# Patient Record
Sex: FEMALE | Race: WHITE | NOT HISPANIC OR LATINO | Employment: FULL TIME | URBAN - NONMETROPOLITAN AREA
[De-identification: names, ages, dates, MRNs, and addresses within clinical notes are randomized per-mention and may not be internally consistent; named-entity substitution may affect disease eponyms.]

---

## 2017-02-03 RX ORDER — NORGESTIMATE AND ETHINYL ESTRADIOL 0.25-0.035
KIT ORAL
Qty: 28 TABLET | Refills: 0 | Status: SHIPPED | OUTPATIENT
Start: 2017-02-03 | End: 2017-02-06 | Stop reason: SDUPTHER

## 2017-02-06 ENCOUNTER — PROCEDURE VISIT (OUTPATIENT)
Dept: OBSTETRICS AND GYNECOLOGY | Facility: CLINIC | Age: 27
End: 2017-02-06

## 2017-02-06 VITALS — BODY MASS INDEX: 21.62 KG/M2 | HEIGHT: 70 IN | WEIGHT: 151 LBS

## 2017-02-06 DIAGNOSIS — Z30.41 USES ORAL CONTRACEPTION: ICD-10-CM

## 2017-02-06 DIAGNOSIS — Z01.419 ENCOUNTER FOR ANNUAL ROUTINE GYNECOLOGICAL EXAMINATION: Primary | ICD-10-CM

## 2017-02-06 PROCEDURE — 99385 PREV VISIT NEW AGE 18-39: CPT | Performed by: ADVANCED PRACTICE MIDWIFE

## 2017-02-06 PROCEDURE — 88142 CYTOPATH C/V THIN LAYER: CPT | Performed by: ADVANCED PRACTICE MIDWIFE

## 2017-02-06 RX ORDER — NORGESTIMATE AND ETHINYL ESTRADIOL 0.25-0.035
1 KIT ORAL DAILY
Qty: 28 TABLET | Refills: 12 | Status: SHIPPED | OUTPATIENT
Start: 2017-02-06 | End: 2018-01-24 | Stop reason: HOSPADM

## 2017-02-06 NOTE — PROGRESS NOTES
Subjective   Angélica Hooper is a 26 y.o. female. Who presents for annual exam, refill on her OCPs, and preconceptional counseling    Gynecologic Exam   She is not pregnant. She uses oral contraceptives for contraception. Her menstrual history has been regular.       The following portions of the patient's history were reviewed and updated as appropriate: allergies, current medications, past family history, past medical history, past social history, past surgical history and problem list.    Review of Systems   Constitutional: Negative.    HENT: Negative.    Eyes: Negative.    Respiratory: Negative.    Cardiovascular: Negative.    Gastrointestinal: Negative.    Endocrine: Negative.    Genitourinary: Negative.    Musculoskeletal: Negative.    Skin: Negative.    Allergic/Immunologic: Negative.    Neurological: Negative.    Hematological: Negative.    Psychiatric/Behavioral: Negative.        Objective   Physical Exam   Constitutional: She is oriented to person, place, and time. She appears well-developed and well-nourished.   HENT:   Head: Normocephalic and atraumatic.   Right Ear: External ear normal.   Left Ear: External ear normal.   Nose: Nose normal.   Mouth/Throat: Oropharynx is clear and moist.   Eyes: Conjunctivae and EOM are normal. Pupils are equal, round, and reactive to light.   Neck: Normal range of motion. Neck supple. No thyromegaly present.   Cardiovascular: Normal rate, regular rhythm and normal heart sounds.    Pulmonary/Chest: Effort normal and breath sounds normal.   Abdominal: Soft. Bowel sounds are normal.   Genitourinary: Vagina normal and uterus normal.   Musculoskeletal: Normal range of motion.   Neurological: She is alert and oriented to person, place, and time. She has normal reflexes.   Skin: Skin is warm and dry.   Psychiatric: She has a normal mood and affect. Her behavior is normal. Thought content normal.       Assessment/Plan   Angélica was seen today for gynecologic exam. Reviewed  walking 3 times a week.  Reviewed the use of a multivitamin or prenatal vitamins daily.  Patient is planning to conceive discussed stopping oral contraception in June.  Reviewed having intercourse every other day during ovulation period.  Discussed nutrition and balanced diet.  Discussed aches ACHES signs and symptoms to report while taking oral contraception.  Patient verbalized understanding.     Diagnoses and all orders for this visit:    Encounter for annual routine gynecological examination  -     Liquid-based Pap Smear, Screening    Other orders  -     norgestimate-ethinyl estradiol (MONONESSA) 0.25-35 MG-MCG per tablet; Take 1 tablet by mouth Daily.

## 2017-02-08 LAB
LAB AP CASE REPORT: NORMAL
LAB AP GYN ADDITIONAL INFORMATION: NORMAL
LAB AP GYN OTHER FINDINGS: NORMAL
Lab: NORMAL
PATH INTERP SPEC-IMP: NORMAL
STAT OF ADQ CVX/VAG CYTO-IMP: NORMAL

## 2017-03-03 RX ORDER — NORGESTIMATE AND ETHINYL ESTRADIOL 0.25-0.035
KIT ORAL
Qty: 28 TABLET | Refills: 0 | OUTPATIENT
Start: 2017-03-03

## 2017-06-29 ENCOUNTER — HOSPITAL ENCOUNTER (EMERGENCY)
Facility: HOSPITAL | Age: 27
Discharge: HOME | End: 2017-06-29
Attending: FAMILY MEDICINE | Admitting: NURSE PRACTITIONER

## 2017-06-29 ENCOUNTER — APPOINTMENT (OUTPATIENT)
Dept: ULTRASOUND IMAGING | Facility: HOSPITAL | Age: 27
End: 2017-06-29

## 2017-06-29 VITALS
DIASTOLIC BLOOD PRESSURE: 91 MMHG | HEART RATE: 85 BPM | SYSTOLIC BLOOD PRESSURE: 130 MMHG | WEIGHT: 160 LBS | TEMPERATURE: 98 F | RESPIRATION RATE: 18 BRPM | BODY MASS INDEX: 23.7 KG/M2 | HEIGHT: 69 IN | OXYGEN SATURATION: 98 %

## 2017-06-29 DIAGNOSIS — N93.9 VAGINAL BLEEDING: Primary | ICD-10-CM

## 2017-06-29 LAB
ABO GROUP BLD: NORMAL
ALBUMIN SERPL-MCNC: 4.3 G/DL (ref 3.4–4.8)
ALBUMIN/GLOB SERPL: 1.1 G/DL (ref 1.1–1.8)
ALP SERPL-CCNC: 88 U/L (ref 38–126)
ALT SERPL W P-5'-P-CCNC: 29 U/L (ref 9–52)
ANION GAP SERPL CALCULATED.3IONS-SCNC: 14 MMOL/L (ref 5–15)
AST SERPL-CCNC: 20 U/L (ref 14–36)
BACTERIA UR QL AUTO: ABNORMAL /HPF
BASOPHILS # BLD AUTO: 0.01 10*3/MM3 (ref 0–0.2)
BASOPHILS NFR BLD AUTO: 0.1 % (ref 0–2)
BILIRUB SERPL-MCNC: 0.6 MG/DL (ref 0.2–1.3)
BILIRUB UR QL STRIP: NEGATIVE
BLD GP AB SCN SERPL QL: NEGATIVE
BUN BLD-MCNC: 12 MG/DL (ref 7–21)
BUN/CREAT SERPL: 16.2 (ref 7–25)
CALCIUM SPEC-SCNC: 9.2 MG/DL (ref 8.4–10.2)
CANDIDA ALBICANS: NEGATIVE
CHLORIDE SERPL-SCNC: 101 MMOL/L (ref 95–110)
CLARITY UR: ABNORMAL
CO2 SERPL-SCNC: 24 MMOL/L (ref 22–31)
COLOR UR: ABNORMAL
CREAT BLD-MCNC: 0.74 MG/DL (ref 0.5–1)
DEPRECATED RDW RBC AUTO: 41 FL (ref 36.4–46.3)
EOSINOPHIL # BLD AUTO: 0.03 10*3/MM3 (ref 0–0.7)
EOSINOPHIL NFR BLD AUTO: 0.4 % (ref 0–7)
ERYTHROCYTE [DISTWIDTH] IN BLOOD BY AUTOMATED COUNT: 13 % (ref 11.5–14.5)
GARDNERELLA VAGINALIS: NEGATIVE
GFR SERPL CREATININE-BSD FRML MDRD: 95 ML/MIN/1.73 (ref 71–165)
GLOBULIN UR ELPH-MCNC: 3.9 GM/DL (ref 2.3–3.5)
GLUCOSE BLD-MCNC: 90 MG/DL (ref 60–100)
GLUCOSE UR STRIP-MCNC: NEGATIVE MG/DL
HCG INTACT+B SERPL-ACNC: 162.28 MIU/ML
HCT VFR BLD AUTO: 37.3 % (ref 35–45)
HGB BLD-MCNC: 13 G/DL (ref 12–15.5)
HGB UR QL STRIP.AUTO: ABNORMAL
HYALINE CASTS UR QL AUTO: ABNORMAL /LPF
IMM GRANULOCYTES # BLD: 0.02 10*3/MM3 (ref 0–0.02)
IMM GRANULOCYTES NFR BLD: 0.3 % (ref 0–0.5)
KETONES UR QL STRIP: NEGATIVE
LEUKOCYTE ESTERASE UR QL STRIP.AUTO: ABNORMAL
LYMPHOCYTES # BLD AUTO: 2.17 10*3/MM3 (ref 0.6–4.2)
LYMPHOCYTES NFR BLD AUTO: 31.8 % (ref 10–50)
Lab: NORMAL
MCH RBC QN AUTO: 29.9 PG (ref 26.5–34)
MCHC RBC AUTO-ENTMCNC: 34.9 G/DL (ref 31.4–36)
MCV RBC AUTO: 85.7 FL (ref 80–98)
MONOCYTES # BLD AUTO: 0.73 10*3/MM3 (ref 0–0.9)
MONOCYTES NFR BLD AUTO: 10.7 % (ref 0–12)
NEUTROPHILS # BLD AUTO: 3.86 10*3/MM3 (ref 2–8.6)
NEUTROPHILS NFR BLD AUTO: 56.7 % (ref 37–80)
NITRITE UR QL STRIP: NEGATIVE
PH UR STRIP.AUTO: 7 [PH] (ref 5–9)
PLATELET # BLD AUTO: 248 10*3/MM3 (ref 150–450)
PMV BLD AUTO: 9.7 FL (ref 8–12)
POTASSIUM BLD-SCNC: 3.6 MMOL/L (ref 3.5–5.1)
PROT SERPL-MCNC: 8.2 G/DL (ref 6.3–8.6)
PROT UR QL STRIP: ABNORMAL
RBC # BLD AUTO: 4.35 10*6/MM3 (ref 3.77–5.16)
RBC # UR: ABNORMAL /HPF
REF LAB TEST METHOD: ABNORMAL
RH BLD: POSITIVE
SODIUM BLD-SCNC: 139 MMOL/L (ref 137–145)
SP GR UR STRIP: 1.01 (ref 1–1.03)
SQUAMOUS #/AREA URNS HPF: ABNORMAL /HPF
TRICHOMONAS VAGINALIS PCR: NEGATIVE
UROBILINOGEN UR QL STRIP: ABNORMAL
WBC NRBC COR # BLD: 6.82 10*3/MM3 (ref 3.2–9.8)
WBC UR QL AUTO: ABNORMAL /HPF

## 2017-06-29 PROCEDURE — 86900 BLOOD TYPING SEROLOGIC ABO: CPT | Performed by: NURSE PRACTITIONER

## 2017-06-29 PROCEDURE — 87660 TRICHOMONAS VAGIN DIR PROBE: CPT | Performed by: NURSE PRACTITIONER

## 2017-06-29 PROCEDURE — 80053 COMPREHEN METABOLIC PANEL: CPT | Performed by: NURSE PRACTITIONER

## 2017-06-29 PROCEDURE — 87510 GARDNER VAG DNA DIR PROBE: CPT | Performed by: NURSE PRACTITIONER

## 2017-06-29 PROCEDURE — 99283 EMERGENCY DEPT VISIT LOW MDM: CPT

## 2017-06-29 PROCEDURE — 76817 TRANSVAGINAL US OBSTETRIC: CPT

## 2017-06-29 PROCEDURE — 86901 BLOOD TYPING SEROLOGIC RH(D): CPT | Performed by: NURSE PRACTITIONER

## 2017-06-29 PROCEDURE — 85025 COMPLETE CBC W/AUTO DIFF WBC: CPT | Performed by: NURSE PRACTITIONER

## 2017-06-29 PROCEDURE — 87591 N.GONORRHOEAE DNA AMP PROB: CPT | Performed by: NURSE PRACTITIONER

## 2017-06-29 PROCEDURE — 87491 CHLMYD TRACH DNA AMP PROBE: CPT | Performed by: NURSE PRACTITIONER

## 2017-06-29 PROCEDURE — 81001 URINALYSIS AUTO W/SCOPE: CPT | Performed by: FAMILY MEDICINE

## 2017-06-29 PROCEDURE — 86850 RBC ANTIBODY SCREEN: CPT | Performed by: NURSE PRACTITIONER

## 2017-06-29 PROCEDURE — 87480 CANDIDA DNA DIR PROBE: CPT | Performed by: NURSE PRACTITIONER

## 2017-06-29 PROCEDURE — 84702 CHORIONIC GONADOTROPIN TEST: CPT | Performed by: NURSE PRACTITIONER

## 2017-06-29 PROCEDURE — 87086 URINE CULTURE/COLONY COUNT: CPT | Performed by: FAMILY MEDICINE

## 2017-06-29 RX ORDER — SODIUM CHLORIDE 0.9 % (FLUSH) 0.9 %
10 SYRINGE (ML) INJECTION AS NEEDED
Status: DISCONTINUED | OUTPATIENT
Start: 2017-06-29 | End: 2017-06-29 | Stop reason: HOSPADM

## 2017-06-29 RX ORDER — PRENATAL VIT NO.126/IRON/FOLIC 28MG-0.8MG
TABLET ORAL DAILY
COMMUNITY
End: 2022-06-28

## 2017-06-30 ENCOUNTER — APPOINTMENT (OUTPATIENT)
Dept: LAB | Facility: HOSPITAL | Age: 27
End: 2017-06-30

## 2017-06-30 DIAGNOSIS — O20.0 THREATENED MISCARRIAGE IN EARLY PREGNANCY: Primary | ICD-10-CM

## 2017-06-30 DIAGNOSIS — O20.9 VAGINAL BLEEDING IN PREGNANCY, FIRST TRIMESTER: Primary | ICD-10-CM

## 2017-06-30 LAB
BACTERIA SPEC AEROBE CULT: NORMAL
C TRACH RRNA CVX QL NAA+PROBE: NOT DETECTED
HCG INTACT+B SERPL-ACNC: 159.77 MIU/ML
N GONORRHOEA RRNA SPEC QL NAA+PROBE: NOT DETECTED

## 2017-06-30 PROCEDURE — 36415 COLL VENOUS BLD VENIPUNCTURE: CPT | Performed by: ADVANCED PRACTICE MIDWIFE

## 2017-06-30 PROCEDURE — 84702 CHORIONIC GONADOTROPIN TEST: CPT | Performed by: ADVANCED PRACTICE MIDWIFE

## 2017-07-02 DIAGNOSIS — O20.0 MISCARRIAGE, THREATENED, EARLY PREGNANCY: Primary | ICD-10-CM

## 2017-07-05 ENCOUNTER — LAB (OUTPATIENT)
Dept: LAB | Facility: HOSPITAL | Age: 27
End: 2017-07-05

## 2017-07-05 DIAGNOSIS — O20.0 MISCARRIAGE, THREATENED, EARLY PREGNANCY: ICD-10-CM

## 2017-07-05 DIAGNOSIS — O03.9 MISCARRIAGE: Primary | ICD-10-CM

## 2017-07-05 LAB — HCG INTACT+B SERPL-ACNC: 5.22 MIU/ML

## 2017-07-05 PROCEDURE — 84702 CHORIONIC GONADOTROPIN TEST: CPT | Performed by: ADVANCED PRACTICE MIDWIFE

## 2017-07-05 PROCEDURE — 36415 COLL VENOUS BLD VENIPUNCTURE: CPT

## 2017-07-10 ENCOUNTER — LAB (OUTPATIENT)
Dept: LAB | Facility: HOSPITAL | Age: 27
End: 2017-07-10

## 2017-07-10 DIAGNOSIS — O03.9 MISCARRIAGE: ICD-10-CM

## 2017-07-10 LAB — HCG INTACT+B SERPL-ACNC: <2.4 MIU/ML

## 2017-07-10 PROCEDURE — 36415 COLL VENOUS BLD VENIPUNCTURE: CPT

## 2017-07-10 PROCEDURE — 84702 CHORIONIC GONADOTROPIN TEST: CPT | Performed by: ADVANCED PRACTICE MIDWIFE

## 2018-01-16 RX ORDER — ONDANSETRON 4 MG/1
4 TABLET, FILM COATED ORAL EVERY 8 HOURS PRN
Qty: 20 TABLET | Refills: 5 | Status: SHIPPED | OUTPATIENT
Start: 2018-01-16 | End: 2018-01-24 | Stop reason: HOSPADM

## 2018-01-24 ENCOUNTER — APPOINTMENT (OUTPATIENT)
Dept: LAB | Facility: HOSPITAL | Age: 28
End: 2018-01-24

## 2018-01-24 ENCOUNTER — INITIAL PRENATAL (OUTPATIENT)
Dept: OBSTETRICS AND GYNECOLOGY | Facility: CLINIC | Age: 28
End: 2018-01-24

## 2018-01-24 VITALS — WEIGHT: 169 LBS | BODY MASS INDEX: 24.96 KG/M2 | SYSTOLIC BLOOD PRESSURE: 116 MMHG | DIASTOLIC BLOOD PRESSURE: 81 MMHG

## 2018-01-24 DIAGNOSIS — Z34.02 ENCOUNTER FOR PRENATAL CARE IN SECOND TRIMESTER OF FIRST PREGNANCY: ICD-10-CM

## 2018-01-24 DIAGNOSIS — Z32.01 PREGNANCY EXAMINATION OR TEST, POSITIVE RESULT: ICD-10-CM

## 2018-01-24 DIAGNOSIS — Z32.00 PREGNANCY EXAMINATION OR TEST, PREGNANCY UNCONFIRMED: ICD-10-CM

## 2018-01-24 DIAGNOSIS — Z34.81 PRENATAL CARE, SUBSEQUENT PREGNANCY IN FIRST TRIMESTER: Primary | ICD-10-CM

## 2018-01-24 DIAGNOSIS — Z3A.00 WEEKS OF GESTATION OF PREGNANCY NOT SPECIFIED: ICD-10-CM

## 2018-01-24 LAB
ABO GROUP BLD: NORMAL
AMPHET+METHAMPHET UR QL: NEGATIVE
B-HCG UR QL: POSITIVE
BARBITURATES UR QL SCN: NEGATIVE
BASOPHILS # BLD AUTO: 0.01 10*3/MM3 (ref 0–0.2)
BASOPHILS NFR BLD AUTO: 0.1 % (ref 0–2)
BENZODIAZ UR QL SCN: NEGATIVE
BILIRUB UR QL STRIP: NEGATIVE
BLD GP AB SCN SERPL QL: NEGATIVE
CANNABINOIDS SERPL QL: NEGATIVE
CLARITY UR: CLEAR
COCAINE UR QL: NEGATIVE
COLOR UR: YELLOW
DEPRECATED RDW RBC AUTO: 40.2 FL (ref 36.4–46.3)
EOSINOPHIL # BLD AUTO: 0.05 10*3/MM3 (ref 0–0.7)
EOSINOPHIL NFR BLD AUTO: 0.5 % (ref 0–7)
ERYTHROCYTE [DISTWIDTH] IN BLOOD BY AUTOMATED COUNT: 12.8 % (ref 11.5–14.5)
GLUCOSE UR STRIP-MCNC: NEGATIVE MG/DL
HBV SURFACE AG SERPL QL IA: NEGATIVE
HCG INTACT+B SERPL-ACNC: ABNORMAL MIU/ML
HCT VFR BLD AUTO: 37.7 % (ref 35–45)
HCV AB SER DONR QL: NEGATIVE
HGB BLD-MCNC: 13.1 G/DL (ref 12–15.5)
HGB UR QL STRIP.AUTO: NEGATIVE
HIV1+2 AB SER QL: NEGATIVE
IMM GRANULOCYTES # BLD: 0.04 10*3/MM3 (ref 0–0.02)
IMM GRANULOCYTES NFR BLD: 0.4 % (ref 0–0.5)
INTERNAL NEGATIVE CONTROL: POSITIVE
INTERNAL POSITIVE CONTROL: POSITIVE
KETONES UR QL STRIP: NEGATIVE
LEUKOCYTE ESTERASE UR QL STRIP.AUTO: ABNORMAL
LYMPHOCYTES # BLD AUTO: 3.07 10*3/MM3 (ref 0.6–4.2)
LYMPHOCYTES NFR BLD AUTO: 33.5 % (ref 10–50)
Lab: ABNORMAL
Lab: NORMAL
MCH RBC QN AUTO: 29.9 PG (ref 26.5–34)
MCHC RBC AUTO-ENTMCNC: 34.7 G/DL (ref 31.4–36)
MCV RBC AUTO: 86.1 FL (ref 80–98)
METHADONE UR QL SCN: NEGATIVE
MONOCYTES # BLD AUTO: 1 10*3/MM3 (ref 0–0.9)
MONOCYTES NFR BLD AUTO: 10.9 % (ref 0–12)
NEUTROPHILS # BLD AUTO: 4.99 10*3/MM3 (ref 2–8.6)
NEUTROPHILS NFR BLD AUTO: 54.6 % (ref 37–80)
NITRITE UR QL STRIP: NEGATIVE
OPIATES UR QL: NEGATIVE
OXYCODONE UR QL SCN: NEGATIVE
PH UR STRIP.AUTO: 7.5 [PH] (ref 5–9)
PLATELET # BLD AUTO: 290 10*3/MM3 (ref 150–450)
PMV BLD AUTO: 9.6 FL (ref 8–12)
PROT UR QL STRIP: NEGATIVE
RBC # BLD AUTO: 4.38 10*6/MM3 (ref 3.77–5.16)
RH BLD: POSITIVE
RUBV IGG SER QL: ABNORMAL
RUBV IGG SER-ACNC: 28.1 IU/ML (ref 0–9.9)
SP GR UR STRIP: 1.01 (ref 1–1.03)
UROBILINOGEN UR QL STRIP: ABNORMAL
WBC NRBC COR # BLD: 9.16 10*3/MM3 (ref 3.2–9.8)

## 2018-01-24 PROCEDURE — 36415 COLL VENOUS BLD VENIPUNCTURE: CPT | Performed by: ADVANCED PRACTICE MIDWIFE

## 2018-01-24 PROCEDURE — 87491 CHLMYD TRACH DNA AMP PROBE: CPT | Performed by: ADVANCED PRACTICE MIDWIFE

## 2018-01-24 PROCEDURE — 87591 N.GONORRHOEAE DNA AMP PROB: CPT | Performed by: ADVANCED PRACTICE MIDWIFE

## 2018-01-24 PROCEDURE — 84702 CHORIONIC GONADOTROPIN TEST: CPT | Performed by: ADVANCED PRACTICE MIDWIFE

## 2018-01-24 PROCEDURE — 80307 DRUG TEST PRSMV CHEM ANLYZR: CPT | Performed by: ADVANCED PRACTICE MIDWIFE

## 2018-01-24 PROCEDURE — 86900 BLOOD TYPING SEROLOGIC ABO: CPT | Performed by: ADVANCED PRACTICE MIDWIFE

## 2018-01-24 PROCEDURE — 86901 BLOOD TYPING SEROLOGIC RH(D): CPT | Performed by: ADVANCED PRACTICE MIDWIFE

## 2018-01-24 PROCEDURE — G0432 EIA HIV-1/HIV-2 SCREEN: HCPCS | Performed by: ADVANCED PRACTICE MIDWIFE

## 2018-01-24 PROCEDURE — 87086 URINE CULTURE/COLONY COUNT: CPT | Performed by: ADVANCED PRACTICE MIDWIFE

## 2018-01-24 PROCEDURE — 86850 RBC ANTIBODY SCREEN: CPT | Performed by: ADVANCED PRACTICE MIDWIFE

## 2018-01-24 PROCEDURE — 87340 HEPATITIS B SURFACE AG IA: CPT | Performed by: ADVANCED PRACTICE MIDWIFE

## 2018-01-24 PROCEDURE — 81025 URINE PREGNANCY TEST: CPT | Performed by: ADVANCED PRACTICE MIDWIFE

## 2018-01-24 PROCEDURE — 81003 URINALYSIS AUTO W/O SCOPE: CPT | Performed by: ADVANCED PRACTICE MIDWIFE

## 2018-01-24 PROCEDURE — 85025 COMPLETE CBC W/AUTO DIFF WBC: CPT | Performed by: ADVANCED PRACTICE MIDWIFE

## 2018-01-24 PROCEDURE — 87661 TRICHOMONAS VAGINALIS AMPLIF: CPT | Performed by: ADVANCED PRACTICE MIDWIFE

## 2018-01-24 PROCEDURE — 0501F PRENATAL FLOW SHEET: CPT | Performed by: ADVANCED PRACTICE MIDWIFE

## 2018-01-24 PROCEDURE — 86762 RUBELLA ANTIBODY: CPT | Performed by: ADVANCED PRACTICE MIDWIFE

## 2018-01-24 PROCEDURE — 86803 HEPATITIS C AB TEST: CPT | Performed by: ADVANCED PRACTICE MIDWIFE

## 2018-01-24 NOTE — PROGRESS NOTES
CC: ROXANE visit, history reviewed see history tabs.     ROS:Positive occasional mild nausea    Negative leaking fluid from the vagina, swelling in her legs, headache, visual changes and abdominal pain  Objective: New OB physical done, see prenatal physical    Educated on:Spent 30 minutes out of 45 minutes face to face counseling on nutrition, activity, diet, safety, prenatal care, medications approved in pregnancy, pregnancy discomforts, and testing.     A/Plan: f/u in 1 week/s for dating US & review of labs  Angélica was seen today for initial prenatal visit.    Diagnoses and all orders for this visit:    Prenatal care, subsequent pregnancy in first trimester  -     Chlamydia trachomatis, Neisseria gonorrhoeae, Trichomonas vaginalis, PCR - Urine, Vagina  -     Hepatitis C Antibody  -     Urinalysis - Urine, Clean Catch  -     Urine Culture - Urine, Urine, Clean Catch  -     Urine Drug Screen - Urine, Clean Catch  -     OB Panel With HIV  -     hCG, Quantitative, Pregnancy  -     RPR  -     CBC Auto Differential  -     Hepatitis B Surface Antigen  -     Rubella Antibody, IgG  -     OB Panel Type & Screen  -     HIV-1 & HIV-2 Antibodies  -     PREVIOUS HISTORY; Future  -     PREVIOUS HISTORY    Pregnancy examination or test, pregnancy unconfirmed  -     POC Pregnancy, Urine  -     hCG, Quantitative, Pregnancy    Pregnancy examination or test, positive result  -     US Ob Transvaginal; Future  -     hCG, Quantitative, Pregnancy    Encounter for prenatal care in second trimester of first pregnancy

## 2018-01-25 LAB
BACTERIA SPEC AEROBE CULT: NORMAL
BACTERIA SPEC AEROBE CULT: NORMAL
C TRACH RRNA CVX QL NAA+PROBE: NEGATIVE
N GONORRHOEA RRNA SPEC QL NAA+PROBE: NEGATIVE
T VAGINALIS DNA VAG QL PROBE+SIG AMP: NEGATIVE

## 2018-01-26 ENCOUNTER — ROUTINE PRENATAL (OUTPATIENT)
Dept: OBSTETRICS AND GYNECOLOGY | Facility: CLINIC | Age: 28
End: 2018-01-26

## 2018-01-26 VITALS — SYSTOLIC BLOOD PRESSURE: 119 MMHG | DIASTOLIC BLOOD PRESSURE: 71 MMHG | WEIGHT: 169 LBS | BODY MASS INDEX: 24.96 KG/M2

## 2018-01-26 DIAGNOSIS — Z3A.00 WEEKS OF GESTATION OF PREGNANCY NOT SPECIFIED: ICD-10-CM

## 2018-01-26 DIAGNOSIS — O09.521 ENCOUNTER FOR SUPERVISION OF ELDERLY MULTIGRAVIDA IN FIRST TRIMESTER, ANTEPARTUM: Primary | ICD-10-CM

## 2018-01-26 LAB — RPR SER QL: NORMAL

## 2018-01-26 PROCEDURE — 0502F SUBSEQUENT PRENATAL CARE: CPT | Performed by: ADVANCED PRACTICE MIDWIFE

## 2018-01-26 NOTE — PROGRESS NOTES
CC: ROXANE visit, history reviewed see history tabs.     ROS:Positive Denies   Negative leaking fluid from the vagina, swelling in her legs, headache and visual changes      Educated on: US results, labs reviewed    A/Plan: f/u in 4 week/s   Angélica was seen today for routine prenatal visit.    Diagnoses and all orders for this visit:    Encounter for supervision of elderly multigravida in first trimester, antepartum    Weeks of gestation of pregnancy not specified

## 2018-02-26 ENCOUNTER — ROUTINE PRENATAL (OUTPATIENT)
Dept: OBSTETRICS AND GYNECOLOGY | Facility: CLINIC | Age: 28
End: 2018-02-26

## 2018-02-26 VITALS — DIASTOLIC BLOOD PRESSURE: 79 MMHG | SYSTOLIC BLOOD PRESSURE: 123 MMHG | BODY MASS INDEX: 25.25 KG/M2 | WEIGHT: 171 LBS

## 2018-02-26 DIAGNOSIS — Z36.89 ENCOUNTER FOR FETAL ANATOMIC SURVEY: ICD-10-CM

## 2018-02-26 DIAGNOSIS — Z34.81 PRENATAL CARE, SUBSEQUENT PREGNANCY, FIRST TRIMESTER: Primary | ICD-10-CM

## 2018-02-26 DIAGNOSIS — Z3A.12 12 WEEKS GESTATION OF PREGNANCY: ICD-10-CM

## 2018-02-26 PROCEDURE — 0502F SUBSEQUENT PRENATAL CARE: CPT | Performed by: ADVANCED PRACTICE MIDWIFE

## 2018-02-26 NOTE — PROGRESS NOTES
CC: ROXANE visit, history reviewed see history tabs.     ROS: Negative leaking fluid from the vagina, swelling in her legs, headache, visual changes, low back pain and heartburn      Educated on:Reviewed POC    A/Plan: f/u in 4 week/s   Angélica was seen today for routine prenatal visit.    Diagnoses and all orders for this visit:    Prenatal care, subsequent pregnancy, first trimester  -     US Ob 14 + Weeks Single or First Gestation; Future    12 weeks gestation of pregnancy    Encounter for fetal anatomic survey  -     US Ob 14 + Weeks Single or First Gestation; Future

## 2018-03-26 ENCOUNTER — ROUTINE PRENATAL (OUTPATIENT)
Dept: OBSTETRICS AND GYNECOLOGY | Facility: CLINIC | Age: 28
End: 2018-03-26

## 2018-03-26 VITALS — DIASTOLIC BLOOD PRESSURE: 81 MMHG | BODY MASS INDEX: 25.99 KG/M2 | WEIGHT: 176 LBS | SYSTOLIC BLOOD PRESSURE: 123 MMHG

## 2018-03-26 DIAGNOSIS — Z3A.16 16 WEEKS GESTATION OF PREGNANCY: ICD-10-CM

## 2018-03-26 DIAGNOSIS — Z34.80 ENCOUNTER FOR SUPERVISION OF NORMAL PREGNANCY IN MULTIGRAVIDA: Primary | ICD-10-CM

## 2018-03-26 PROCEDURE — 0502F SUBSEQUENT PRENATAL CARE: CPT | Performed by: ADVANCED PRACTICE MIDWIFE

## 2018-03-26 NOTE — PROGRESS NOTES
CC: ROXANE visit, history reviewed     ROS:Positive No compalints   Negative: LOF, Abd pain      Educated on:Upcoming anatomy scan    A/Plan: f/u in 2 week/s   Angélica was seen today for routine prenatal visit.    Diagnoses and all orders for this visit:    Encounter for supervision of normal pregnancy in multigravida    16 weeks gestation of pregnancy

## 2018-04-11 ENCOUNTER — ROUTINE PRENATAL (OUTPATIENT)
Dept: OBSTETRICS AND GYNECOLOGY | Facility: CLINIC | Age: 28
End: 2018-04-11

## 2018-04-11 VITALS — DIASTOLIC BLOOD PRESSURE: 87 MMHG | WEIGHT: 181 LBS | BODY MASS INDEX: 26.73 KG/M2 | SYSTOLIC BLOOD PRESSURE: 135 MMHG

## 2018-04-11 DIAGNOSIS — Z34.80 ENCOUNTER FOR SUPERVISION OF NORMAL PREGNANCY IN MULTIGRAVIDA: ICD-10-CM

## 2018-04-11 DIAGNOSIS — Z3A.18 18 WEEKS GESTATION OF PREGNANCY: Primary | ICD-10-CM

## 2018-04-11 PROCEDURE — 0502F SUBSEQUENT PRENATAL CARE: CPT | Performed by: ADVANCED PRACTICE MIDWIFE

## 2018-05-09 ENCOUNTER — ROUTINE PRENATAL (OUTPATIENT)
Dept: OBSTETRICS AND GYNECOLOGY | Facility: CLINIC | Age: 28
End: 2018-05-09

## 2018-05-09 VITALS — SYSTOLIC BLOOD PRESSURE: 121 MMHG | WEIGHT: 188 LBS | DIASTOLIC BLOOD PRESSURE: 80 MMHG | BODY MASS INDEX: 27.76 KG/M2

## 2018-05-09 DIAGNOSIS — Z3A.22 22 WEEKS GESTATION OF PREGNANCY: Primary | ICD-10-CM

## 2018-05-09 DIAGNOSIS — M53.3 TAIL BONE PAIN: ICD-10-CM

## 2018-05-09 PROCEDURE — 0502F SUBSEQUENT PRENATAL CARE: CPT | Performed by: ADVANCED PRACTICE MIDWIFE

## 2018-05-10 ENCOUNTER — HOSPITAL ENCOUNTER (OUTPATIENT)
Dept: PHYSICAL THERAPY | Facility: HOSPITAL | Age: 28
Discharge: HOME | End: 2018-05-10
Admitting: ADVANCED PRACTICE MIDWIFE

## 2018-05-10 DIAGNOSIS — M54.9 BACK PAIN AFFECTING PREGNANCY IN SECOND TRIMESTER: Primary | ICD-10-CM

## 2018-05-10 DIAGNOSIS — O99.891 BACK PAIN AFFECTING PREGNANCY IN SECOND TRIMESTER: Primary | ICD-10-CM

## 2018-05-10 DIAGNOSIS — M53.3 COCCYX PAIN: ICD-10-CM

## 2018-05-10 PROCEDURE — 97162 PT EVAL MOD COMPLEX 30 MIN: CPT | Performed by: PHYSICAL THERAPIST

## 2018-05-10 PROCEDURE — 97140 MANUAL THERAPY 1/> REGIONS: CPT | Performed by: PHYSICAL THERAPIST

## 2018-05-10 NOTE — THERAPY EVALUATION
Outpatient Physical Therapy Women's Health Initial Evaluation  [facility]     Patient Name: Angélica Hooper  : 1990  MRN: [id number]  Today's Date: 5/10/2018        Visit Date: 05/10/2018  Visit Number:   Recheck: 18  RTD: 18    There is no problem list on file for this patient.       Past Medical History:   Diagnosis Date   • Clavicle fracture    • Coccyx pain    • Encounter for routine postpartum follow-up    • Irregular heartbeat    • Penicillin allergy         Past Surgical History:   Procedure Laterality Date   • ADENOIDECTOMY     • TONSILLECTOMY           Visit Dx:    ICD-10-CM ICD-9-CM   1. Back pain affecting pregnancy in second trimester O26.892 646.83    M54.9 724.5   2. Coccyx pain M53.3 724.79                 Women's Health     Row Name 05/10/18 1600             Pregnancy Questions    Number of Pregnancies 3  -SW      Number of Children 1   1 yo at home   -SW      How many weeks pregnant are you? 23 weeks  -SW      Type of Previous Deliveries Vaginal  -SW      Due Date 18  -SW        User Key  (r) = Recorded By, (t) = Taken By, (c) = Cosigned By    Initials Name Provider Type    SW Pat Perez, PT Physical Therapist              PT Ortho     Row Name 05/10/18 1600       Subjective Comments    Subjective Comments LBP with past pregnancy but decreased post delivery.  Back again with new pregnancy.  Tailbone hurts consistently.  Sometimes difficulty with getting out of bed.  Used chiropractor in past but none currently.  Noted tailbone pain at 18 weeks, but back around 10-12 weeks gestation.  Currently 23 weeks gestation.  Felt tingle bilaterally but denies radicular pains into leg.  Positions self more ant weight shifted to provide dec pain.  No pain in tailbone region with bowel movements.  [place] discomfort noted some around tailbone and along back.  No history of fracture to tailbone.  Did have an injury where she slid on floor at [employer] and landed  flat of back/butt.  But denies any s/s until this pregnancy.   -SW       Subjective Pain    Able to rate subjective pain? yes  -SW    Pre-Treatment Pain Level 3  -SW    Post-Treatment Pain Level 4  -SW       Posture/Observations    Posture- WNL Posture is WNL  -SW    Posture/Observations Comments Gait is without AD.  Normal step and stride noted.  Cautious with transfers up and down out of seat.    -SW       Quarter Clearing    Quarter Clearing Lower Quarter Clearing  -SW       DTR- Lower Quarter Clearing    Patellar tendon (L2-4) 2- Normal response  -SW    Achilles tendon (S1-2) 2- Normal response  -SW       Neural Tension Signs- Lower Quarter Clearing    Slump Negative  -SW    SLR Negative  -SW       Sensory Screen for Light Touch- Lower Quarter Clearing    L1 (inguinal area) Intact  -SW    L2 (anterior mid thigh) Intact  -SW    L3 (distal anterior thigh) Intact  -SW    L4 (medial lower leg/foot) Intact  -SW    L5 (lateral lower leg/great toe) Intact  -SW    S1 (bottom of foot) Intact  -SW       Myotomal Screen- Lower Quarter Clearing    Hip flexion (L2) WNL  -SW    Knee extension (L3) WNL  -SW    Ankle DF (L4) WNL  -SW    Great toe extension (L5) WNL  -SW    Ankle PF (S1) WNL  -SW    Knee flexion (S2) WNL  -SW       Lumbar ROM Screen- Lower Quarter Clearing    Lumbar Flexion Normal   pain at end range  -SW    Lumbar Extension Normal   pain at end range  -SW    Lumbar Lateral Flexion Normal  -SW    Lumbar Rotation Normal  -SW       SI/Hip Screen- Lower Quarter Clearing    ASIS compression Negative   improved s/s with compression   -SW    ASIS distraction Positive  -SW    Mary's/Oswaldo's test Positive  -SW    Posterior thigh sheer Negative  -SW       Special Tests/Palpation    Special Tests/Palpation Lumbar/SI  -SW       Lumbosacral Palpation    SI --   prominent on L base  -SW    Lumbosacral Segment Left:;Guarded/taut   L3-5 rotated to L   -SW    Quadratus Lumborum Left:;Guarded/taut  -SW    Erector Spinae  (Paraspinals) Left:;Tender;Guarded/taut  -SW    Lumbosacral Palpation? Yes  -SW       Lumbosacral Accessory Motions    Lumbosacral Accessory Motions Tested? Yes  -SW    PA Glide- L1 WNL  -SW    PA Linch- L2 WNL  -SW    PA Linch- L3 --   rotated to L, prominent L3-5  -SW    PA glide- Sacral base --   prominent L base   -SW    Innominate rotation --   slight ant innom on L side   -SW       Lumbar/SI Special Tests    Standing Flexion Test (SI Dysfunction) Positive  -SW    Stork Test (SI Dysfunction) Positive  -SW    Slump Test (Neural Tension) Negative  -SW    SLR (Neural Tension) Negative  -SW    SI Compression Test (SI Dysfunction) Negative   improved s/s  -SW    SI Distraction Test (SI Dysfunction) Positive  -SW    Thigh Thrust/Posterior Shear (SI Dysfunction) Negative  -SW    BENNY (hip vs. SI Dysfunction) Negative  -SW       Special Lumbosacral Questions    Are you pregnant? Yes  -SW       Transfers    Comment (Transfers) Slow, guarded and cautious  -SW      User Key  (r) = Recorded By, (t) = Taken By, (c) = Cosigned By    Initials Name Provider Type    SW Pat Perez, PT Physical Therapist                           PT Assessment/Plan     Row Name 05/10/18 1700          PT Assessment    Functional Limitations Performance in leisure activities;Performance in self-care ADL;Performance in work activities;Other (comment)   caregiving  -SW     Impairments Impaired postural alignment;Joint mobility;Muscle strength;Pain;Poor body mechanics;Posture  -SW     Assessment Comments Patient is a 26 yo female with complaints of LBP in pregnancy.  She is currently  and 23 weeks gestation. She presents with lumbosacral torsion L on R with residual muscular spasm on L side erector spinae muscles.  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  Patient would benefit from skilled PT intervention  to address deficits as stated throughout evaluation and improve ability to function at work and at home.   -SW     Rehab Potential Good  -SW     Patient/caregiver participated in establishment of treatment plan and goals Yes  -SW     Patient would benefit from skilled therapy intervention Yes  -SW        PT Plan    PT Frequency 1x/week  -SW     Predicted Duration of Therapy Intervention (OT Eval) 8-12 visits  -SW     PT Plan Comments Manual therapy for alignment, MET for positioning.  Body mechanics education to improve function at work and home with less pain.  Stretch and strengthen core for protection of mechanical lifting peformance.    -SW       User Key  (r) = Recorded By, (t) = Taken By, (c) = Cosigned By    Initials Name Provider Type    SW Pat Anderbarb Perez, PT Physical Therapist                  Exercises     Row Name 05/10/18 1600             Subjective Comments    Subjective Comments LBP with past pregnancy but decreased post delivery.  Back again with new pregnancy.  Tailbone hurts consistently.  Sometimes difficulty with getting out of bed.  Used chiropractor in past but none currently.  Noted tailbone pain at 18 weeks, but back around 10-12 weeks gestation.  Currently 23 weeks gestation.  Felt tingle bilaterally but denies radicular pains into leg.  Positions self more ant weight shifted to provide dec pain.  No pain in tailbone region with bowel movements.  [place] discomfort noted some around tailbone and along back.  No history of fracture to tailbone.  Did have an injury where she slid on floor at [employer] and landed flat of back/butt.  But denies any s/s until this pregnancy.   -SW         Subjective Pain    Able to rate subjective pain? yes  -SW      Pre-Treatment Pain Level 3  -SW      Post-Treatment Pain Level 4  -SW         Exercise 1    Exercise Name 1 angry cat stretch   -SW      Reps 1 10  -SW      Time 1 5  -SW         Exercise 2    Exercise Name 2 piriformis stretch   -SW       Reps 2 3  -SW      Time 2 30  -SW         Exercise 3    Exercise Name 3 isometric TA with transitional movements  -SW      Additional Comments Encouraged activation with transitional movements to better support uterus and pelvic girdle.   -SW         Exercise 4    Exercise Name 4 Discussed BM for work and home related to caregiving and lifting  -SW         Exercise 5    Exercise Name 5 Educated on towel roll for sleep position to support abdomen.   -SW        User Key  (r) = Recorded By, (t) = Taken By, (c) = Cosigned By    Initials Name Provider Type    KARIE Perez, PT Physical Therapist                            PT OP Goals     Row Name 05/10/18 1700          PT Short Term Goals    STG Date to Achieve 06/08/18  -     STG 1 independent HEP for stretching and stability   -SW     STG 1 Progress New  -     STG 2 alignment level bilaterally without need for MET to correct  -     STG 2 Progress New  -     STG 3 Pain reduced such that she is able to mobilize in/out of bed without assist  -     STG 3 Progress New  -     STG 4 Pain reduced to mild vs mod/severe  -     STG 4 Progress New  -     STG 5 Decrease spasm to L side paraspinals in lumbar region to allow palpation without withdrawl.   -     STG 5 Progress New  -        Long Term Goals    LTG Date to Achieve 08/10/18  -     LTG 1 Subjectively improve 75% overall   -     LTG 1 Progress New  -     LTG 2 Patient to be able to complete  activities without increased pain or restrictions limiting function   -     LTG 2 Progress New  -     LTG 3 Patient to be able to position for rest to allow at least 5-6 hours of consistent sleep without awakening due to pain   -     LTG 3 Progress New  [facility]        Time Calculation    PT Goal Re-Cert Due Date 06/08/18  -       User Key  (r) = Recorded By, (t) = Taken By, (c) = Cosigned By    Initials Name Provider Type    KARIE Perez, PT Physical Therapist          Therapy  Education  Given: HEP, Symptoms/condition management, Posture/body mechanics  Program: New  How Provided: Verbal, Demonstration  Provided to: Patient  Level of Understanding: Teach back education performed, Verbalized, Demonstrated               Time Calculation:   Start Time: 1612  Stop Time: 1720  Time Calculation (min): 68 min    Therapy Charges for Today     Code Description Service Date Service Provider Modifiers Qty    08175348270 HC PT MANUAL THERAPY EA 15 MIN 5/10/2018 Pat Perez, PT GP 1    15478322766 HC PT EVAL MOD COMPLEXITY 4 5/10/2018 Pat Perez, PT GP 1    20560124650 HC PT THER SUPP EA 15 MIN 5/10/2018 Pat Perez, PT GP 1                  Pat Perez, PT  5/10/2018

## 2018-05-11 NOTE — PROGRESS NOTES
CC: ROXANE visit, history reviewed, changes noted    ROS:Positive tail bone pain for last couple of weeks   Negative leaking fluid from the vagina, swelling in her legs, headache, visual changes, low back pain and heartburn      Educated on:referral for tail bone pain    A/Plan: f/u in 4 week/s   Angélica was seen today for routine prenatal visit.    Diagnoses and all orders for this visit:    22 weeks gestation of pregnancy    Tail bone pain  -     Ambulatory Referral to Physical Therapy Evaluate and treat

## 2018-05-24 ENCOUNTER — HOSPITAL ENCOUNTER (OUTPATIENT)
Dept: PHYSICAL THERAPY | Facility: HOSPITAL | Age: 28
Setting detail: THERAPIES SERIES
Discharge: HOME | End: 2018-05-24

## 2018-05-24 DIAGNOSIS — M54.9 BACK PAIN AFFECTING PREGNANCY IN SECOND TRIMESTER: ICD-10-CM

## 2018-05-24 DIAGNOSIS — M53.3 COCCYX PAIN: Primary | ICD-10-CM

## 2018-05-24 DIAGNOSIS — O99.891 BACK PAIN AFFECTING PREGNANCY IN SECOND TRIMESTER: ICD-10-CM

## 2018-05-24 PROCEDURE — 97110 THERAPEUTIC EXERCISES: CPT | Performed by: PHYSICAL THERAPIST

## 2018-05-24 PROCEDURE — 97140 MANUAL THERAPY 1/> REGIONS: CPT | Performed by: PHYSICAL THERAPIST

## 2018-05-24 NOTE — THERAPY TREATMENT NOTE
Outpatient Physical Therapy Women's Health Treatment Note  [facility]     Patient Name: Angélica Hooper  : 1990  MRN: [id number]  Today's Date: 2018        Visit Date: 2018  Visit Number:   Recheck: 18  RTD: 18  % improvement: 25%    Visit Dx:    ICD-10-CM ICD-9-CM   1. Coccyx pain M53.3 724.79   2. Back pain affecting pregnancy in second trimester O26.892 646.83    M54.9 724.5       There is no problem list on file for this patient.             Women's Health     Row Name 18 1600             Pregnancy Questions    Number of Pregnancies 3  -SW      Number of Children 1   2 yo at home   -SW      How many weeks pregnant are you? 25 weeks  -SW      Type of Previous Deliveries Vaginal  -SW      Due Date 18  -SW         Subjective Pain    Able to rate subjective pain? yes  -SW      Pre-Treatment Pain Level 3  -SW        User Key  (r) = Recorded By, (t) = Taken By, (c) = Cosigned By    Initials Name Provider Type    SW Pat Perez, PT Physical Therapist              PT Ortho     Row Name 18 1600       Subjective Comments    Subjective Comments Good days and bad days.  Has been stretching and icing post activity.  Feels better in mornings.  Sleeps good.  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    -SW       Subjective Pain    Post-Treatment Pain Level 2  -SW       Posture/Observations    Posture- WNL Posture is WNL  -SW    Posture/Observations Comments Posture unchanged.  Tends to stand with rounded shlds and forward head.  Alignment ant rotatetd pelvis R side; sacrum prominent on L base.    -SW       Quarter Clearing    Quarter Clearing Lower Quarter Clearing  -SW       DTR- Lower Quarter Clearing    Patellar tendon (L2-4) 2- Normal response  -SW    Achilles tendon (S1-2) 2- Normal response  -SW       Neural Tension Signs- Lower Quarter Clearing    Slump Negative  -SW    SLR Negative  -SW       Sensory  Screen for Light Touch- Lower Quarter Clearing    L1 (inguinal area) Intact  -SW    L2 (anterior mid thigh) Intact  -SW    L3 (distal anterior thigh) Intact  -SW    L4 (medial lower leg/foot) Intact  -SW    L5 (lateral lower leg/great toe) Intact  -SW    S1 (bottom of foot) Intact  -SW       Myotomal Screen- Lower Quarter Clearing    Hip flexion (L2) WNL  -SW    Knee extension (L3) WNL  -SW    Ankle DF (L4) WNL  -SW    Great toe extension (L5) WNL  -SW    Ankle PF (S1) WNL  -SW    Knee flexion (S2) WNL  -SW       Lumbar ROM Screen- Lower Quarter Clearing    Lumbar Flexion Normal   pain at end range  -SW    Lumbar Extension Normal   pain at end range  -SW    Lumbar Lateral Flexion Normal  -SW    Lumbar Rotation Normal  -SW       SI/Hip Screen- Lower Quarter Clearing    ASIS compression Negative   improved s/s with compression   -SW    ASIS distraction Positive  -SW    Mary's/Oswaldo's test Positive  -SW    Posterior thigh sheer Negative  -SW       Special Tests/Palpation    Special Tests/Palpation Lumbar/SI  -SW       Lumbosacral Palpation    SI --   prominent on L base  -SW    Lumbosacral Segment Left:;Guarded/taut   improved alignment at L3-5  -SW    Quadratus Lumborum Left:;Guarded/taut  -SW    Erector Spinae (Paraspinals) Left:;Tender;Guarded/taut  -SW    Lumbosacral Palpation? Yes  -SW       Lumbosacral Accessory Motions    Lumbosacral Accessory Motions Tested? Yes  -SW    PA Glide- L1 WNL  -SW    PA Reading- L2 WNL  -SW    PA Reading- L3 --  -SW    PA glide- Sacral base --   prominent L base   -SW    Innominate rotation --   slight ant innom on R side   -SW       Lumbar/SI Special Tests    SLR (Neural Tension) Negative  -SW    SI Compression Test (SI Dysfunction) Negative   improved s/s  -SW    SI Distraction Test (SI Dysfunction) Positive  -SW    MARY (hip vs. SI Dysfunction) Negative  -SW      User Key  (r) = Recorded By, (t) = Taken By, (c) = Cosigned By    Initials Name Provider Type    KARIE Perez,  PT Physical Therapist                           PT Assessment/Plan     Row Name 05/24/18 1600          PT Assessment    Functional Limitations Performance in leisure activities;Performance in self-care ADL;Performance in work activities;Other (comment)   caregiving  -SW     Impairments Impaired postural alignment;Joint mobility;Muscle strength;Pain;Poor body mechanics;Posture  -SW     Assessment Comments Good tolerance to treatment this date.  Pain reduced.  Alignment improved post manual.  L3-5 with improved positioning and less tension, though still with muscle tension to LB/sacral region.  Compliant with HEP as requested.  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  STG 1 met and good progression toward other goals.   -SW     Rehab Potential Good  -SW     Patient/caregiver participated in establishment of treatment plan and goals Yes  -SW     Patient would benefit from skilled therapy intervention Yes  -SW        PT Plan    PT Frequency 1x/week  -SW     Predicted Duration of Therapy Intervention (OT Eval) 8-12 visits  -SW     PT Plan Comments Continue manual as indicated.  Advance stability training in SL on clamshells and Hip abd with PB  -SW       User Key  (r) = Recorded By, (t) = Taken By, (c) = Cosigned By    Initials Name Provider Type    SW Pat Perez, PT Physical Therapist                      Exercises     Row Name 05/24/18 1600             Subjective Comments    Subjective Comments Good days and bad days.  Has been stretching and icing post activity.  Feels better in mornings.  Sleeps good.  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    -SW         Subjective Pain    Able to rate subjective pain? yes  -SW      Pre-Treatment Pain Level 3  -SW      Post-Treatment Pain Level 2  -SW         Exercise 1    Exercise Name 1 angry cat stretch   -SW       Reps 1 10  -SW      Time 1 5  -SW         Exercise 2    Exercise Name 2 piriformis stretch   -SW      Reps 2 3  -SW      Time 2 30  -SW         Exercise 3    Exercise Name 3 hamstring stretch  -SW      Reps 3 3  -SW      Time 3 30  -SW         Exercise 4    Exercise Name 4 Adductor stretch bilaterally   -SW      Reps 4 3  -SW      Time 4 30  -SW         Exercise 5    Exercise Name 5 prone TKE with elbow raises  -SW      Reps 5 10  -SW         Exercise 6    Exercise Name 6 Prone leg extensions bilaterally   -SW      Reps 6 6  -SW         Exercise 7    Exercise Name 7 sacral flexion and ext with overpressure  -SW      Time 7 10  -SW        User Key  (r) = Recorded By, (t) = Taken By, (c) = Cosigned By    Initials Name Provider Type    KARIE ePrez, PT Physical Therapist           Manual Rx (last 36 hours)      Manual Treatments     Row Name 05/24/18 1700             Manual Rx 1    Manual Rx 1 Location Ant rotated innom on R side  -SW      Manual Rx 1 Type MET  -SW      Manual Rx 1 Duration 4  -SW         Manual Rx 2    Manual Rx 2 Location Sacral torsion L on R  -SW      Manual Rx 2 Type MET  -SW      Manual Rx 2 Duration 4  -SW         Manual Rx 3    Manual Rx 3 Location Sacral flexion /extension  AAROM  -SW         Manual Rx 4    Manual Rx 4 Location Lumbosacral release  -SW      Manual Rx 4 Type MFR/cupping   -SW      Manual Rx 4 Duration 12  -SW        User Key  (r) = Recorded By, (t) = Taken By, (c) = Cosigned By    Initials Name Provider Type    KARIE Pat Perez, PT Physical Therapist                          PT OP Goals     Row Name 05/24/18 1703 05/24/18 1600       PT Short Term Goals    STG Date to Achieve  -- 06/08/18  -SW    STG 1  -- independent HEP for stretching and stability   -SW    STG 1 Progress  -- Met  -SW    STG 2  -- alignment level bilaterally without need for MET to correct  -SW    STG 2 Progress  -- New  -SW    STG 3  -- Pain reduced such that she is able to mobilize in/out of  bed without assist  -    STG 3 Progress  -- Progressing  -    STG 4  -- Pain reduced to mild vs mod/severe  -    STG 4 Progress  -- Progressing  -    STG 5  -- Decrease spasm to L side paraspinals in lumbar region to allow palpation without withdrawl.   -    STG 5 Progress  -- Progressing  -       Long Term Goals    LTG Date to Achieve  -- 08/10/18  -    LTG 1  -- Subjectively improve 75% overall   -    LT 1 Progress  -- New  -    LTG 2  -- Patient to be able to complete  activities without increased pain or restrictions limiting function   -    LTG 2 Progress  -- New  -    LTG 3  -- Patient to be able to position for rest to allow at least 5-6 hours of consistent sleep without awakening due to pain   -    LTG 3 Progress  -- New  -       Time Calculation    PT Goal Re-Cert Due Date 06/08/18  -SW 06/08/18  -      User Key  (r) = Recorded By, (t) = Taken By, (c) = Cosigned By    Initials Name Provider Type    KARIE Perez, PT Physical Therapist                          Time Calculation:   Start Time: 1605  Stop Time: 1703  Time Calculation (min): 58 min    Therapy Charges for Today     Code Description Service Date Service Provider Modifiers Qty    35183360744 HC PT MANUAL THERAPY EA 15 MIN 5/24/2018 Pat Perez, PT GP 2    18831984971 HC PT THER PROC EA 15 MIN 5/24/2018 Pat Perez, PT GP 2                    Pat Perez, PT  5/24/2018

## 2018-05-29 ENCOUNTER — HOSPITAL ENCOUNTER (OUTPATIENT)
Dept: PHYSICAL THERAPY | Facility: HOSPITAL | Age: 28
Setting detail: THERAPIES SERIES
Discharge: HOME | End: 2018-05-29

## 2018-05-29 PROCEDURE — 97140 MANUAL THERAPY 1/> REGIONS: CPT | Performed by: PHYSICAL THERAPIST

## 2018-05-29 PROCEDURE — 97110 THERAPEUTIC EXERCISES: CPT | Performed by: PHYSICAL THERAPIST

## 2018-05-29 NOTE — THERAPY TREATMENT NOTE
Outpatient Physical Therapy Women's Health Treatment Note  [facility]     Patient Name: Angélica Hooper  : 1990  MRN: [id number]  Today's Date: 2018        Visit Date: 2018  Visit Number: 3/3  Recheck: 18  RTD: 18  % improvement: 40%    Visit Dx:  No diagnosis found.    There is no problem list on file for this patient.             Women's Health     Row Name 18 1000             Subjective Comments    Subjective Comments Sore on Saturday but feeling ok now.  Getting in and out of bed by herself now.  Some days easier than others.    -SW         Pregnancy Questions    Number of Pregnancies 3  -SW      Number of Children 1  -SW      How many weeks pregnant are you? 26 weeks  -SW      Type of Previous Deliveries Vaginal  -SW      Due Date 18  -        User Key  (r) = Recorded By, (t) = Taken By, (c) = Cosigned By    Initials Name Provider Type    KARIE Perez, PT Physical Therapist              PT Ortho     Row Name 18 1000       Subjective Pain    Able to rate subjective pain? yes  -SW    Pre-Treatment Pain Level 0  -SW    Post-Treatment Pain Level 0  -SW       Posture/Observations    Posture- WNL Posture is WNL  -SW    Posture/Observations Comments Pelvic alignment normal without MET required.  More stabilized through pelvic girdle.  Tender around R hip joint glut attachment.  Slight tenderness over GTB R side.  -SW       Quarter Clearing    Quarter Clearing Lower Quarter Clearing  -SW       DTR- Lower Quarter Clearing    Patellar tendon (L2-4) 2- Normal response  -SW    Achilles tendon (S1-2) 2- Normal response  -SW       Neural Tension Signs- Lower Quarter Clearing    Slump Negative  -SW    SLR Negative  -SW       Sensory Screen for Light Touch- Lower Quarter Clearing    L1 (inguinal area) Intact  -SW    L2 (anterior mid thigh) Intact  -SW    L3 (distal anterior thigh) Intact  -SW    L4 (medial lower leg/foot) Intact  -SW    L5 (lateral lower  leg/great toe) Intact  -SW    S1 (bottom of foot) Intact  -SW       Myotomal Screen- Lower Quarter Clearing    Hip flexion (L2) WNL  -SW    Knee extension (L3) WNL  -SW    Ankle DF (L4) WNL  -SW    Great toe extension (L5) WNL  -SW    Ankle PF (S1) WNL  -SW    Knee flexion (S2) WNL  -SW       Lumbar ROM Screen- Lower Quarter Clearing    Lumbar Flexion Normal   pain at end range  -SW    Lumbar Extension Normal   pain at end range  -SW    Lumbar Lateral Flexion Normal  -SW    Lumbar Rotation Normal  -SW       SI/Hip Screen- Lower Quarter Clearing    ASIS compression Negative   improved s/s with compression   -SW    ASIS distraction Positive  -SW    Mary's/Oswaldo's test Positive  -SW    Posterior thigh sheer Negative  -SW       Special Tests/Palpation    Special Tests/Palpation Lumbar/SI  -SW       Lumbosacral Palpation    SI --   aligned this visit  -SW    Lumbosacral Segment --   less trigger noted to LS this date.   -SW    Quadratus Lumborum --   mild tenderness but improved tissue to QL  -SW    Erector Spinae (Paraspinals) --   mild tenderness but improved   -SW    Greater Tuberosity --   nontender, but surrounding area tender R>L  -SW    Lumbosacral Palpation? Yes  -SW       Lumbosacral Accessory Motions    Lumbosacral Accessory Motions Tested? Yes  -SW    PA Glide- L1 WNL  -SW    PA Henriette- L2 WNL  -SW    PA glide- Sacral base --   aligned this date.   -SW    Innominate rotation --   aligned this date.   -SW       Lumbar/SI Special Tests    SLR (Neural Tension) Negative  -SW    SI Compression Test (SI Dysfunction) Negative   improved s/s  -SW    SI Distraction Test (SI Dysfunction) Positive  -SW    MARY (hip vs. SI Dysfunction) Negative  -SW      User Key  (r) = Recorded By, (t) = Taken By, (c) = Cosigned By    Initials Name Provider Type    SW Pat Perez, PT Physical Therapist                           PT Assessment/Plan     Row Name 05/29/18 1000          PT Assessment    Functional Limitations  Performance in leisure activities;Performance in self-care ADL;Performance in work activities;Other (comment)   caregiving  -     Impairments Impaired postural alignment;Joint mobility;Muscle strength;Pain;Poor body mechanics;Posture  -     Assessment Comments Good alignment this visit without MET required.  Tissues/muscles to LS and erector spinae region are with improved tension and less responsive to touch.  More localized this date to R hip/buttock region.  Responded well to manual with reduced pain post treatment.  Needs additional training with core stability.  Motor recruitment difficulty with pelvic brace concept with improved with practice.  AROM to R hip aggravates s/s somewhat.  But tolerable for assigned exercises.    -SW     Rehab Potential Good  -SW     Patient/caregiver participated in establishment of treatment plan and goals Yes  -SW     Patient would benefit from skilled therapy intervention Yes  -SW        PT Plan    PT Frequency 1x/week  -SW     Predicted Duration of Therapy Intervention (OT Eval) 8-12 visit   -SW     PT Plan Comments Continue with manual to hip and paraspinals as indicated.  Review PB concept in supine with knee out next phase.  Attempt seated stability as able.   -       User Key  (r) = Recorded By, (t) = Taken By, (c) = Cosigned By    Initials Name Provider Type    SW Pat Perez, PT Physical Therapist                      Exercises     Row Name 05/29/18 1000             Subjective Comments    Subjective Comments Sore on Saturday but feeling ok now.  Getting in and out of bed by herself now.  Some days easier than others.    -SW         Subjective Pain    Able to rate subjective pain? yes  -SW      Pre-Treatment Pain Level 0  -SW      Post-Treatment Pain Level 0  -SW         Exercise 1    Exercise Name 1 Hamstring stretch bilaterally   -      Reps 1 3  -SW      Time 1 30  -SW         Exercise 2    Exercise Name 2 Adductor Stretch bilaterally   -      Reps 2 3   -SW      Time 2 30  -SW         Exercise 3    Exercise Name 3 Pelvic Brace supine   -SW      Reps 3 --  -SW      Time 3 6  -SW      Additional Comments Patient able to return demo of pelvic brace in supine with Multifidus, PF and then TA for recruitment patterns.  Able to hold x 5 sec  without rest.    -SW         Exercise 4    Exercise Name 4 Pelvic brace with knee out bilaterally  -SW      Reps 4 5  -SW         Exercise 5    Exercise Name 5 PB clamshells  -SW      Sets 5 2  -SW      Reps 5 8  -SW         Exercise 6    Exercise Name 6 PB hip abd  -SW      Reps 6 2  -SW      Time 6 8  -SW        User Key  (r) = Recorded By, (t) = Taken By, (c) = Cosigned By    Initials Name Provider Type    KARIE Perez, PT Physical Therapist           Manual Rx (last 36 hours)      Manual Treatments     Row Name 05/29/18 1100             Manual Rx 1    Manual Rx 1 Location Lumbosacral release  -SW      Manual Rx 1 Type MFR and cupping   -SW      Manual Rx 1 Duration 10  -SW         Manual Rx 2    Manual Rx 2 Location R piriformis and glut  -SW      Manual Rx 2 Type MFR/cupping   -SW      Manual Rx 2 Duration 15  -SW         Manual Rx 3    Manual Rx 3 Location alignment check supine/prone  -SW      Manual Rx 3 Duration 4  -SW        User Key  (r) = Recorded By, (t) = Taken By, (c) = Cosigned By    Initials Name Provider Type     Pat Perez, PT Physical Therapist                          PT OP Goals     Row Name 05/29/18 1000          PT Short Term Goals    STG Date to Achieve 06/08/18  -SW     STG 1 independent HEP for stretching and stability   -SW     STG 1 Progress Met  -SW     STG 2 alignment level bilaterally without need for MET to correct  -SW     STG 2 Progress New  -SW     STG 3 Pain reduced such that she is able to mobilize in/out of bed without assist  -SW     STG 3 Progress Met  -SW     STG 4 Pain reduced to mild vs mod/severe  -SW     STG 4 Progress Ongoing;Progressing  -SW     STG 5 Decrease spasm to  L side paraspinals in lumbar region to allow palpation without withdrawl.   -SW     STG 5 Progress Met  -        Long Term Goals    LTG Date to Achieve 08/10/18  -     LTG 1 Subjectively improve 75% overall   -     LTG 1 Progress New  -     LTG 2 Patient to be able to complete  activities without increased pain or restrictions limiting function   -SW     LTG 2 Progress New  -     LTG 3 Patient to be able to position for rest to allow at least 5-6 hours of consistent sleep without awakening due to pain   -     LTG 3 Progress New  -        Time Calculation    PT Goal Re-Cert Due Date 06/08/18  -       User Key  (r) = Recorded By, (t) = Taken By, (c) = Cosigned By    Initials Name Provider Type    KARIE Perez PT Physical Therapist           Therapy Education  Given: HEP  Program: New  How Provided: Verbal, Demonstration, Written  Provided to: Patient  Level of Understanding: Teach back education performed, Verbalized, Demonstrated              Time Calculation:   Start Time: 1025  Stop Time: 1132  Time Calculation (min): 67 min    Therapy Charges for Today     Code Description Service Date Service Provider Modifiers Qty    80561276058  PT MANUAL THERAPY EA 15 MIN 5/29/2018 Pat Perez, PT GP 2    [id number] HC PT THER PROC EA 15 MIN 5/29/2018 Pat Perez, PT GP 2                    Pat Perez, PT  5/29/2018

## 2018-06-06 ENCOUNTER — HOSPITAL ENCOUNTER (OUTPATIENT)
Dept: PHYSICAL THERAPY | Facility: HOSPITAL | Age: 28
Setting detail: THERAPIES SERIES
Discharge: HOME | End: 2018-06-06

## 2018-06-06 ENCOUNTER — ROUTINE PRENATAL (OUTPATIENT)
Dept: OBSTETRICS AND GYNECOLOGY | Facility: CLINIC | Age: 28
End: 2018-06-06

## 2018-06-06 VITALS — WEIGHT: 189 LBS | DIASTOLIC BLOOD PRESSURE: 74 MMHG | SYSTOLIC BLOOD PRESSURE: 124 MMHG | BODY MASS INDEX: 27.91 KG/M2

## 2018-06-06 DIAGNOSIS — O99.891 BACK PAIN AFFECTING PREGNANCY IN SECOND TRIMESTER: ICD-10-CM

## 2018-06-06 DIAGNOSIS — M53.3 COCCYX PAIN: Primary | ICD-10-CM

## 2018-06-06 DIAGNOSIS — Z3A.26 26 WEEKS GESTATION OF PREGNANCY: ICD-10-CM

## 2018-06-06 DIAGNOSIS — Z36.9 ANTENATAL SCREENING ENCOUNTER: Primary | ICD-10-CM

## 2018-06-06 DIAGNOSIS — M54.9 BACK PAIN AFFECTING PREGNANCY IN SECOND TRIMESTER: ICD-10-CM

## 2018-06-06 PROCEDURE — 97110 THERAPEUTIC EXERCISES: CPT | Performed by: PHYSICAL THERAPIST

## 2018-06-06 PROCEDURE — 97140 MANUAL THERAPY 1/> REGIONS: CPT | Performed by: PHYSICAL THERAPIST

## 2018-06-06 PROCEDURE — 0502F SUBSEQUENT PRENATAL CARE: CPT | Performed by: ADVANCED PRACTICE MIDWIFE

## 2018-06-06 NOTE — PROGRESS NOTES
CC: ROXANE visit, history reviewed, no changes noted    ROS:Positive No complaints   Negative leaking fluid from the vagina, swelling in her legs, headache, visual changes, low back pain and heartburn      Educated on:! Hour GTT & CBC next visit, PTL, VB    A/Plan: f/u in 2 week/s   Angélica was seen today for routine prenatal visit.    Diagnoses and all orders for this visit:     screening encounter  -     Glucose, Post 50 Gm Glucola; Future  -     CBC & Differential; Future    26 weeks gestation of pregnancy

## 2018-06-06 NOTE — THERAPY TREATMENT NOTE
Outpatient Physical Therapy Women's Health Progress Note  [facility]     Patient Name: Angélica Hooper  : 1990  MRN: [id number]  Today's Date: 2018        Visit Date: 2018  Visit Number:   Recheck: 18  RTD: today  % improvement: 50%    Visit Dx:    ICD-10-CM ICD-9-CM   1. Coccyx pain M53.3 724.79   2. Back pain affecting pregnancy in second trimester O26.892 646.83    M54.9 724.5       There is no problem list on file for this patient.             Women's Health     Row Name 18 1100             Pregnancy Questions    Number of Pregnancies 3  -SW      Number of Children 1  -SW      How many weeks pregnant are you? 27 weeks  -SW      Type of Previous Deliveries Vaginal  -SW      Due Date 18  -SW         Subjective Pain    Able to rate subjective pain? yes  -SW      Pre-Treatment Pain Level 0  -SW        User Key  (r) = Recorded By, (t) = Taken By, (c) = Cosigned By    Initials Name Provider Type    SW Pat Perez, PT Physical Therapist              PT Ortho     Row Name 18 1100       Subjective Comments    Subjective Comments Travelled to [place] this weekend.  Walked about 5 miles [place] the [place].  Hurt in LB primarily during activity.  Continued with rest though possible reduction some after prolonged resting.  Remained sore post travel for a few days.  Hips feel better overall.  Just sore.  Reports pelvic brace was very difficult to complete due to weakness of PF. Prior tear with delivery.  No history of UI but feels she can't hold the contraction like she should.    -SW       Subjective Pain    Post-Treatment Pain Level 0  -SW       Posture/Observations    Posture- WNL Posture is WNL  -SW    Posture/Observations Comments Gait slow this visit and guarded with sit to stand t/f.  Tender at piriformis muscle bilaterally.  Alignment level bilaterally and sacrum in good position without torsion.  Trigger noted in L piriformis and glut.    -SW        Quarter Clearing    Quarter Clearing Lower Quarter Clearing  -SW       DTR- Lower Quarter Clearing    Patellar tendon (L2-4) 2- Normal response  -SW    Achilles tendon (S1-2) 2- Normal response  -SW       Neural Tension Signs- Lower Quarter Clearing    Slump Negative  -SW    SLR Negative  -SW       Sensory Screen for Light Touch- Lower Quarter Clearing    L1 (inguinal area) Intact  -SW    L2 (anterior mid thigh) Intact  -SW    L3 (distal anterior thigh) Intact  -SW    L4 (medial lower leg/foot) Intact  -SW    L5 (lateral lower leg/great toe) Intact  -SW    S1 (bottom of foot) Intact  -SW       Myotomal Screen- Lower Quarter Clearing    Hip flexion (L2) WNL  -SW    Knee extension (L3) WNL  -SW    Ankle DF (L4) WNL  -SW    Great toe extension (L5) WNL  -SW    Ankle PF (S1) WNL  -SW    Knee flexion (S2) WNL  -SW       Lumbar ROM Screen- Lower Quarter Clearing    Lumbar Flexion Normal  -SW    Lumbar Extension Normal  -SW    Lumbar Lateral Flexion Normal  -SW    Lumbar Rotation Normal  -SW       SI/Hip Screen- Lower Quarter Clearing    ASIS compression Negative   improved s/s with compression   -SW    ASIS distraction Positive  -SW    Mary's/Oswaldo's test Negative  -SW    Posterior thigh sheer Negative  -SW       Special Tests/Palpation    Special Tests/Palpation Lumbar/SI  -SW       Lumbosacral Palpation    SI --   aligned this visit  -SW    Lumbosacral Segment --   less trigger noted to LS this date.   -SW    Piriformis Bilateral:;Trigger point  -SW    Gluteus Wilfrido Bilateral:;Tender  -    Quadratus Lumborum --  -SW    Erector Spinae (Paraspinals) Bilateral:;Guarded/taut  -SW    Greater Tuberosity --   nontender, but surrounding area tender R>L  -SW    Lumbosacral Palpation? Yes  -SW       Lumbosacral Accessory Motions    Lumbosacral Accessory Motions Tested? Yes  -SW    PA Glide- L1 WNL  -SW    PA [place]- L2 WNL  -SW    PA glide- Sacral base --   aligned this date.   -SW    Innominate rotation --   aligned this  date.   -SW       Lumbar/SI Special Tests    SLR (Neural Tension) Negative  -SW    SI Compression Test (SI Dysfunction) Negative   improved s/s  -SW    SI Distraction Test (SI Dysfunction) Positive  -    BENNY (hip vs. SI Dysfunction) Negative  -      User Key  (r) = Recorded By, (t) = Taken By, (c) = Cosigned By    Initials Name Provider Type    KARIE Perez, PT Physical Therapist                           PT Assessment/Plan     Row Name 06/06/18 1100          PT Assessment    Functional Limitations Performance in leisure activities;Performance in self-care ADL;Performance in work activities;Other (comment)   caregiving  -     Impairments Impaired postural alignment;Joint mobility;Muscle strength;Pain;Poor body mechanics;Posture  -SW     Assessment Comments No advancement of exercise this date as patient is struggling with PF recruitment with isolated or combined movement.  Focus today on manual work for fascial release for improved mobility. Responded well with pain reduction.  Discussed perineal massage this date due to tearing experienced with first child.  Patient verbalized understanding and importance.  STG met up to this visit but needs consistency of goal attainment to ensure success.    -SW     Rehab Potential Good  -SW     Patient/caregiver participated in establishment of treatment plan and goals Yes  -SW     Patient would benefit from skilled therapy intervention Yes  -SW        PT Plan    PT Frequency 1x/week  -SW     Predicted Duration of Therapy Intervention (OT Eval) 8-12 visits  -     PT Plan Comments Revisist PF recruitment/ pelvic brace concept.  Advance to supine knee out or seated stabilization exercises.    -SW       User Key  (r) = Recorded By, (t) = Taken By, (c) = Cosigned By    Initials Name Provider Type    KARIE Perez PT Physical Therapist                      Exercises     Row Name 06/06/18 1100             Subjective Comments    Subjective Comments Travelled  "to [place] this weekend.  Walked about 5 miles while visiting the zoo.  Hurt in LB primarily during activity.  Continued with rest though possible reduction some after prolonged resting.  Remained sore post travel for a few days.  Hips feel better overall.  Just sore.  Reports pelvic brace was very difficult to complete due to weakness of PF. Prior tear with delivery.  No history of UI but feels she can't hold the contraction like she should.    -         Subjective Pain    Able to rate subjective pain? yes  -      Pre-Treatment Pain Level 0  -SW      Post-Treatment Pain Level 0  -SW         Exercise 1    Exercise Name 1 Perineal massage  -SW      Reps 1 --  -SW      Time 1 --  -SW      Additional Comments Perineal massage discussed with patient due to presence of significant tear with first.  Patient verbalized understanding and agreed as to its importance for success.    -         Exercise 2    Exercise Name 2 Pelvic floor contraction   -SW      Sets 2 3  -SW      Reps 2 5  -SW      Time 2 --  -SW      Additional Comments Isolated QF contraction to find muscle.  Success with \"finding\" the muscle.    -SW         Exercise 3    Exercise Name 3 PF endurance contraction supine   -SW      Sets 3 3  -SW      Reps 3 5  -SW      Time 3 3  -SW      Additional Comments Endurance of muscles are weak. 2-3 sec hold max.  Encouraged isolated movement this visit as to focus on \"weakest\" link prior to incorporating brace concept as patient found that exercise very difficult.    -         Exercise 4    Exercise Name 4 --  -SW      Reps 4 --  -SW         Exercise 5    Exercise Name 5 --  -SW      Sets 5 --  -SW      Reps 5 --  -SW         Exercise 6    Exercise Name 6 --  -SW      Reps 6 --  -SW      Time 6 --  -SW        User Key  (r) = Recorded By, (t) = Taken By, (c) = Cosigned By    Initials Name Provider Type    KARIE Perez, PT Physical Therapist           Manual Rx (last 36 hours)      Manual Treatments     " Row Name 06/06/18 1700             Manual Rx 1    Manual Rx 1 Location Lumbosacral release  -SW      Manual Rx 1 Type MFR and cupping   -SW      Manual Rx 1 Duration 15  -SW         Manual Rx 2    Manual Rx 2 Location B piriformis and glut  -SW      Manual Rx 2 Type MFR/cupping   -SW      Manual Rx 2 Duration 20  -SW         Manual Rx 3    Manual Rx 3 Location alignment check supine/prone  -SW      Manual Rx 3 Duration 4  -SW        User Key  (r) = Recorded By, (t) = Taken By, (c) = Cosigned By    Initials Name Provider Type    KARIE Perez, PT Physical Therapist                          PT OP Goals     Row Name 06/06/18 1100          PT Short Term Goals    STG Date to Achieve 06/08/18  -SW     STG 1 independent HEP for stretching and stability   -SW     STG 1 Progress Met  -SW     STG 2 alignment level bilaterally without need for MET to correct  -SW     STG 2 Progress Met;Ongoing  -SW     STG 3 Pain reduced such that she is able to mobilize in/out of bed without assist  -SW     STG 3 Progress Met  -SW     STG 4 Pain reduced to mild vs mod/severe  -SW     STG 4 Progress Met  -SW     STG 5 Decrease spasm to L side paraspinals in lumbar region to allow palpation without withdrawl.   -SW     STG 5 Progress Met  -SW        Long Term Goals    LTG Date to Achieve 08/10/18  -SW     LTG 1 Subjectively improve 75% overall   -SW     LTG 1 Progress New  -SW     LTG 2 Patient to be able to complete  activities without increased pain or restrictions limiting function   -SW     LTG 2 Progress New  -SW     LTG 3 Patient to be able to position for rest to allow at least 5-6 hours of consistent sleep without awakening due to pain   -SW     LTG 3 Progress New  -        Time Calculation    PT Goal Re-Cert Due Date 07/06/18  -       User Key  (r) = Recorded By, (t) = Taken By, (c) = Cosigned By    Initials Name Provider Type    KARIE Perez, PT Physical Therapist           Therapy Education  Education  Details: Perineal massage explained; isolated PF contraction and its importance  Given: Symptoms/condition management, Other (comment), HEP  Program: Reinforced  How Provided: Verbal, Demonstration  Provided to: Patient  Level of Understanding: Teach back education performed, Verbalized, Demonstrated              Time Calculation:   Start Time: 1120  Stop Time: 1215  Time Calculation (min): 55 min    Therapy Charges for Today     Code Description Service Date Service Provider Modifiers Qty    64113787180  PT THER PROC EA 15 MIN 6/6/2018 Pat Perez, PT GP 1    [id number]  PT MANUAL THERAPY EA 15 MIN 6/6/2018 Pat Perez, PT GP 3                    Pat Perez, PT  6/6/2018

## 2018-06-20 ENCOUNTER — HOSPITAL ENCOUNTER (OUTPATIENT)
Dept: PHYSICAL THERAPY | Facility: HOSPITAL | Age: 28
Setting detail: THERAPIES SERIES
Discharge: HOME | End: 2018-06-20

## 2018-06-20 ENCOUNTER — LAB (OUTPATIENT)
Dept: LAB | Facility: HOSPITAL | Age: 28
End: 2018-06-20

## 2018-06-20 ENCOUNTER — ROUTINE PRENATAL (OUTPATIENT)
Dept: OBSTETRICS AND GYNECOLOGY | Facility: CLINIC | Age: 28
End: 2018-06-20

## 2018-06-20 VITALS — SYSTOLIC BLOOD PRESSURE: 115 MMHG | WEIGHT: 192 LBS | DIASTOLIC BLOOD PRESSURE: 72 MMHG | BODY MASS INDEX: 28.35 KG/M2

## 2018-06-20 DIAGNOSIS — M54.9 BACK PAIN AFFECTING PREGNANCY IN SECOND TRIMESTER: ICD-10-CM

## 2018-06-20 DIAGNOSIS — M53.3 COCCYX PAIN: Primary | ICD-10-CM

## 2018-06-20 DIAGNOSIS — Z34.80 ENCOUNTER FOR SUPERVISION OF NORMAL PREGNANCY IN MULTIGRAVIDA: ICD-10-CM

## 2018-06-20 DIAGNOSIS — Z3A.28 28 WEEKS GESTATION OF PREGNANCY: Primary | ICD-10-CM

## 2018-06-20 DIAGNOSIS — O99.891 BACK PAIN AFFECTING PREGNANCY IN SECOND TRIMESTER: ICD-10-CM

## 2018-06-20 DIAGNOSIS — Z36.9 ANTENATAL SCREENING ENCOUNTER: ICD-10-CM

## 2018-06-20 LAB
DEPRECATED RDW RBC AUTO: 45.3 FL (ref 36.4–46.3)
ERYTHROCYTE [DISTWIDTH] IN BLOOD BY AUTOMATED COUNT: 13.8 % (ref 11.5–14.5)
GLUCOSE 1H P 100 G GLC PO SERPL-MCNC: 113 MG/DL (ref 60–140)
HCT VFR BLD AUTO: 34.9 % (ref 35–45)
HGB BLD-MCNC: 12 G/DL (ref 12–15.5)
LYMPHOCYTES # BLD MANUAL: 1.74 10*3/MM3 (ref 0.6–4.2)
LYMPHOCYTES NFR BLD MANUAL: 12 % (ref 0–12)
LYMPHOCYTES NFR BLD MANUAL: 23 % (ref 10–50)
MCH RBC QN AUTO: 31 PG (ref 26.5–34)
MCHC RBC AUTO-ENTMCNC: 34.4 G/DL (ref 31.4–36)
MCV RBC AUTO: 90.2 FL (ref 80–98)
MONOCYTES # BLD AUTO: 0.91 10*3/MM3 (ref 0–0.9)
NEUTROPHILS # BLD AUTO: 4.78 10*3/MM3 (ref 2–8.6)
NEUTROPHILS NFR BLD MANUAL: 63 % (ref 37–80)
PLATELET # BLD AUTO: 195 10*3/MM3 (ref 150–450)
PMV BLD AUTO: 10 FL (ref 8–12)
RBC # BLD AUTO: 3.87 10*6/MM3 (ref 3.77–5.16)
RBC MORPH BLD: NORMAL
SMALL PLATELETS BLD QL SMEAR: ADEQUATE
VARIANT LYMPHS NFR BLD MANUAL: 2 % (ref 0–5)
WBC MORPH BLD: NORMAL
WBC NRBC COR # BLD: 7.58 10*3/MM3 (ref 3.2–9.8)

## 2018-06-20 PROCEDURE — 85027 COMPLETE CBC AUTOMATED: CPT

## 2018-06-20 PROCEDURE — 97110 THERAPEUTIC EXERCISES: CPT | Performed by: PHYSICAL THERAPIST

## 2018-06-20 PROCEDURE — 97140 MANUAL THERAPY 1/> REGIONS: CPT | Performed by: PHYSICAL THERAPIST

## 2018-06-20 PROCEDURE — 82950 GLUCOSE TEST: CPT

## 2018-06-20 PROCEDURE — 0502F SUBSEQUENT PRENATAL CARE: CPT | Performed by: ADVANCED PRACTICE MIDWIFE

## 2018-06-20 PROCEDURE — 36415 COLL VENOUS BLD VENIPUNCTURE: CPT

## 2018-06-20 PROCEDURE — 85007 BL SMEAR W/DIFF WBC COUNT: CPT

## 2018-06-20 NOTE — THERAPY PROGRESS REPORT/RE-CERT
Outpatient Physical Therapy Women's Health Progress Note  [facility]     Patient Name: Angélica Hooper  : 1990  MRN: [id number]  Today's Date: 2018        Visit Date: 2018  Visit Number:   Recheck: 18  RTD: today, again in 2 weeks  Subjective Improvement: 60-70%    Visit Dx:    ICD-10-CM ICD-9-CM   1. Coccyx pain M53.3 724.79   2. Back pain affecting pregnancy in second trimester O26.892 646.83    M54.9 724.5       There is no problem list on file for this patient.             Women's Health     Row Name 18 0900             Pregnancy Questions    Number of Pregnancies 3  -SW      Number of Children 1  -SW      How many weeks pregnant are you? 29 weeks  -SW      Type of Previous Deliveries Vaginal  -SW      Due Date 18  -SW         Subjective Pain    Able to rate subjective pain? yes  -SW      Pre-Treatment Pain Level 0  -SW        User Key  (r) = Recorded By, (t) = Taken By, (c) = Cosigned By    Initials Name Provider Type    SW Pat Perez, PT Physical Therapist              PT Ortho     Row Name 18 0900       Subjective Comments    Subjective Comments Feeling good and had a good week minus one day.  Wore flip flops to [employer] on Friday and inc pain noted.  Glucose test this am.  No results yet.  MD in 2 weeks.  Believes she would like to try 2 week follow ups.    -SW       Subjective Pain    Post-Treatment Pain Level 0  -SW       Posture/Observations    Posture- WNL Posture is WNL  -SW    Posture/Observations Comments Normal step and stride length noted with gait.  No distress.  Good spirits.  Mood and judgement normal.    -SW       Quarter Clearing    Quarter Clearing Lower Quarter Clearing  -SW       DTR- Lower Quarter Clearing    Patellar tendon (L2-4) 2- Normal response  -SW    Achilles tendon (S1-2) 2- Normal response  -SW       Neural Tension Signs- Lower Quarter Clearing    Slump Negative  -SW    SLR Negative  -SW       Sensory Screen for Light  Touch- Lower Quarter Clearing    L1 (inguinal area) Intact  -SW    L2 (anterior mid thigh) Intact  -SW    L3 (distal anterior thigh) Intact  -SW    L4 (medial lower leg/foot) Intact  -SW    L5 (lateral lower leg/great toe) Intact  -SW    S1 (bottom of foot) Intact  -SW       Myotomal Screen- Lower Quarter Clearing    Hip flexion (L2) WNL  -SW    Knee extension (L3) WNL  -SW    Ankle DF (L4) WNL  -SW    Great toe extension (L5) WNL  -SW    Ankle PF (S1) WNL  -SW    Knee flexion (S2) WNL  -SW       Lumbar ROM Screen- Lower Quarter Clearing    Lumbar Flexion Normal  -SW    Lumbar Extension Normal  -SW    Lumbar Lateral Flexion Normal  -SW    Lumbar Rotation Normal  -SW       SI/Hip Screen- Lower Quarter Clearing    ASIS compression Negative  -SW    ASIS distraction Positive  -SW    Benny's/Oswaldo's test Negative  -SW    Posterior thigh sheer Negative  -SW       Special Tests/Palpation    Special Tests/Palpation Lumbar/SI  -SW       Lumbosacral Palpation    SI --   aligned this visit; L side tender with palaption ttp  -SW    Lumbosacral Segment Left:;Guarded/taut   L side tension noted.  -SW    Piriformis Left:;Trigger point  -SW    Gluteus Wilfrido Left:;Tender  -SW    Erector Spinae (Paraspinals) Left:;Guarded/taut  -SW    Greater Tuberosity --  -SW    Lumbosacral Palpation? Yes  -SW       Lumbosacral Accessory Motions    Lumbosacral Accessory Motions Tested? Yes  -SW    PA Glide- L1 WNL  -SW    PA [place]- L2 WNL  -SW    PA glide- Sacral base --   aligned this date.   -SW    Innominate rotation --   aligned this date.   -SW       Lumbar/SI Special Tests    SLR (Neural Tension) Negative  -SW    SI Compression Test (SI Dysfunction) Negative  -SW    SI Distraction Test (SI Dysfunction) Negative  -SW    BENNY (hip vs. SI Dysfunction) Negative  -SW      User Key  (r) = Recorded By, (t) = Taken By, (c) = Cosigned By    Initials Name Provider Type    KARIE Perez, PT Physical Therapist                           PT  Assessment/Plan     Row Name 06/20/18 0900          PT Assessment    Functional Limitations Performance in leisure activities;Performance in self-care ADL;Performance in work activities;Other (comment)   caregiving  -     Impairments Impaired postural alignment;Joint mobility;Muscle strength;Pain;Poor body mechanics;Posture  -     Assessment Comments Patient is doing well with current treatment regimen.  Pain has been much improved.  Overall functional also improving.  Noted one bad day since having been here last and contributes to poor shoe choice for WB activity.  Patient is showing improved stability to pelvic girdle.  Tolerating stabilization exercise well.  At this time, all STG met. Did not advance stabilization due to patient difficulty in maintaining PF recruitment in duration of 10-20 sec.    -     Rehab Potential Good  -SW     Patient/caregiver participated in establishment of treatment plan and goals Yes  -SW     Patient would benefit from skilled therapy intervention Yes  -SW        PT Plan    PT Frequency --   FU in 2 week  -     Predicted Duration of Therapy Intervention (Therapy Eval) 8-12 visits  -     PT Plan Comments Advance stability as able.  Consider standing stab with B shoulder ext, Standing SLR.  -       User Key  (r) = Recorded By, (t) = Taken By, (c) = Cosigned By    Initials Name Provider Type    SW Pat Perez, PT Physical Therapist                      Exercises     Row Name 06/20/18 1500 06/20/18 0900          Subjective Comments    Subjective Comments  -- Feeling good and had a good week minus one day.  Wore flip flops to [place] on Friday and inc pain noted.  Glucose test this am.  No results yet.  MD in 2 weeks.  Believes she would like to try 2 week follow ups.    -        Subjective Pain    Able to rate subjective pain?  -- yes  -SW     Pre-Treatment Pain Level  -- 0  -SW     Post-Treatment Pain Level  -- 0  -SW        Total Minutes    29325 - PT Manual Therapy  Minutes 34  -SW  --        Exercise 1    Exercise Name 1  -- hamstring stretch  -SW     Reps 1  -- 1  -SW     Time 1  -- 1min  -SW        Exercise 2    Exercise Name 2  -- adductor stretch   -SW     Reps 2  -- 1  -SW     Time 2  -- 1min  -SW        Exercise 3    Exercise Name 3  -- seated neutral dynadisc with clocks RTB  -SW     Sets 3  -- 2  -SW     Reps 3  -- 10  -SW        Exercise 4    Exercise Name 4  -- seated neutral with rows RTB  -SW     Sets 4  -- 2  -SW     Reps 4  -- 10  -SW        Exercise 5    Exercise Name 5  -- PB clamshells  -SW     Sets 5  -- 2  -SW     Reps 5  -- 8  -SW        Exercise 6    Exercise Name 6  -- PB hip abd  -SW     Reps 6  -- 2  -SW     Time 6  -- 8  -SW       User Key  (r) = Recorded By, (t) = Taken By, (c) = Cosigned By    Initials Name Provider Type    KARIE Perez, PT Physical Therapist           Manual Rx (last 36 hours)      Manual Treatments     Row Name 06/20/18 1500             Total Minutes    05725 - PT Manual Therapy Minutes 34  -SW         Manual Rx 1    Manual Rx 1 Location alignment check supine and prone  -SW      Manual Rx 1 Duration 4  -SW         Manual Rx 2    Manual Rx 2 Location piriformis release L>R  -SW      Manual Rx 2 Type MFR and cupping  -SW      Manual Rx 2 Duration 20  -SW         Manual Rx 3    Manual Rx 3 Location AP sacral glides and float  -SW      Manual Rx 3 Type PIVM and MFR  -SW      Manual Rx 3 Duration 10  -SW        User Key  (r) = Recorded By, (t) = Taken By, (c) = Cosigned By    Initials Name Provider Type    KARIE Perez, PT Physical Therapist                          PT OP Goals     Row Name 06/20/18 0900          PT Short Term Goals    STG Date to Achieve --  -SW     STG 1 independent HEP for stretching and stability   -SW     STG 1 Progress Met  -SW     STG 2 alignment level bilaterally without need for MET to correct  -SW     STG 2 Progress Met  -SW     STG 3 Pain reduced such that she is able to mobilize in/out of  bed without assist  -     STG 3 Progress Met  -     STG 4 Pain reduced to mild vs mod/severe  -     STG 4 Progress Met  -     STG 5 Decrease spasm to L side paraspinals in lumbar region to allow palpation without withdrawl.   -     STG 5 Progress Met  -        Long Term Goals    LTG Date to Achieve 08/10/18  -     LTG 1 Subjectively improve 75% overall   -     LTG 1 Progress Progressing  -     LTG 2 Patient to be able to complete  activities without increased pain or restrictions limiting function   -     LTG 2 Progress Progressing  -     LTG 3 Patient to be able to position for rest to allow at least 5-6 hours of consistent sleep without awakening due to pain   -     LTG 3 Progress Progressing  -        Time Calculation    PT Goal Re-Cert Due Date 07/20/18  -       User Key  (r) = Recorded By, (t) = Taken By, (c) = Cosigned By    Initials Name Provider Type    KARIE Perez, PT Physical Therapist           Therapy Education  Given: HEP  Program: Reinforced  How Provided: Verbal, Demonstration  Provided to: Patient  Level of Understanding: Teach back education performed, Verbalized, Demonstrated              Time Calculation:   Start Time: 0918  Stop Time: 1015  Time Calculation (min): 57 min  Therapy Suggested Charges     Code   Minutes Charges    43173 (CPT®) Hc Pt Neuromusc Re Education Ea 15 Min      45667 (CPT®) Hc Pt Ther Proc Ea 15 Min      86934 (CPT®) Hc Gait Training Ea 15 Min      25330 (CPT®) Hc Pt Therapeutic Act Ea 15 Min      88830 (CPT®) Hc Pt Manual Therapy Ea 15 Min 34 2    32567 (CPT®) Hc Pt Ther Massage- Per 15 Min      90629 (CPT®) Hc Pt Iontophoresis Ea 15 Min      58667 (CPT®) Hc Pt Elec Stim Ea-Per 15 Min      77026 (CPT®) Hc Pt Ultrasound Ea 15 Min      49269 (CPT®) Hc Pt Self Care/Mgmt/Train Ea 15 Min      Total  34 2        Therapy Charges for Today     Code Description Service Date Service Provider Modifiers Qty    61312614520 HC PT THER PROC  EA 15 MIN 6/20/2018 Pat Perez, PT GP 2    [id number]  PT MANUAL THERAPY EA 15 MIN 6/20/2018 Pat Perez, PT GP 2                    Pat Perez, PT  6/20/2018

## 2018-06-20 NOTE — PROGRESS NOTES
CC: ROXANE visit, history reviewed, no changes noted    ROS:Positive No complaints   Negative leaking fluid from the vagina, swelling in her legs, headache, visual changes, low back pain and heartburn      Educated on:FKC, VB, PTL,preeclampsia warning signs    A/Plan: f/u in 2 week/s   Angélica was seen today for routine prenatal visit.    Diagnoses and all orders for this visit:    28 weeks gestation of pregnancy    Encounter for supervision of normal pregnancy in multigravida

## 2018-07-05 ENCOUNTER — HOSPITAL ENCOUNTER (OUTPATIENT)
Dept: PHYSICAL THERAPY | Facility: HOSPITAL | Age: 28
Setting detail: THERAPIES SERIES
Discharge: HOME | End: 2018-07-05

## 2018-07-05 ENCOUNTER — ROUTINE PRENATAL (OUTPATIENT)
Dept: OBSTETRICS AND GYNECOLOGY | Facility: CLINIC | Age: 28
End: 2018-07-05

## 2018-07-05 VITALS — DIASTOLIC BLOOD PRESSURE: 72 MMHG | WEIGHT: 195 LBS | BODY MASS INDEX: 28.8 KG/M2 | SYSTOLIC BLOOD PRESSURE: 124 MMHG

## 2018-07-05 DIAGNOSIS — M54.9 BACK PAIN AFFECTING PREGNANCY IN SECOND TRIMESTER: ICD-10-CM

## 2018-07-05 DIAGNOSIS — O99.891 BACK PAIN AFFECTING PREGNANCY IN SECOND TRIMESTER: ICD-10-CM

## 2018-07-05 DIAGNOSIS — Z3A.30 30 WEEKS GESTATION OF PREGNANCY: Primary | ICD-10-CM

## 2018-07-05 DIAGNOSIS — Z34.83 ENCOUNTER FOR SUPERVISION OF NORMAL PREGNANCY IN MULTIGRAVIDA IN THIRD TRIMESTER: ICD-10-CM

## 2018-07-05 DIAGNOSIS — M53.3 COCCYX PAIN: Primary | ICD-10-CM

## 2018-07-05 PROCEDURE — 97140 MANUAL THERAPY 1/> REGIONS: CPT | Performed by: PHYSICAL THERAPIST

## 2018-07-05 PROCEDURE — 0502F SUBSEQUENT PRENATAL CARE: CPT | Performed by: ADVANCED PRACTICE MIDWIFE

## 2018-07-05 NOTE — THERAPY TREATMENT NOTE
Outpatient Physical Therapy Women's Health Treatment Note  [facility]     Patient Name: Angélica Hooper  : 1990  MRN: [id number]  Today's Date: 2018        Visit Date: 2018  Visit Number:   Recheck: 18  RTD: today  Subjective Improvement: 70-75%    Visit Dx:    ICD-10-CM ICD-9-CM   1. Coccyx pain M53.3 724.79   2. Back pain affecting pregnancy in second trimester O26.892 646.83    M54.9 724.5       There is no problem list on file for this patient.             Women's Health     Row Name 18 1400             Pregnancy Questions    Number of Pregnancies 3  -SW      Number of Children 1  -SW      How many weeks pregnant are you? 30 weeks  -SW      Type of Previous Deliveries Vaginal  -SW      Due Date 18  -SW         Subjective Pain    Able to rate subjective pain? yes  -SW        User Key  (r) = Recorded By, (t) = Taken By, (c) = Cosigned By    Initials Name Provider Type    SW Pat Perez, PT Physical Therapist              PT Ortho     Row Name 18 1400       Subjective Comments    Subjective Comments The only days I hurt was with sitting on bleachers.  Pain goes away with movement and rest.  31 weeks on Saturday.  Sleeping good overall.  Believes she is good and can manage on her own probably.  A little more sore across sacrum but overall good.  Patient reports feeling well enough to complete deep cleaning at her home.  Wiping down walls and base-boards etc.  No limitations in activities at home or with childcare.  70-75% better overall.  MD appointment as soon as PT completed.   -SW       Subjective Pain    Pre-Treatment Pain Level 0  -SW    Post-Treatment Pain Level 0  -SW       Posture/Observations    Posture- WNL Posture is WNL  -SW    Posture/Observations Comments Gait unremarkable.  Equal step and stride noted bilaterally.  Transfers without difficulty or guarding.   -SW       Quarter Clearing    Quarter Clearing Lower Quarter Clearing  -SW        DTR- Lower Quarter Clearing    Patellar tendon (L2-4) 2- Normal response  -SW    Achilles tendon (S1-2) 2- Normal response  -SW       Neural Tension Signs- Lower Quarter Clearing    Slump Negative  -SW    SLR Negative  -SW       Sensory Screen for Light Touch- Lower Quarter Clearing    L1 (inguinal area) Intact  -SW    L2 (anterior mid thigh) Intact  -SW    L3 (distal anterior thigh) Intact  -SW    L4 (medial lower leg/foot) Intact  -SW    L5 (lateral lower leg/great toe) Intact  -SW    S1 (bottom of foot) Intact  -SW       Myotomal Screen- Lower Quarter Clearing    Hip flexion (L2) WNL  -SW    Knee extension (L3) WNL  -SW    Ankle DF (L4) WNL  -SW    Great toe extension (L5) WNL  -SW    Ankle PF (S1) WNL  -SW    Knee flexion (S2) WNL  -SW       Lumbar ROM Screen- Lower Quarter Clearing    Lumbar Flexion Normal  -SW    Lumbar Extension Normal  -SW    Lumbar Lateral Flexion Normal  -SW    Lumbar Rotation Normal  -SW       SI/Hip Screen- Lower Quarter Clearing    ASIS compression Negative  -SW    ASIS distraction Negative  -SW    Mary's/Oswaldo's test Negative  -SW    Posterior thigh sheer Negative  -SW       Special Tests/Palpation    Special Tests/Palpation Lumbar/SI  -SW       Lumbosacral Palpation    SI --   aligned this visit; B tender with palaption ttp  -SW    Lumbosacral Segment Bilateral:;Tender  -SW    Piriformis --  -SW    Gluteus Wilfrido --  -SW    Erector Spinae (Paraspinals) Bilateral:;Tender  -SW    Lumbosacral Palpation? Yes  -SW       Lumbosacral Accessory Motions    Lumbosacral Accessory Motions Tested? Yes  -SW    PA Glide- L1 WNL  -SW    PA Dillon- L2 WNL  -SW    PA glide- Sacral base --   aligned this date.   -SW    Innominate rotation --   aligned this date.   -SW       Lumbar/SI Special Tests    SLR (Neural Tension) Negative  -SW    SI Compression Test (SI Dysfunction) Negative  -SW    SI Distraction Test (SI Dysfunction) Negative  -SW    MARY (hip vs. SI Dysfunction) Negative  -SW      User  Key  (r) = Recorded By, (t) = Taken By, (c) = Cosigned By    Initials Name Provider Type    KARIE Perez PT Physical Therapist                           PT Assessment/Plan     Row Name 07/05/18 1400          PT Assessment    Functional Limitations Performance in leisure activities;Performance in self-care ADL;Performance in work activities;Other (comment)   caregiving  -     Impairments Impaired postural alignment;Joint mobility;Muscle strength;Pain;Poor body mechanics;Posture  -     Assessment Comments Patient remains grossly tender across B SI joint and sacrum.  However, pain is down and alignment level bilaterally.  Patient is managing well.  Thickness and tension noted bilateral LB but otherwise unremarkable presentation.  Provacative tests negative this date. Patient feels she can manage on her own and desires to attempt at this time.  Will contact PT should s/s return. All goals met this visit.   -     Rehab Potential Good  -     Patient/caregiver participated in establishment of treatment plan and goals Yes  -     Patient would benefit from skilled therapy intervention Yes  -        PT Plan    PT Frequency --   no schedule; patient desires I management attempt  -     PT Plan Comments continue with independent management at this time.  Should s[facility]s return, [facility]  Otherwise, if no return, consider this her DC from therapy.  -       User Key  (r) = Recorded By, (t) = Taken By, (c) = Cosigned By    Initials Name Provider Type    KARIE Perez, PT Physical Therapist                      Exercises     Row Name 07/05/18 1400 07/05/18 1300          Subjective Comments    Subjective Comments The only days I hurt was with sitting on bleachers.  Pain goes away with movement and rest.  31 weeks on Saturday.  Sleeping good overall.  Believes she is good and can manage on her own probably.  A little more sore across sacrum but  overall good.  Patient reports feeling well enough to complete deep cleaning at her home.  Wiping down walls and base-boards etc.  No limitations in activities at home or with childcare.  70-75% better overall.  MD appointment as soon as PT completed.   -SW  --        Subjective Pain    Able to rate subjective pain? yes  -SW  --     Pre-Treatment Pain Level 0  -SW  --     Post-Treatment Pain Level 0  -SW  --        Total Minutes    52182 - PT Manual Therapy Minutes  -- 30  -SW       User Key  (r) = Recorded By, (t) = Taken By, (c) = Cosigned By    Initials Name Provider Type     Pat Perez, PT Physical Therapist           Manual Rx (last 36 hours)      Manual Treatments     Row Name 07/05/18 1300             Total Minutes    32946 - PT Manual Therapy Minutes 30  -SW         Manual Rx 1    Manual Rx 1 Location alignment check supine and prone  -         Manual Rx 2    Manual Rx 2 Location Lumbosacral release bilaterally  -      Manual Rx 2 Type MFR, cupping, sacral float  -SW        User Key  (r) = Recorded By, (t) = Taken By, (c) = Cosigned By    Initials Name Provider Type     Pat Perez, PT Physical Therapist                          PT OP Goals     Row Name 07/05/18 1453 07/05/18 1400       PT Short Term Goals    STG 1  -- independent HEP for stretching and stability   -    STG 1 Progress  -- Met  -    STG 2  -- alignment level bilaterally without need for MET to correct  -    STG 2 Progress  -- Met  -    STG 3  -- Pain reduced such that she is able to mobilize in/out of bed without assist  -    STG 3 Progress  -- Met  -    STG 4  -- Pain reduced to mild vs mod/severe  -    STG 4 Progress  -- Met  -    STG 5  -- Decrease spasm to L side paraspinals in lumbar region to allow palpation without withdrawl.   -    STG 5 Progress  -- Met  -       Long Term Goals    LTG Date to Achieve  -- 08/10/18  -    LTG 1  -- Subjectively improve 75% overall   -    LTG 1 Progress  --  Met  -SW    LTG 2  -- Patient to be able to complete  activities without increased pain or restrictions limiting function   -    LTG 2 Progress  -- Met  -SW    LTG 3  -- Patient to be able to position for rest to allow at least 5-6 hours of consistent sleep without awakening due to pain   -    LTG 3 Progress  -- Met  -SW       Time Calculation    PT Goal Re-Cert Due Date 07/20/18  -SW  --      User Key  (r) = Recorded By, (t) = Taken By, (c) = Cosigned By    Initials Name Provider Type    KARIE Perez, PT Physical Therapist           Therapy Education  Given: HEP  Program: Reinforced  How Provided: Verbal  Provided to: Patient  Level of Understanding: Teach back education performed, Verbalized              Time Calculation:   Start Time: 1405  Stop Time: 1440  Time Calculation (min): 35 min  Therapy Suggested Charges     Code   Minutes Charges    97282 (CPT®) Hc Pt Neuromusc Re Education Ea 15 Min      46586 (CPT®) Hc Pt Ther Proc Ea 15 Min      42565 (CPT®) Hc Gait Training Ea 15 Min      31751 (CPT®) Hc Pt Therapeutic Act Ea 15 Min      76023 (CPT®) Hc Pt Manual Therapy Ea 15 Min 30 2    34981 (CPT®) Hc Pt Ther Massage- Per 15 Min      85027 (CPT®) Hc Pt Iontophoresis Ea 15 Min      12724 (CPT®) Hc Pt Elec Stim Ea-Per 15 Min      44531 (CPT®) Hc Pt Ultrasound Ea 15 Min      10197 (CPT®) Hc Pt Self Care/Mgmt/Train Ea 15 Min      Total  30 2        Therapy Charges for Today     Code Description Service Date Service Provider Modifiers Qty    44271440025 HC PT MANUAL THERAPY EA 15 MIN 7/5/2018 Pat Perez, PT GP 2                    Pat Perez, PT  7/5/2018

## 2018-07-10 NOTE — PROGRESS NOTES
CC: ROXANE visit, history reviewed, no changes noted    ROS:Positive No complaints   Negative leaking fluid from the vagina, swelling in her legs, headache, visual changes, low back pain and heartburn      Educated on:Normal lab results, FKC, VB, PTL    A/Plan: f/u in 2 week/s   Diagnoses and all orders for this visit:    30 weeks gestation of pregnancy    Encounter for supervision of normal pregnancy in multigravida in third trimester

## 2018-07-20 ENCOUNTER — ROUTINE PRENATAL (OUTPATIENT)
Dept: OBSTETRICS AND GYNECOLOGY | Facility: CLINIC | Age: 28
End: 2018-07-20

## 2018-07-20 VITALS — SYSTOLIC BLOOD PRESSURE: 92 MMHG | WEIGHT: 198 LBS | DIASTOLIC BLOOD PRESSURE: 62 MMHG | BODY MASS INDEX: 29.24 KG/M2

## 2018-07-20 DIAGNOSIS — Z34.83 ENCOUNTER FOR SUPERVISION OF NORMAL PREGNANCY IN MULTIGRAVIDA IN THIRD TRIMESTER: ICD-10-CM

## 2018-07-20 DIAGNOSIS — M53.3 TAIL BONE PAIN: ICD-10-CM

## 2018-07-20 DIAGNOSIS — Z3A.32 32 WEEKS GESTATION OF PREGNANCY: Primary | ICD-10-CM

## 2018-07-20 PROCEDURE — 0502F SUBSEQUENT PRENATAL CARE: CPT | Performed by: ADVANCED PRACTICE MIDWIFE

## 2018-07-20 NOTE — PROGRESS NOTES
CC: ROXANE visit, history reviewed, no changes noted    ROS:Positive No complaints   Negative leaking fluid from the vagina, swelling in her legs, headache, visual changes, low back pain and heartburn      Educated on:FKC, PTL    A/Plan: f/u in 2 week/s   Diagnoses and all orders for this visit:    32 weeks gestation of pregnancy    Encounter for supervision of normal pregnancy in multigravida in third trimester    Tail bone pain

## 2018-08-01 ENCOUNTER — ROUTINE PRENATAL (OUTPATIENT)
Dept: OBSTETRICS AND GYNECOLOGY | Facility: CLINIC | Age: 28
End: 2018-08-01

## 2018-08-01 VITALS — SYSTOLIC BLOOD PRESSURE: 114 MMHG | DIASTOLIC BLOOD PRESSURE: 78 MMHG | BODY MASS INDEX: 29.83 KG/M2 | WEIGHT: 202 LBS

## 2018-08-01 DIAGNOSIS — Z3A.34 34 WEEKS GESTATION OF PREGNANCY: Primary | ICD-10-CM

## 2018-08-01 DIAGNOSIS — Z34.83 ENCOUNTER FOR SUPERVISION OF NORMAL PREGNANCY IN MULTIGRAVIDA IN THIRD TRIMESTER: ICD-10-CM

## 2018-08-01 DIAGNOSIS — O26.843 UTERINE SIZE DATE DISCREPANCY PREGNANCY, THIRD TRIMESTER: ICD-10-CM

## 2018-08-01 PROCEDURE — 0502F SUBSEQUENT PRENATAL CARE: CPT | Performed by: ADVANCED PRACTICE MIDWIFE

## 2018-08-07 ENCOUNTER — ROUTINE PRENATAL (OUTPATIENT)
Dept: OBSTETRICS AND GYNECOLOGY | Facility: CLINIC | Age: 28
End: 2018-08-07

## 2018-08-07 VITALS — WEIGHT: 205 LBS | SYSTOLIC BLOOD PRESSURE: 121 MMHG | DIASTOLIC BLOOD PRESSURE: 79 MMHG | BODY MASS INDEX: 30.27 KG/M2

## 2018-08-07 DIAGNOSIS — Z34.83 ENCOUNTER FOR SUPERVISION OF NORMAL PREGNANCY IN MULTIGRAVIDA IN THIRD TRIMESTER: ICD-10-CM

## 2018-08-07 DIAGNOSIS — Z3A.35 35 WEEKS GESTATION OF PREGNANCY: ICD-10-CM

## 2018-08-07 DIAGNOSIS — Z36.85 ANTENATAL SCREENING FOR STREPTOCOCCUS B: Primary | ICD-10-CM

## 2018-08-07 PROCEDURE — 0502F SUBSEQUENT PRENATAL CARE: CPT | Performed by: NURSE PRACTITIONER

## 2018-08-07 PROCEDURE — 87653 STREP B DNA AMP PROBE: CPT | Performed by: NURSE PRACTITIONER

## 2018-08-08 LAB — GROUP B STREP, DNA: NEGATIVE

## 2018-08-15 ENCOUNTER — ROUTINE PRENATAL (OUTPATIENT)
Dept: OBSTETRICS AND GYNECOLOGY | Facility: CLINIC | Age: 28
End: 2018-08-15

## 2018-08-15 VITALS — DIASTOLIC BLOOD PRESSURE: 83 MMHG | WEIGHT: 207 LBS | SYSTOLIC BLOOD PRESSURE: 124 MMHG | BODY MASS INDEX: 30.57 KG/M2

## 2018-08-15 DIAGNOSIS — O26.843 UTERINE SIZE DATE DISCREPANCY PREGNANCY, THIRD TRIMESTER: ICD-10-CM

## 2018-08-15 DIAGNOSIS — Z3A.36 36 WEEKS GESTATION OF PREGNANCY: Primary | ICD-10-CM

## 2018-08-15 PROCEDURE — 0502F SUBSEQUENT PRENATAL CARE: CPT | Performed by: NURSE PRACTITIONER

## 2018-08-22 ENCOUNTER — ROUTINE PRENATAL (OUTPATIENT)
Dept: OBSTETRICS AND GYNECOLOGY | Facility: CLINIC | Age: 28
End: 2018-08-22

## 2018-08-22 VITALS — WEIGHT: 205 LBS | BODY MASS INDEX: 30.27 KG/M2 | SYSTOLIC BLOOD PRESSURE: 118 MMHG | DIASTOLIC BLOOD PRESSURE: 86 MMHG

## 2018-08-22 DIAGNOSIS — Z3A.37 37 WEEKS GESTATION OF PREGNANCY: Primary | ICD-10-CM

## 2018-08-22 DIAGNOSIS — Z34.83 ENCOUNTER FOR SUPERVISION OF NORMAL PREGNANCY IN MULTIGRAVIDA IN THIRD TRIMESTER: ICD-10-CM

## 2018-08-22 DIAGNOSIS — O26.843 UTERINE SIZE DATE DISCREPANCY PREGNANCY, THIRD TRIMESTER: ICD-10-CM

## 2018-08-22 PROCEDURE — 0502F SUBSEQUENT PRENATAL CARE: CPT | Performed by: NURSE PRACTITIONER

## 2018-08-29 ENCOUNTER — ROUTINE PRENATAL (OUTPATIENT)
Dept: OBSTETRICS AND GYNECOLOGY | Facility: CLINIC | Age: 28
End: 2018-08-29

## 2018-08-29 VITALS — SYSTOLIC BLOOD PRESSURE: 119 MMHG | WEIGHT: 204 LBS | DIASTOLIC BLOOD PRESSURE: 79 MMHG | BODY MASS INDEX: 30.13 KG/M2

## 2018-08-29 DIAGNOSIS — Z34.83 ENCOUNTER FOR SUPERVISION OF NORMAL PREGNANCY IN MULTIGRAVIDA IN THIRD TRIMESTER: ICD-10-CM

## 2018-08-29 DIAGNOSIS — Z3A.38 38 WEEKS GESTATION OF PREGNANCY: Primary | ICD-10-CM

## 2018-08-29 PROCEDURE — 0502F SUBSEQUENT PRENATAL CARE: CPT | Performed by: NURSE PRACTITIONER

## 2018-08-30 ENCOUNTER — HOSPITAL ENCOUNTER (INPATIENT)
Facility: HOSPITAL | Age: 28
LOS: 3 days | Discharge: HOME | End: 2018-09-02
Attending: OBSTETRICS & GYNECOLOGY | Admitting: OBSTETRICS & GYNECOLOGY

## 2018-08-30 PROBLEM — Z37.9 NORMAL LABOR: Status: ACTIVE | Noted: 2018-08-30

## 2018-08-30 PROBLEM — O42.00: Status: ACTIVE | Noted: 2018-08-30

## 2018-08-30 PROCEDURE — 85027 COMPLETE CBC AUTOMATED: CPT | Performed by: OBSTETRICS & GYNECOLOGY

## 2018-08-30 PROCEDURE — 86900 BLOOD TYPING SEROLOGIC ABO: CPT | Performed by: OBSTETRICS & GYNECOLOGY

## 2018-08-30 PROCEDURE — 80307 DRUG TEST PRSMV CHEM ANLYZR: CPT | Performed by: OBSTETRICS & GYNECOLOGY

## 2018-08-30 PROCEDURE — 86901 BLOOD TYPING SEROLOGIC RH(D): CPT | Performed by: OBSTETRICS & GYNECOLOGY

## 2018-08-30 PROCEDURE — 86850 RBC ANTIBODY SCREEN: CPT | Performed by: OBSTETRICS & GYNECOLOGY

## 2018-08-30 RX ORDER — SODIUM CHLORIDE, SODIUM LACTATE, POTASSIUM CHLORIDE, CALCIUM CHLORIDE 600; 310; 30; 20 MG/100ML; MG/100ML; MG/100ML; MG/100ML
INJECTION, SOLUTION INTRAVENOUS
Status: COMPLETED
Start: 2018-08-30 | End: 2018-08-30

## 2018-08-30 RX ORDER — SODIUM CHLORIDE, SODIUM LACTATE, POTASSIUM CHLORIDE, CALCIUM CHLORIDE 600; 310; 30; 20 MG/100ML; MG/100ML; MG/100ML; MG/100ML
125 INJECTION, SOLUTION INTRAVENOUS CONTINUOUS
Status: DISCONTINUED | OUTPATIENT
Start: 2018-08-31 | End: 2018-08-31

## 2018-08-30 RX ORDER — SODIUM CHLORIDE 0.9 % (FLUSH) 0.9 %
1-10 SYRINGE (ML) INJECTION AS NEEDED
Status: DISCONTINUED | OUTPATIENT
Start: 2018-08-30 | End: 2018-08-31 | Stop reason: HOSPADM

## 2018-08-30 RX ORDER — LIDOCAINE HYDROCHLORIDE 10 MG/ML
5 INJECTION, SOLUTION EPIDURAL; INFILTRATION; INTRACAUDAL; PERINEURAL AS NEEDED
Status: DISCONTINUED | OUTPATIENT
Start: 2018-08-30 | End: 2018-08-31 | Stop reason: HOSPADM

## 2018-08-30 RX ADMIN — SODIUM CHLORIDE, POTASSIUM CHLORIDE, SODIUM LACTATE AND CALCIUM CHLORIDE 125 ML/HR: 600; 310; 30; 20 INJECTION, SOLUTION INTRAVENOUS at 23:43

## 2018-08-30 RX ADMIN — SODIUM CHLORIDE, SODIUM LACTATE, POTASSIUM CHLORIDE, CALCIUM CHLORIDE 125 ML/HR: 600; 310; 30; 20 INJECTION, SOLUTION INTRAVENOUS at 23:43

## 2018-08-31 ENCOUNTER — ANESTHESIA (OUTPATIENT)
Dept: LABOR AND DELIVERY | Facility: HOSPITAL | Age: 28
End: 2018-08-31

## 2018-08-31 ENCOUNTER — ANESTHESIA EVENT (OUTPATIENT)
Dept: LABOR AND DELIVERY | Facility: HOSPITAL | Age: 28
End: 2018-08-31

## 2018-08-31 LAB
ABO GROUP BLD: NORMAL
AMPHET+METHAMPHET UR QL: NEGATIVE
BARBITURATES UR QL SCN: NEGATIVE
BENZODIAZ UR QL SCN: NEGATIVE
BLD GP AB SCN SERPL QL: NEGATIVE
CANNABINOIDS SERPL QL: NEGATIVE
COCAINE UR QL: NEGATIVE
DEPRECATED RDW RBC AUTO: 44.5 FL (ref 36.4–46.3)
ERYTHROCYTE [DISTWIDTH] IN BLOOD BY AUTOMATED COUNT: 13.8 % (ref 11.5–14.5)
HCT VFR BLD AUTO: 34.4 % (ref 35–45)
HGB BLD-MCNC: 12 G/DL (ref 12–15.5)
Lab: NORMAL
MCH RBC QN AUTO: 31.1 PG (ref 26.5–34)
MCHC RBC AUTO-ENTMCNC: 34.9 G/DL (ref 31.4–36)
MCV RBC AUTO: 89.1 FL (ref 80–98)
METHADONE UR QL SCN: NEGATIVE
OPIATES UR QL: NEGATIVE
OXYCODONE UR QL SCN: NEGATIVE
PLATELET # BLD AUTO: 185 10*3/MM3 (ref 150–450)
PMV BLD AUTO: 11.1 FL (ref 8–12)
RBC # BLD AUTO: 3.86 10*6/MM3 (ref 3.77–5.16)
RH BLD: POSITIVE
T&S EXPIRATION DATE: NORMAL
WBC NRBC COR # BLD: 8.75 10*3/MM3 (ref 3.2–9.8)

## 2018-08-31 PROCEDURE — C1755 CATHETER, INTRASPINAL: HCPCS | Performed by: NURSE ANESTHETIST, CERTIFIED REGISTERED

## 2018-08-31 PROCEDURE — 59400 OBSTETRICAL CARE: CPT | Performed by: ADVANCED PRACTICE MIDWIFE

## 2018-08-31 PROCEDURE — 51703 INSERT BLADDER CATH COMPLEX: CPT

## 2018-08-31 PROCEDURE — 51701 INSERT BLADDER CATHETER: CPT

## 2018-08-31 PROCEDURE — 0KQM0ZZ REPAIR PERINEUM MUSCLE, OPEN APPROACH: ICD-10-PCS | Performed by: ADVANCED PRACTICE MIDWIFE

## 2018-08-31 PROCEDURE — 25010000002 ONDANSETRON PER 1 MG

## 2018-08-31 PROCEDURE — C1755 CATHETER, INTRASPINAL: HCPCS

## 2018-08-31 RX ORDER — DOCUSATE SODIUM 100 MG/1
100 CAPSULE, LIQUID FILLED ORAL 2 TIMES DAILY PRN
Status: DISCONTINUED | OUTPATIENT
Start: 2018-08-31 | End: 2018-09-02 | Stop reason: HOSPADM

## 2018-08-31 RX ORDER — FAMOTIDINE 10 MG/ML
20 INJECTION, SOLUTION INTRAVENOUS ONCE AS NEEDED
Status: DISCONTINUED | OUTPATIENT
Start: 2018-08-31 | End: 2018-08-31 | Stop reason: HOSPADM

## 2018-08-31 RX ORDER — OXYTOCIN/0.9 % SODIUM CHLORIDE 30/500 ML
2-30 PLASTIC BAG, INJECTION (ML) INTRAVENOUS
Status: DISCONTINUED | OUTPATIENT
Start: 2018-08-31 | End: 2018-08-31

## 2018-08-31 RX ORDER — OXYTOCIN/0.9 % SODIUM CHLORIDE 30/500 ML
999 PLASTIC BAG, INJECTION (ML) INTRAVENOUS AS NEEDED
Status: DISCONTINUED | OUTPATIENT
Start: 2018-08-31 | End: 2018-09-02

## 2018-08-31 RX ORDER — ACETAMINOPHEN 325 MG/1
650 TABLET ORAL EVERY 4 HOURS PRN
Status: DISCONTINUED | OUTPATIENT
Start: 2018-08-31 | End: 2018-09-02 | Stop reason: HOSPADM

## 2018-08-31 RX ORDER — ONDANSETRON 2 MG/ML
4 INJECTION INTRAMUSCULAR; INTRAVENOUS ONCE AS NEEDED
Status: COMPLETED | OUTPATIENT
Start: 2018-08-31 | End: 2018-08-31

## 2018-08-31 RX ORDER — SODIUM CHLORIDE, SODIUM LACTATE, POTASSIUM CHLORIDE, CALCIUM CHLORIDE 600; 310; 30; 20 MG/100ML; MG/100ML; MG/100ML; MG/100ML
INJECTION, SOLUTION INTRAVENOUS
Status: COMPLETED
Start: 2018-08-31 | End: 2018-08-31

## 2018-08-31 RX ORDER — MISOPROSTOL 200 UG/1
800 TABLET ORAL AS NEEDED
Status: DISCONTINUED | OUTPATIENT
Start: 2018-08-31 | End: 2018-08-31 | Stop reason: HOSPADM

## 2018-08-31 RX ORDER — LIDOCAINE HYDROCHLORIDE AND EPINEPHRINE 20; 5 MG/ML; UG/ML
INJECTION, SOLUTION EPIDURAL; INFILTRATION; INTRACAUDAL; PERINEURAL AS NEEDED
Status: DISCONTINUED | OUTPATIENT
Start: 2018-08-31 | End: 2018-08-31 | Stop reason: SURG

## 2018-08-31 RX ORDER — CARBOPROST TROMETHAMINE 250 UG/ML
250 INJECTION, SOLUTION INTRAMUSCULAR AS NEEDED
Status: DISCONTINUED | OUTPATIENT
Start: 2018-08-31 | End: 2018-08-31 | Stop reason: HOSPADM

## 2018-08-31 RX ORDER — EPHEDRINE SULFATE 50 MG/ML
5 INJECTION, SOLUTION INTRAVENOUS
Status: DISCONTINUED | OUTPATIENT
Start: 2018-08-31 | End: 2018-08-31 | Stop reason: HOSPADM

## 2018-08-31 RX ORDER — LANOLIN 100 %
OINTMENT (GRAM) TOPICAL
Status: DISPENSED
Start: 2018-08-31 | End: 2018-09-01

## 2018-08-31 RX ORDER — LIDOCAINE HYDROCHLORIDE 10 MG/ML
INJECTION, SOLUTION INFILTRATION; PERINEURAL
Status: DISPENSED
Start: 2018-08-31 | End: 2018-08-31

## 2018-08-31 RX ORDER — METHYLERGONOVINE MALEATE 0.2 MG/ML
200 INJECTION INTRAVENOUS ONCE AS NEEDED
Status: DISCONTINUED | OUTPATIENT
Start: 2018-08-31 | End: 2018-08-31 | Stop reason: HOSPADM

## 2018-08-31 RX ORDER — ONDANSETRON 2 MG/ML
INJECTION INTRAMUSCULAR; INTRAVENOUS
Status: COMPLETED
Start: 2018-08-31 | End: 2018-08-31

## 2018-08-31 RX ORDER — IBUPROFEN 800 MG/1
800 TABLET ORAL EVERY 6 HOURS PRN
Status: DISCONTINUED | OUTPATIENT
Start: 2018-08-31 | End: 2018-09-02 | Stop reason: HOSPADM

## 2018-08-31 RX ORDER — SODIUM CHLORIDE 0.9 % (FLUSH) 0.9 %
1-10 SYRINGE (ML) INJECTION AS NEEDED
Status: DISCONTINUED | OUTPATIENT
Start: 2018-08-31 | End: 2018-09-02 | Stop reason: HOSPADM

## 2018-08-31 RX ORDER — DIPHENHYDRAMINE HYDROCHLORIDE 50 MG/ML
12.5 INJECTION INTRAMUSCULAR; INTRAVENOUS EVERY 8 HOURS PRN
Status: DISCONTINUED | OUTPATIENT
Start: 2018-08-31 | End: 2018-08-31 | Stop reason: HOSPADM

## 2018-08-31 RX ADMIN — LIDOCAINE HYDROCHLORIDE,EPINEPHRINE BITARTRATE 3 ML: 20; .005 INJECTION, SOLUTION EPIDURAL; INFILTRATION; INTRACAUDAL; PERINEURAL at 03:24

## 2018-08-31 RX ADMIN — OXYTOCIN-SODIUM CHLORIDE 0.9% IV SOLN 30 UNIT/500ML 2 MILLI-UNITS/MIN: 30-0.9/5 SOLUTION at 05:23

## 2018-08-31 RX ADMIN — ONDANSETRON 4 MG: 2 SOLUTION INTRAMUSCULAR; INTRAVENOUS at 06:19

## 2018-08-31 RX ADMIN — SODIUM CHLORIDE, POTASSIUM CHLORIDE, SODIUM LACTATE AND CALCIUM CHLORIDE 1000 ML: 600; 310; 30; 20 INJECTION, SOLUTION INTRAVENOUS at 02:53

## 2018-08-31 RX ADMIN — SODIUM CHLORIDE, POTASSIUM CHLORIDE, SODIUM LACTATE AND CALCIUM CHLORIDE 1000 ML: 600; 310; 30; 20 INJECTION, SOLUTION INTRAVENOUS at 06:08

## 2018-08-31 RX ADMIN — SODIUM CHLORIDE, SODIUM LACTATE, POTASSIUM CHLORIDE, CALCIUM CHLORIDE 1000 ML: 600; 310; 30; 20 INJECTION, SOLUTION INTRAVENOUS at 06:08

## 2018-08-31 RX ADMIN — ONDANSETRON 4 MG: 2 INJECTION INTRAMUSCULAR; INTRAVENOUS at 06:19

## 2018-08-31 RX ADMIN — SODIUM CHLORIDE, SODIUM LACTATE, POTASSIUM CHLORIDE, CALCIUM CHLORIDE 1000 ML: 600; 310; 30; 20 INJECTION, SOLUTION INTRAVENOUS at 02:53

## 2018-08-31 RX ADMIN — IBUPROFEN 800 MG: 800 TABLET ORAL at 21:18

## 2018-08-31 RX ADMIN — Medication 12 ML/HR: at 03:26

## 2018-09-01 LAB
HCT VFR BLD AUTO: 30.6 % (ref 35–45)
HGB BLD-MCNC: 10.7 G/DL (ref 12–15.5)

## 2018-09-01 PROCEDURE — 85018 HEMOGLOBIN: CPT | Performed by: ADVANCED PRACTICE MIDWIFE

## 2018-09-01 PROCEDURE — 85014 HEMATOCRIT: CPT | Performed by: ADVANCED PRACTICE MIDWIFE

## 2018-09-01 RX ADMIN — IBUPROFEN 800 MG: 800 TABLET ORAL at 21:01

## 2018-09-01 RX ADMIN — BENZOCAINE AND LEVOMENTHOL: 200; 5 SPRAY TOPICAL at 11:22

## 2018-09-01 RX ADMIN — DOCUSATE SODIUM 100 MG: 100 CAPSULE, LIQUID FILLED ORAL at 22:24

## 2018-09-01 RX ADMIN — IBUPROFEN 800 MG: 800 TABLET ORAL at 11:21

## 2018-09-01 NOTE — PLAN OF CARE
Problem: Patient Care Overview  Goal: Plan of Care Review  Outcome: Ongoing (interventions implemented as appropriate)   09/01/18 9260   Coping/Psychosocial   Plan of Care Reviewed With patient   Plan of Care Review   Progress improving   OTHER   Outcome Summary vss, voiding po pain meds      Goal: Individualization and Mutuality  Outcome: Ongoing (interventions implemented as appropriate)    Goal: Discharge Needs Assessment  Outcome: Ongoing (interventions implemented as appropriate)    Goal: Interprofessional Rounds/Family Conf  Outcome: Ongoing (interventions implemented as appropriate)      Problem: Postpartum (Vaginal Delivery) (Adult,Obstetrics,Pediatric)  Goal: Signs and Symptoms of Listed Potential Problems Will be Absent, Minimized or Managed (Postpartum)  Outcome: Ongoing (interventions implemented as appropriate)

## 2018-09-01 NOTE — PROGRESS NOTES
[facility]  Vaginal Delivery Progress Note    Subjective   Postpartum Day 1: Vaginal Delivery    The patient feels well.  Her pain is well controlled with nonsteroidal anti-inflammatory drugs.   She is ambulating well, voiding without difficulty.  Patient describes her bleeding as moderate lochia.    Breastfeeding: infant latching.    Objective     Vital Signs Range for the last 24 hours  Temperature: Temp:  [97.9 °F (36.6 °C)-98.1 °F (36.7 °C)] 97.9 °F (36.6 °C)   Temp Source: Temp src: Oral   BP: BP: (118-128)/(56-80) 118/79   Pulse: Heart Rate:  [86-96] 86   Respirations: Resp:  [16-18] 18   SPO2: SpO2:  [97 %-99 %] 97 %   O2 Amount (l/min):     O2 Devices Device (Oxygen Therapy): room air   Weight:         Physical Exam:  General:  No acute distresss.  Abdomen: Fundus: appropriate, firm, non tender  Extremities: Normal, atraumatic, no cyanosis, and trace edema.       Lab results reviewed:  Yes   Rubella:  No results found for: RUBELLAIGGIN Nurse Transcribed from prenatal record --  No components found for: EXTRUBELQUAL  Rh Status:    RH type   Date Value Ref Range Status   08/30/2018 Positive  Final     Immunizations: There is no immunization history for the selected administration types on file for this patient.    Assessment/Plan     Active Problems:    Normal labor    Fercho ROM, onset labor w/n 24 hr of rupt, unsp weeks of gest      Angélica Hooper is Day 1  post-partum  Vaginal, Spontaneous Delivery    .      Plan:  Continue current care.      Xiomara Gama CNM    This document has been electronically signed by NEO Robles on September 1, 2018 11:58 AM

## 2018-09-02 VITALS
DIASTOLIC BLOOD PRESSURE: 78 MMHG | WEIGHT: 205 LBS | OXYGEN SATURATION: 97 % | TEMPERATURE: 98.3 F | SYSTOLIC BLOOD PRESSURE: 130 MMHG | RESPIRATION RATE: 18 BRPM | BODY MASS INDEX: 29.35 KG/M2 | HEIGHT: 70 IN | HEART RATE: 97 BPM

## 2018-09-02 PROBLEM — O42.00: Status: RESOLVED | Noted: 2018-08-30 | Resolved: 2018-09-02

## 2018-09-02 PROBLEM — Z37.9 NORMAL LABOR: Status: RESOLVED | Noted: 2018-08-30 | Resolved: 2018-09-02

## 2018-09-02 RX ORDER — IBUPROFEN 800 MG/1
800 TABLET ORAL EVERY 8 HOURS PRN
Qty: 20 TABLET | Refills: 0 | Status: SHIPPED | OUTPATIENT
Start: 2018-09-02 | End: 2018-11-05

## 2018-09-02 RX ORDER — OXYTOCIN/RINGER'S LACTATE 20/1000 ML
1000 PLASTIC BAG, INJECTION (ML) INTRAVENOUS CONTINUOUS
Status: DISCONTINUED | OUTPATIENT
Start: 2018-09-02 | End: 2018-09-02 | Stop reason: HOSPADM

## 2018-09-02 RX ADMIN — IBUPROFEN 800 MG: 800 TABLET ORAL at 09:38

## 2018-09-02 NOTE — PROGRESS NOTES
[facility]  Vaginal Delivery Progress Note    Subjective   Postpartum Day 2: Vaginal Delivery    The patient feels well.  Her pain is well controlled with nonsteroidal anti-inflammatory drugs.   She is ambulating well, voiding without difficulty.  Patient describes her bleeding as moderate lochia.    Breastfeeding: infant latching without difficulty.    Objective     Vital Signs Range for the last 24 hours  Temperature: Temp:  [97.5 °F (36.4 °C)-98.4 °F (36.9 °C)] 98.4 °F (36.9 °C)   Temp Source: Temp src: Oral   BP: BP: (115-145)/(64-81) 115/64   Pulse: Heart Rate:  [77-90] 77   Respirations: Resp:  [16-18] 16   SPO2: SpO2:  [97 %] 97 %   O2 Amount (l/min):     O2 Devices Device (Oxygen Therapy): room air   Weight:         Physical Exam:  General:  No acute distresss.  Abdomen: Fundus: appropriate, firm, non tender  Extremities: Normal, atraumatic, no cyanosis, and trace edema.       Lab results reviewed:  Yes   Rubella:  Immune Rh Status:    RH type   Date Value Ref Range Status   2018 Positive  Final     Immunizations: There is no immunization history for the selected administration types on file for this patient.    Assessment/Plan     Active Problems:     (spontaneous vaginal delivery)      Angélica Hooper is Day 2  post-partum  Vaginal, Spontaneous Delivery    .      Plan:  Discharge home with standard precautions and return to clinic in 4-6 weeks.      Xiomara Gama CNM    This document has been electronically signed by NEO Robles on 2018 10:07 AM

## 2018-09-02 NOTE — PLAN OF CARE
Problem: Patient Care Overview  Goal: Plan of Care Review  Outcome: Ongoing (interventions implemented as appropriate)   09/02/18 0634   Coping/Psychosocial   Plan of Care Reviewed With patient;spouse   Plan of Care Review   Progress improving   OTHER   Outcome Summary VSS, voiding, pain well controlled with PO Motrin, Stool softner given last evening.     Goal: Individualization and Mutuality  Outcome: Ongoing (interventions implemented as appropriate)    Goal: Discharge Needs Assessment  Outcome: Ongoing (interventions implemented as appropriate)    Goal: Interprofessional Rounds/Family Conf  Outcome: Ongoing (interventions implemented as appropriate)      Problem: Postpartum (Vaginal Delivery) (Adult,Obstetrics,Pediatric)  Goal: Signs and Symptoms of Listed Potential Problems Will be Absent, Minimized or Managed (Postpartum)  Outcome: Ongoing (interventions implemented as appropriate)      Problem: Breastfeeding (Adult,Obstetrics,Pediatric)  Goal: Signs and Symptoms of Listed Potential Problems Will be Absent, Minimized or Managed (Breastfeeding)  Outcome: Ongoing (interventions implemented as appropriate)

## 2018-09-02 NOTE — DISCHARGE SUMMARY
[facility]  Angélica Hooper  : 1990  MRN: [id number]    Discharge Summary      Date of Admission: 2018   Date of Discharge:    Admission Dx: 1. 38w5d IUP with PROM   Discharge DX: 1.      Hospital Course: Patient is a 28 y.o. now  who presented to labor and delivery at 38+5 weeks gestation for spontaneous rupture of membranes. Pregnancy has been benign. Please see H&P for full details.   She was admitted and progressed in labor. Had a spontaneous vaginal delivery of an 7lb 3oz male, APGARs  8 at one minute and  9 at five minutes. Delivery was uncomplicated. Please see delivery note for full details.  Her postpartum course has been unremarkable  . On POD # 2 she expressed the desire for D/C and is stable to do so. She is ambulating well, voiding without difficulty, and lochia is within normal limits. She is breastfeeding.   Desires oral progesterone-only contraceptive for contraception.   Discharge Instructions:          Discharge Diet:  Include pelvic rest and signs and symptoms to report for fever 100.4 degrees Fahrenheit or greater, heavy bleeding, frequent passage of large clots, foul odor of lochia, pain or swelling of the calf or breast, postpartum depression or other concerns.    Regular               Discharge Medications:    Your medication list      START taking these medications      Instructions Last Dose Given Next Dose Due   ibuprofen 800 MG tablet  Commonly known as:  ADVIL,MOTRIN      Take 1 tablet by mouth Every 8 (Eight) Hours As Needed for Mild Pain  or Moderate Pain .          CONTINUE taking these medications      Instructions Last Dose Given Next Dose Due   prenatal (CLASSIC) vitamin 28-0.8 MG tablet tablet      Take  by mouth Daily.          STOP taking these medications    magnesium oxide 400 (241.3 Mg) MG tablet tablet  Commonly known as:  MAGOX              Where to Get Your Medications      These medications were sent to [facility] [place] [id number]  [place] [id number]    Hours:  Monday through Friday 7:00am to 5:00pm Phone:  [phone] ·   ibuprofen 800 MG tablet        Discharge Disposition: home   Follow-up: Will call the office to schedule f/u for 2 weeks.       Xiomara Gama CNM    This document has been electronically signed by NEO Robles on September 2, 2018 10:14 AM

## 2018-09-04 NOTE — PROGRESS NOTES
CC: ROXANE visit, history reviewed, changes noted, size greater than dates    ROS:Positive no complaints   Negative edema, HA, visual disturbances, back pain      Educated on:GBS next visit, FKC, VB, PTL    A/Plan: f/u in 2 week/s   Angélica was seen today for routine prenatal visit.    Diagnoses and all orders for this visit:    34 weeks gestation of pregnancy    Encounter for supervision of normal pregnancy in multigravida in third trimester    Uterine size date discrepancy pregnancy, third trimester  -     Cancel: US Ob Follow Up Transabdominal Approach; Future

## 2018-09-05 NOTE — PROGRESS NOTES
Chief Complaint   Patient presents with   • Routine Prenatal Visit       The patient complains of the following: No complaints    ROS  Headache: No   Visual changes: No   Swelling in legs: No   Nausea: No   Constipation: No   Diarrhea: No   Contractions: No   Leaking fluid: No   Vaginal bleeding: No   Other:      Specific topics discussed at today's visit: labor signs and symptoms, decreased fetal movement and FKCs  Tests done today: GBS testing  U/S for EFW - wnl 35+1 6lbs  Tests to be done at the next visit: sergo Lindsay was seen today for routine prenatal visit.    Diagnoses and all orders for this visit:     screening for streptococcus B  -     Group B Strep (Molecular) - Swab, Vaginal/Rectum    35 weeks gestation of pregnancy    Encounter for supervision of normal pregnancy in multigravida in third trimester

## 2018-09-13 ENCOUNTER — OFFICE VISIT (OUTPATIENT)
Dept: OBSTETRICS AND GYNECOLOGY | Facility: CLINIC | Age: 28
End: 2018-09-13

## 2018-09-13 VITALS
BODY MASS INDEX: 25.2 KG/M2 | DIASTOLIC BLOOD PRESSURE: 76 MMHG | SYSTOLIC BLOOD PRESSURE: 109 MMHG | WEIGHT: 176 LBS | HEIGHT: 70 IN

## 2018-09-13 PROCEDURE — 0503F POSTPARTUM CARE VISIT: CPT | Performed by: NURSE PRACTITIONER

## 2018-09-16 NOTE — PROGRESS NOTES
Chief Complaint   Patient presents with   • Routine Prenatal Visit       The patient complains of the following: No complaints    ROS  Headache: No   Visual changes: No   Swelling in legs: No   Nausea: No   Constipation: No   Diarrhea: No   Contractions: No   Leaking fluid: No   Vaginal bleeding: No   Other:      Specific topics discussed at today's visit: labor signs and symptoms, decreased fetal movement and FKCs  Tests done today: none  Tests to be done at the next visit: sergo Lindsay was seen today for routine prenatal visit.    Diagnoses and all orders for this visit:    36 weeks gestation of pregnancy    Uterine size date discrepancy pregnancy, third trimester

## 2018-09-23 NOTE — PROGRESS NOTES
Chief Complaint   Patient presents with   • Routine Prenatal Visit       The patient complains of the following: No new complaints    ROS  Headache: No   Visual changes: No   Swelling in legs: No   Nausea: No   Constipation: No   Diarrhea: No   Contractions: occasional mild   Leaking fluid: No   Vaginal bleeding: No   Other:      Specific topics discussed at today's visit: decreased fetal movement, fetal kick counts and labor signs and symptoms  Tests done today: none  Tests to be done at the next visit: sergo Lindsay was seen today for routine prenatal visit.    Diagnoses and all orders for this visit:    37 weeks gestation of pregnancy    Uterine size date discrepancy pregnancy, third trimester    Encounter for supervision of normal pregnancy in multigravida in third trimester

## 2018-10-11 ENCOUNTER — OFFICE VISIT (OUTPATIENT)
Dept: OBSTETRICS AND GYNECOLOGY | Facility: CLINIC | Age: 28
End: 2018-10-11

## 2018-10-11 VITALS
BODY MASS INDEX: 25.71 KG/M2 | DIASTOLIC BLOOD PRESSURE: 80 MMHG | SYSTOLIC BLOOD PRESSURE: 130 MMHG | WEIGHT: 179.6 LBS | HEIGHT: 70 IN

## 2018-10-11 DIAGNOSIS — Z30.09 GENERAL COUNSELING AND ADVICE ON FEMALE CONTRACEPTION: ICD-10-CM

## 2018-10-11 DIAGNOSIS — Z30.011 INITIATION OF ORAL CONTRACEPTION: ICD-10-CM

## 2018-10-11 PROCEDURE — 0503F POSTPARTUM CARE VISIT: CPT | Performed by: ADVANCED PRACTICE MIDWIFE

## 2018-10-11 RX ORDER — LEVONORGESTREL AND ETHINYL ESTRADIOL 0.1-0.02MG
1 KIT ORAL DAILY
Qty: 28 TABLET | Refills: 12 | Status: SHIPPED | OUTPATIENT
Start: 2018-10-11 | End: 2018-11-05 | Stop reason: CLARIF

## 2018-10-14 NOTE — PROGRESS NOTES
"Subjective   Chief Complaint   Patient presents with   • Postpartum Care     Angélica Hooper is a 28 y.o. year old  presenting for a postpartum visit.  She had a vaginal delivery.   Prenatal course was been benign.    Since delivery she has not been sexually active.  She does not have concerns about post-partum blues/depression.   She is bottle feeding    The following portions of the patient's history were reviewed and updated as appropriate:current medications, allergies, past medical history, past social history and past surgical history    Smoking status: Never Smoker                                                              Smokeless tobacco: Never Used                          Review of Systems   Constitutional: Negative.    Respiratory: Negative.    Cardiovascular: Negative.    Gastrointestinal: Negative.    Genitourinary: Negative.    Musculoskeletal: Negative.    Skin: Negative.    Neurological: Negative.    Psychiatric/Behavioral: Negative.              Objective     /80   Ht 177.8 cm (70\")   Wt 81.5 kg (179 lb 9.6 oz)   Breastfeeding? No   BMI 25.77 kg/m²     General:  well developed; well nourished  no acute distress   Abdomen: soft, non-tender; no masses  no umbilical or inginual hernias are present  no hepato-splenomegaly   Pelvis: External genitalia:  normal appearance of the external genitalia including Bartholin's and [place]'s glands.  :  urethral meatus normal;  Vaginal:  normal pink mucosa without prolapse or lesions.        :    Problems Addressed this Visit     None      Visit Diagnoses     Routine postpartum follow-up    -  Primary    General counseling and advice on female contraception        Initiation of oral contraception                Plan   1. Alesse- ACHES discussed  2. RTC in 1 year         This note was electronically signed.    Carolee Orona, APRN  2018    "

## 2018-10-23 NOTE — PROGRESS NOTES
"Subjective   Chief Complaint   Patient presents with   • Postpartum Care     Angélica Hooper is a 28 y.o. year old  presenting to be seen for her postpartum visit.  She had a vaginal delivery.   Prenatal course was been benign.    Since delivery she has not been sexually active.  She does not have concerns about post-partum blues/depression.   She is breast feeding and plans to continues for 12 month(s).    The following portions of the patient's history were reviewed and updated as appropriate:problem list, current medications and allergies    Social History     Social History   • Marital status:      Spouse name: N/A   • Number of children: N/A   • Years of education: N/A     Occupational History   • Not on file.     Social History Main Topics   • Smoking status: Never Smoker   • Smokeless tobacco: Never Used   • Alcohol use No   • Drug use: No   • Sexual activity: Yes     Partners: Male     Birth control/ protection: Pill      Comment: LAST PAP SMEAR 2017     Other Topics Concern   • Not on file     Social History Narrative   • No narrative on file     Review of Systems      Objective   /76   Ht 177.8 cm (70\")   Wt 79.8 kg (176 lb)   BMI 25.25 kg/m²     General:  well developed; well nourished  no acute distress   Abdomen: soft, non-tender; no masses   Pelvis: External genitalia:  normal appearance of the external genitalia including Bartholin's and Hurlock's glands.  Vaginal:  normal pink mucosa without prolapse or lesions.          Assessment   1. Normal 2 week postpartum exam  2. Perineal lac healing well     Plan   1. BC options reviewed and compared today: condoms  2. F/U for 6wk PP    No orders of the defined types were placed in this encounter.         This note was electronically signed.    Margie Elliott CNM  2018  "

## 2018-10-31 ENCOUNTER — TELEPHONE (OUTPATIENT)
Dept: OBSTETRICS AND GYNECOLOGY | Facility: CLINIC | Age: 28
End: 2018-10-31

## 2018-10-31 RX ORDER — NORETHINDRONE ACETATE AND ETHINYL ESTRADIOL 1MG-20(21)
1 KIT ORAL DAILY
Qty: 28 TABLET | Refills: 12 | Status: SHIPPED | OUTPATIENT
Start: 2018-10-31 | End: 2019-10-31

## 2018-10-31 NOTE — TELEPHONE ENCOUNTER
The pt called and c/o elevated blood pressure with current BC.  Carolee Orona changed her medication to a BC with a lower dose of estrogen.  Pt was told to call if she is having problems, and if not, to RTC on Monday for a BP check.  Pt verbalized understanding.

## 2018-11-05 ENCOUNTER — OFFICE VISIT (OUTPATIENT)
Dept: OBSTETRICS AND GYNECOLOGY | Facility: CLINIC | Age: 28
End: 2018-11-05

## 2018-11-05 VITALS
BODY MASS INDEX: 25.77 KG/M2 | DIASTOLIC BLOOD PRESSURE: 78 MMHG | WEIGHT: 180 LBS | HEART RATE: 83 BPM | HEIGHT: 70 IN | SYSTOLIC BLOOD PRESSURE: 116 MMHG

## 2018-11-05 DIAGNOSIS — F41.9 ANXIETY: ICD-10-CM

## 2018-11-05 DIAGNOSIS — F32.0 CURRENT MILD EPISODE OF MAJOR DEPRESSIVE DISORDER WITHOUT PRIOR EPISODE (HCC): ICD-10-CM

## 2018-11-05 DIAGNOSIS — Z01.30 BLOOD PRESSURE CHECK: Primary | ICD-10-CM

## 2018-11-05 PROCEDURE — 99212 OFFICE O/P EST SF 10 MIN: CPT | Performed by: ADVANCED PRACTICE MIDWIFE

## 2018-11-05 NOTE — PROGRESS NOTES
"Subjective   Chief Complaint   Patient presents with   • Follow-up     recheck on blood pressure      Angélica Hooper is a 28 y.o. year old  who comes to review her recent testing and discuss next steps.      Review of Systems   Constitutional: Negative.    Respiratory: Negative.    Cardiovascular: Negative.  Negative for chest pain and palpitations.   Gastrointestinal: Negative.    Endocrine: Negative.    Genitourinary: Negative.    Musculoskeletal: Negative.  Negative for neck pain.   Skin: Negative.    Neurological: Positive for headaches.   Psychiatric/Behavioral: Positive for agitation and dysphoric mood.        States has been irritable & tearfull since the birth of baby 9 weeks ago.          Objective   /78   Pulse 83   Ht 177.8 cm (70\")   Wt 81.6 kg (180 lb)   LMP 10/20/2018 (Approximate)   Breastfeeding? No   BMI 25.83 kg/m²           Lab Review   No data reviewed      Imaging   No data reviewed           Assessment   1. Normal BP today & at home since change in OCs  2. Depression/anxiety since birth of infant     Plan   1. Zoloft 50 mg daily  2. Will call office in 4 weeks with an update on mood  3. F/U PRN         This note was electronically signed.    Answers for HPI/ROS submitted by the patient on 2018   Hypertension  Chronicity: new  Onset: yesterday  Progression since onset: unchanged  Condition status: controlled  anxiety: Yes  blurred vision: No  malaise/fatigue: No  orthopnea: No  peripheral edema: No  PND: No  sweats: No  Agents associated with hypertension: oral contraceptives  CAD risks: family history  Compliance problems: no compliance problems    "

## 2019-05-16 ENCOUNTER — LAB (OUTPATIENT)
Dept: LAB | Facility: HOSPITAL | Age: 29
End: 2019-05-16

## 2019-05-16 DIAGNOSIS — R00.2 PALPITATIONS: ICD-10-CM

## 2019-05-16 DIAGNOSIS — R00.2 PALPITATIONS: Primary | ICD-10-CM

## 2019-05-16 LAB
ALBUMIN SERPL-MCNC: 4.1 G/DL (ref 3.5–5.2)
ALBUMIN/GLOB SERPL: 1.2 G/DL
ALP SERPL-CCNC: 84 U/L (ref 39–117)
ALT SERPL W P-5'-P-CCNC: 19 U/L (ref 1–33)
ANION GAP SERPL CALCULATED.3IONS-SCNC: 11 MMOL/L
AST SERPL-CCNC: 15 U/L (ref 1–32)
BASOPHILS # BLD AUTO: 0.02 10*3/MM3 (ref 0–0.2)
BASOPHILS NFR BLD AUTO: 0.2 % (ref 0–1.5)
BILIRUB SERPL-MCNC: 0.2 MG/DL (ref 0.2–1.2)
BUN BLD-MCNC: 13 MG/DL (ref 6–20)
BUN/CREAT SERPL: 19.4 (ref 7–25)
CALCIUM SPEC-SCNC: 8.7 MG/DL (ref 8.6–10.5)
CHLORIDE SERPL-SCNC: 102 MMOL/L (ref 98–107)
CO2 SERPL-SCNC: 24 MMOL/L (ref 22–29)
CREAT BLD-MCNC: 0.67 MG/DL (ref 0.57–1)
DEPRECATED RDW RBC AUTO: 40.6 FL (ref 37–54)
EOSINOPHIL # BLD AUTO: 0.07 10*3/MM3 (ref 0–0.4)
EOSINOPHIL NFR BLD AUTO: 0.8 % (ref 0.3–6.2)
ERYTHROCYTE [DISTWIDTH] IN BLOOD BY AUTOMATED COUNT: 12.7 % (ref 12.3–15.4)
GFR SERPL CREATININE-BSD FRML MDRD: 106 ML/MIN/1.73
GLOBULIN UR ELPH-MCNC: 3.5 GM/DL
GLUCOSE BLD-MCNC: 88 MG/DL (ref 65–99)
HCT VFR BLD AUTO: 36.4 % (ref 34–46.6)
HGB BLD-MCNC: 12.4 G/DL (ref 12–15.9)
IMM GRANULOCYTES # BLD AUTO: 0.03 10*3/MM3 (ref 0–0.05)
IMM GRANULOCYTES NFR BLD AUTO: 0.3 % (ref 0–0.5)
LYMPHOCYTES # BLD AUTO: 3.32 10*3/MM3 (ref 0.7–3.1)
LYMPHOCYTES NFR BLD AUTO: 38.4 % (ref 19.6–45.3)
MCH RBC QN AUTO: 30 PG (ref 26.6–33)
MCHC RBC AUTO-ENTMCNC: 34.1 G/DL (ref 31.5–35.7)
MCV RBC AUTO: 87.9 FL (ref 79–97)
MONOCYTES # BLD AUTO: 0.88 10*3/MM3 (ref 0.1–0.9)
MONOCYTES NFR BLD AUTO: 10.2 % (ref 5–12)
NEUTROPHILS # BLD AUTO: 4.33 10*3/MM3 (ref 1.7–7)
NEUTROPHILS NFR BLD AUTO: 50.1 % (ref 42.7–76)
NRBC BLD AUTO-RTO: 0 /100 WBC (ref 0–0.2)
PLATELET # BLD AUTO: 259 10*3/MM3 (ref 140–450)
PMV BLD AUTO: 9.3 FL (ref 6–12)
POTASSIUM BLD-SCNC: 4 MMOL/L (ref 3.5–5.2)
PROT SERPL-MCNC: 7.6 G/DL (ref 6–8.5)
RBC # BLD AUTO: 4.14 10*6/MM3 (ref 3.77–5.28)
SODIUM BLD-SCNC: 137 MMOL/L (ref 136–145)
TSH SERPL DL<=0.05 MIU/L-ACNC: 1.32 MIU/ML (ref 0.27–4.2)
WBC NRBC COR # BLD: 8.65 10*3/MM3 (ref 3.4–10.8)

## 2019-05-16 PROCEDURE — 85025 COMPLETE CBC W/AUTO DIFF WBC: CPT

## 2019-05-16 PROCEDURE — 84443 ASSAY THYROID STIM HORMONE: CPT

## 2019-05-16 PROCEDURE — 80053 COMPREHEN METABOLIC PANEL: CPT

## 2019-05-16 PROCEDURE — 36415 COLL VENOUS BLD VENIPUNCTURE: CPT

## 2020-11-18 ENCOUNTER — LAB (OUTPATIENT)
Dept: LAB | Facility: HOSPITAL | Age: 30
End: 2020-11-18

## 2020-11-18 ENCOUNTER — INITIAL PRENATAL (OUTPATIENT)
Dept: OBSTETRICS AND GYNECOLOGY | Facility: CLINIC | Age: 30
End: 2020-11-18

## 2020-11-18 VITALS — WEIGHT: 184 LBS | SYSTOLIC BLOOD PRESSURE: 120 MMHG | BODY MASS INDEX: 26.4 KG/M2 | DIASTOLIC BLOOD PRESSURE: 72 MMHG

## 2020-11-18 DIAGNOSIS — Z32.00 PREGNANCY EXAMINATION OR TEST, PREGNANCY UNCONFIRMED: ICD-10-CM

## 2020-11-18 DIAGNOSIS — Z34.80 SUPERVISION OF OTHER NORMAL PREGNANCY: Primary | ICD-10-CM

## 2020-11-18 DIAGNOSIS — R00.2 HEART PALPITATIONS: ICD-10-CM

## 2020-11-18 DIAGNOSIS — Z34.81 MULTIGRAVIDA IN FIRST TRIMESTER: Primary | ICD-10-CM

## 2020-11-18 LAB
ABO GROUP BLD: NORMAL
AMPHET+METHAMPHET UR QL: NEGATIVE
AMPHETAMINES UR QL: NEGATIVE
B-HCG UR QL: POSITIVE
BARBITURATES UR QL SCN: NEGATIVE
BASOPHILS # BLD AUTO: 0.02 10*3/MM3 (ref 0–0.2)
BASOPHILS NFR BLD AUTO: 0.2 % (ref 0–1.5)
BENZODIAZ UR QL SCN: NEGATIVE
BILIRUB UR QL STRIP: NEGATIVE
BLD GP AB SCN SERPL QL: NEGATIVE
BUPRENORPHINE SERPL-MCNC: NEGATIVE NG/ML
CANNABINOIDS SERPL QL: NEGATIVE
CLARITY UR: ABNORMAL
COCAINE UR QL: NEGATIVE
COLOR UR: YELLOW
DEPRECATED RDW RBC AUTO: 43.2 FL (ref 37–54)
EOSINOPHIL # BLD AUTO: 0.06 10*3/MM3 (ref 0–0.4)
EOSINOPHIL NFR BLD AUTO: 0.6 % (ref 0.3–6.2)
ERYTHROCYTE [DISTWIDTH] IN BLOOD BY AUTOMATED COUNT: 13.8 % (ref 12.3–15.4)
GLUCOSE UR STRIP-MCNC: NEGATIVE MG/DL
HBV SURFACE AG SERPL QL IA: NORMAL
HCT VFR BLD AUTO: 38.7 % (ref 34–46.6)
HCV AB SER DONR QL: NORMAL
HGB BLD-MCNC: 13.2 G/DL (ref 12–15.9)
HGB UR QL STRIP.AUTO: NEGATIVE
HIV1+2 AB SER QL: NORMAL
IMM GRANULOCYTES # BLD AUTO: 0.04 10*3/MM3 (ref 0–0.05)
IMM GRANULOCYTES NFR BLD AUTO: 0.4 % (ref 0–0.5)
INTERNAL NEGATIVE CONTROL: NEGATIVE
INTERNAL POSITIVE CONTROL: POSITIVE
KETONES UR QL STRIP: NEGATIVE
LEUKOCYTE ESTERASE UR QL STRIP.AUTO: ABNORMAL
LYMPHOCYTES # BLD AUTO: 2.57 10*3/MM3 (ref 0.7–3.1)
LYMPHOCYTES NFR BLD AUTO: 26.3 % (ref 19.6–45.3)
Lab: ABNORMAL
Lab: NORMAL
MCH RBC QN AUTO: 29.9 PG (ref 26.6–33)
MCHC RBC AUTO-ENTMCNC: 34.1 G/DL (ref 31.5–35.7)
MCV RBC AUTO: 87.6 FL (ref 79–97)
METHADONE UR QL SCN: NEGATIVE
MONOCYTES # BLD AUTO: 0.93 10*3/MM3 (ref 0.1–0.9)
MONOCYTES NFR BLD AUTO: 9.5 % (ref 5–12)
NEUTROPHILS NFR BLD AUTO: 6.14 10*3/MM3 (ref 1.7–7)
NEUTROPHILS NFR BLD AUTO: 63 % (ref 42.7–76)
NITRITE UR QL STRIP: NEGATIVE
NRBC BLD AUTO-RTO: 0 /100 WBC (ref 0–0.2)
OPIATES UR QL: NEGATIVE
OXYCODONE UR QL SCN: NEGATIVE
PCP UR QL SCN: NEGATIVE
PH UR STRIP.AUTO: 6.5 [PH] (ref 5–8)
PLATELET # BLD AUTO: 275 10*3/MM3 (ref 140–450)
PMV BLD AUTO: 10 FL (ref 6–12)
PROPOXYPH UR QL: NEGATIVE
PROT UR QL STRIP: NEGATIVE
RBC # BLD AUTO: 4.42 10*6/MM3 (ref 3.77–5.28)
RH BLD: POSITIVE
RPR SER QL: NORMAL
RUBV IGG SERPL IA-ACNC: POSITIVE
SP GR UR STRIP: 1.02 (ref 1–1.03)
TRICYCLICS UR QL SCN: NEGATIVE
UROBILINOGEN UR QL STRIP: ABNORMAL
VZV IGG SER IA-ACNC: POSITIVE
WBC # BLD AUTO: 9.76 10*3/MM3 (ref 3.4–10.8)

## 2020-11-18 PROCEDURE — 0501F PRENATAL FLOW SHEET: CPT | Performed by: NURSE PRACTITIONER

## 2020-11-18 PROCEDURE — 81003 URINALYSIS AUTO W/O SCOPE: CPT | Performed by: NURSE PRACTITIONER

## 2020-11-18 PROCEDURE — 87086 URINE CULTURE/COLONY COUNT: CPT | Performed by: NURSE PRACTITIONER

## 2020-11-18 PROCEDURE — 86787 VARICELLA-ZOSTER ANTIBODY: CPT | Performed by: NURSE PRACTITIONER

## 2020-11-18 PROCEDURE — 87591 N.GONORRHOEAE DNA AMP PROB: CPT | Performed by: NURSE PRACTITIONER

## 2020-11-18 PROCEDURE — 87661 TRICHOMONAS VAGINALIS AMPLIF: CPT | Performed by: NURSE PRACTITIONER

## 2020-11-18 PROCEDURE — 80306 DRUG TEST PRSMV INSTRMNT: CPT | Performed by: NURSE PRACTITIONER

## 2020-11-18 PROCEDURE — 87491 CHLMYD TRACH DNA AMP PROBE: CPT | Performed by: NURSE PRACTITIONER

## 2020-11-18 PROCEDURE — 81025 URINE PREGNANCY TEST: CPT | Performed by: NURSE PRACTITIONER

## 2020-11-18 PROCEDURE — 86803 HEPATITIS C AB TEST: CPT | Performed by: NURSE PRACTITIONER

## 2020-11-18 PROCEDURE — 36415 COLL VENOUS BLD VENIPUNCTURE: CPT

## 2020-11-18 PROCEDURE — 80081 OBSTETRIC PANEL INC HIV TSTG: CPT | Performed by: NURSE PRACTITIONER

## 2020-11-18 RX ORDER — MAGNESIUM OXIDE 400 MG/1
400 TABLET ORAL
COMMUNITY
End: 2022-06-28

## 2020-11-18 NOTE — PROGRESS NOTES
I spent approximately 45 minutes with the patient acquiring the health and history intake and discussing topics related to healthy lifestyle. She is accompanied by her .  Her LMP is 9/13/20. This is her 4th pregnancy. A newob bag is given. We discussed a healthy diet and exercise and what is recommended. I also discussed Listeriosis and Toxoplasmosis. I informed patient not to be in hot tubs, saunas, or tanning beds. We discussed that spotting may occur after intercourse which is common, but if heavy bleeding like a period occurs to [facility] if clinic is closed.  I encouraged her to make an appointment with the dentist if she has not had a dental exam and cleaning in the last 6 months. She said she had a dental appointment about a month ago. The patient denies use of alcohol, illicit drug use, and tobacco smoking. She plans to try to breastfeed again. She says it did not go well last time.  I gave her pamphlet on breastfeeding classes and the breastfeeding mothers support group. These services are provided by Ebony Spears, Lactation Consultant. She filled out the health department referral form and depression screening questionnaire.  I encouraged the patient to get the TDAP vaccine in the 3rd trimester.  I discussed lab tests will be done today. All questions were answered at this time. She is seeing Usha LARSON today.

## 2020-11-19 LAB — BACTERIA SPEC AEROBE CULT: NO GROWTH

## 2020-11-20 PROBLEM — Z34.81 MULTIGRAVIDA IN FIRST TRIMESTER: Status: ACTIVE | Noted: 2020-11-20

## 2020-11-20 PROBLEM — R00.2 HEART PALPITATIONS: Status: ACTIVE | Noted: 2020-11-20

## 2020-11-20 NOTE — ASSESSMENT & PLAN NOTE
Pt reports history of heart palpitations which was attributed to caffeine intake.  She was prescribed magnesium per cardiologist which has helped.

## 2020-11-20 NOTE — PROGRESS NOTES
[facility]  Obstetrics Visit    CHIEF COMPLAINT:  New prenatal visit    HISTORY OF PRESENT ILLNESS:  Angélica Hooper is a 30 y.o. y/o  at Unknown by LMP (Patient's last menstrual period was 2020 (exact date).  This was a planned pregnancy and the patient is supported by her .  Reports occasional nausea.  She denies any vaginal bleeding.  She has started taking a prenatal vitamin.    EPDS: 2    REVIEW OF SYSTEMS  Review of Systems   Constitutional: Negative for activity change, appetite change, chills, diaphoresis, fatigue, fever, unexpected weight gain and unexpected weight loss.   Respiratory: Negative for chest tightness and shortness of breath.    Cardiovascular: Negative for chest pain and palpitations.   Gastrointestinal: Positive for nausea. Negative for abdominal distention, abdominal pain, constipation and diarrhea.   Genitourinary: Negative for amenorrhea, breast discharge, breast lump, breast pain, decreased libido, decreased urine volume, difficulty urinating, dyspareunia, dysuria, flank pain, frequency, genital sores, hematuria, pelvic pain, pelvic pressure, urgency, urinary incontinence, vaginal bleeding, vaginal discharge and vaginal pain.   Musculoskeletal: Positive for back pain. Negative for myalgias.   Skin: Negative for color change, dry skin and skin lesions.   Neurological: Negative for light-headedness and headache.   Psychiatric/Behavioral: Negative for agitation, dysphoric mood, sleep disturbance, suicidal ideas, depressed mood and stress. The patient is not nervous/anxious.        PRENATAL RISK FACTORS   Problems (from 20 to present)     Problem Noted Resolved    Multigravida in first trimester 2020 by Usha Villaseñor, NEO No    Heart palpitations 2020 by Usha Villaseñor, NEO No          DATING CRITERIA:  LMP (2020) -- MICHELLE 2021      OBSTETRIC HISTORY:  OB History    Para Term   AB Living   4 2 2   1 2   SAB TAB Ectopic Molar Multiple Live Births   1         2      # Outcome Date GA Lbr Yoseph/2nd Weight Sex Delivery Anes PTL Lv   4 Current            3 Term 08/31/18 38w6d 11:15 / 00:15 3260 g (7 lb 3 oz) M Vag-Spont EPI N LEOPOLDO   2 SAB 07/2017 5w0d          1 Term 12/30/15 39w6d  3600 g (7 lb 15 oz) M Vag-Spont   LEOPOLDO      Complications: Perineal laceration, second degree     GYN HISTORY:  Denies h/o sexually transmitted infections/pelvic inflammatory disease  Denies h/o abnormal pap smears  Last pap smear:   Last Completed Pap Smear       Status Date      PAP SMEAR Done 2/6/2017 LIQUID-BASED PAP SMEAR, SCREENING        Denies h/o gynecologic surgeries, including biopsies of the cervix    PAST MEDICAL HISTORY:  Past Medical History:   Diagnosis Date   • Clavicle fracture    • Coccyx pain    • Depression    • Encounter for routine postpartum follow-up    • Irregular heartbeat    • Penicillin allergy      PAST SURGICAL HISTORY:  Past Surgical History:   Procedure Laterality Date   • ADENOIDECTOMY     • TONSILLECTOMY     • WISDOM TOOTH EXTRACTION       FAMILY HISTORY:  Family History   Problem Relation Age of Onset   • Hypertension Father    • Idiopathic pulmonary fibrosis Father    • Heart disease Mother    • Hypertension Brother    • Idiopathic pulmonary fibrosis Paternal Grandmother    • Heart attack Maternal Grandmother    • Bone cancer Maternal Grandfather    • Cirrhosis Maternal Grandfather    • No Known Problems Son    • No Known Problems Son      SOCIAL HISTORY:  Social History     Socioeconomic History   • Marital status:      Spouse name: Not on file   • Number of children: Not on file   • Years of education: Not on file   • Highest education level: Not on file   Tobacco Use   • Smoking status: Former Smoker   • Smokeless tobacco: Never Used   Substance and Sexual Activity   • Alcohol use: No   • Drug use: No   • Sexual activity: Yes     Partners: Male     Birth control/protection:  Pill     Comment: LAST PAP SMEAR 2/6/2017 negative     GENETIC SCREENING:  Age >34 yo as of MICHELLE: no  Thalassemia: no  NTD: no  CHD: no  Down Syndrome/MR/Fragile X/Autism: no  Ashkenazi Scientologist with Greg-Sachs, [place], familial dysautonomia: no  Sickle cell disease or trait: no  Hemophilia: no  Muscular dystrophy: no  Cystic fibrosis: no  [place]'s chorea: no  Birth defects: no  Genetic/chromosomal disorders: no    INFECTION HISTORY:  TB exposure: no  HSV: no  Illness since LMP: no  Prior GBS infected child: no  STIs: no    ALLERGIES:  Allergies   Allergen Reactions   • Penicillins Rash     Rash       MEDICATIONS:  Prior to Admission medications    Medication Sig Start Date End Date Taking? Authorizing Provider   magnesium oxide (MAG-OX) 400 MG tablet Take 400 mg by mouth.    ProviderAlina MD   Prenatal Vit-Fe Fumarate-FA (PRENATAL, CLASSIC, VITAMIN) 28-0.8 MG tablet tablet Take  by mouth Daily.    Provider, MD Alina       PHYSICAL EXAM:   LMP 09/13/2020 (Exact Date)   General: Alert, healthy, no distress, well nourished and well developed.  Neurologic: Alert, oriented to person, place, and time.  Gait normal.  Cranial nerves II-XII grossly intact.  HEENT: Normocephalic, atraumatic.  Extraocular muscles intact, pupils equal and reactive x2.    Teeth: Normal hygiene.  Neck: Supple, no adenopathy, thyroid normal size, non-tender, without nodularity, trachea midline.  Breasts: No masses, skin dimpling, skin retraction, nipple discharge, or asymmetry bilaterally.  Lungs: Normal respiratory effort.  Clear to auscultation bilaterally.  No wheezes, rhonci, or rales.  Heart: Regular rate and rhythm.  No murmer, rub or gallop.  Abdomen: Soft, non-tender, non-distended,no masses, no hepatosplenomegaly, no hernia.  Skin: No rash, no lesions.  Extremities: No cyanosis, clubbing or edema.    Bedside ultrasound performed by myself which shows the findings below: +HHUS      IMPRESSION:  Angélica Hooper is a 30  y.o.  at Unknown for a new prenatal visit.    PLAN:  1.  NOB   - Prenatal labs ordered  - Genetic testing, including cystic fibrosis, was discussed and patient declines   - Continue prenatal vitamins  - Weight gain counseling performed.   - Pregravid BMI 25-29.9: Recommend 15-25 lb  - Return to clinic in 4 weeks for return prenatal visit and dating scan   - Reviewed COVID-19 visitation policy  - Reviewed COVID-19 precautions     Diagnosis Plan   1. Multigravida in first trimester     2. Heart palpitations       NEO Cuevas  2020  10:56 CST

## 2020-11-23 LAB
C TRACH RRNA SPEC QL NAA+PROBE: NEGATIVE
N GONORRHOEA RRNA SPEC QL NAA+PROBE: NEGATIVE
T VAGINALIS DNA SPEC QL NAA+PROBE: NEGATIVE

## 2020-12-16 ENCOUNTER — ROUTINE PRENATAL (OUTPATIENT)
Dept: OBSTETRICS AND GYNECOLOGY | Facility: CLINIC | Age: 30
End: 2020-12-16

## 2020-12-16 VITALS — DIASTOLIC BLOOD PRESSURE: 78 MMHG | BODY MASS INDEX: 26.69 KG/M2 | SYSTOLIC BLOOD PRESSURE: 120 MMHG | WEIGHT: 186 LBS

## 2020-12-16 DIAGNOSIS — R00.2 HEART PALPITATIONS: ICD-10-CM

## 2020-12-16 DIAGNOSIS — Z36.87 ENCOUNTER FOR ANTENATAL SCREENING FOR UNCERTAIN DATES: Primary | ICD-10-CM

## 2020-12-16 DIAGNOSIS — Z34.81 ENCOUNTER FOR SUPERVISION OF OTHER NORMAL PREGNANCY IN FIRST TRIMESTER: ICD-10-CM

## 2020-12-16 DIAGNOSIS — Z3A.13 13 WEEKS GESTATION OF PREGNANCY: Primary | ICD-10-CM

## 2020-12-16 PROCEDURE — 0502F SUBSEQUENT PRENATAL CARE: CPT | Performed by: NURSE PRACTITIONER

## 2020-12-16 NOTE — PROGRESS NOTES
CC: Prenatal visit    Angélica Hooper is a 30 y.o.  at 13w3d.  Doing well.  No complaints.  Denies contractions, LOF, or VB.      /78   Wt 84.4 kg (186 lb)   LMP 2020 (Exact Date)   BMI 26.69 kg/m²     Prelim TVUS- single IUP with  bpm, CRL c/w LMP, right ovary not seen, left ovary wnl           Problems (from 20 to present)     Problem Noted Resolved    Multigravida in first trimester 2020 by Usha Villaseñor APRN No    Heart palpitations 2020 by Usha Villaseñor APRN No          A/P: Angélica Hooper is a 30 y.o.  at 13w3d.  - RTC in 4 weeks for ROXANE appt      Diagnosis Plan   1. 13 weeks gestation of pregnancy     2. Encounter for supervision of other normal pregnancy in first trimester     3. Heart palpitations         NEO Cuevas  2020  15:09 CST

## 2021-01-13 ENCOUNTER — ROUTINE PRENATAL (OUTPATIENT)
Dept: OBSTETRICS AND GYNECOLOGY | Facility: CLINIC | Age: 31
End: 2021-01-13

## 2021-01-13 VITALS — SYSTOLIC BLOOD PRESSURE: 126 MMHG | WEIGHT: 190 LBS | DIASTOLIC BLOOD PRESSURE: 80 MMHG | BODY MASS INDEX: 27.26 KG/M2

## 2021-01-13 DIAGNOSIS — Z3A.17 17 WEEKS GESTATION OF PREGNANCY: ICD-10-CM

## 2021-01-13 DIAGNOSIS — R00.2 HEART PALPITATIONS: ICD-10-CM

## 2021-01-13 DIAGNOSIS — Z34.82 ENCOUNTER FOR SUPERVISION OF OTHER NORMAL PREGNANCY IN SECOND TRIMESTER: Primary | ICD-10-CM

## 2021-01-13 PROBLEM — Z34.90 SUPERVISION OF NORMAL PREGNANCY: Status: ACTIVE | Noted: 2020-11-20

## 2021-01-13 PROCEDURE — 0502F SUBSEQUENT PRENATAL CARE: CPT | Performed by: OBSTETRICS & GYNECOLOGY

## 2021-01-13 NOTE — PROGRESS NOTES
CC: Prenatal visit    Angélica Hooper is a 30 y.o.  at 17w3d.  Doing well.  Denies contractions, LOF, or VB.  Reports +FM.    /80   Wt 86.2 kg (190 lb)   LMP 2020 (Exact Date)   BMI 27.26 kg/m²   Fetal Heart Rate: 142     Problems (from 20 to present)     Problem Noted Resolved    Supervision of normal pregnancy 2020 by Usha Villaseñor APRN No    Heart palpitations 2020 by Usha Villaseñor APRN No        A/P: Angélica Hooper is a 30 y.o.  at 17w3d.  - RTC in 4 weeks (per patient request) w/ anatomy US  - Reviewed COVID-19 visitation policy  - Reviewed COVID-19 precautions     Diagnosis Plan   1. Encounter for supervision of other normal pregnancy in second trimester  US Ob 14 + Weeks Single or First Gestation   2. Heart palpitations     3. 17 weeks gestation of pregnancy       Lisa Ramos MD  2021  15:59 CST

## 2021-01-25 ENCOUNTER — TELEPHONE (OUTPATIENT)
Dept: OBSTETRICS AND GYNECOLOGY | Facility: CLINIC | Age: 31
End: 2021-01-25

## 2021-01-25 NOTE — TELEPHONE ENCOUNTER
OB patient called and stated her  has tested positive for COVID and is stating she wanted to know if she should do anything.  Advised patient she should follow quarantine guidelines and practice good hand hygiene, and stay hydrated.   Let her know if she develops symptoms to get tested.   Patient verbalized understanding.

## 2021-02-10 ENCOUNTER — ROUTINE PRENATAL (OUTPATIENT)
Dept: OBSTETRICS AND GYNECOLOGY | Facility: CLINIC | Age: 31
End: 2021-02-10

## 2021-02-10 VITALS — BODY MASS INDEX: 27.69 KG/M2 | SYSTOLIC BLOOD PRESSURE: 126 MMHG | DIASTOLIC BLOOD PRESSURE: 82 MMHG | WEIGHT: 193 LBS

## 2021-02-10 DIAGNOSIS — Z3A.21 21 WEEKS GESTATION OF PREGNANCY: ICD-10-CM

## 2021-02-10 DIAGNOSIS — M53.3 TAIL BONE PAIN: ICD-10-CM

## 2021-02-10 DIAGNOSIS — Z34.82 ENCOUNTER FOR SUPERVISION OF OTHER NORMAL PREGNANCY IN SECOND TRIMESTER: Primary | ICD-10-CM

## 2021-02-10 DIAGNOSIS — R00.2 HEART PALPITATIONS: ICD-10-CM

## 2021-02-10 PROCEDURE — 0502F SUBSEQUENT PRENATAL CARE: CPT | Performed by: OBSTETRICS & GYNECOLOGY

## 2021-02-10 NOTE — PROGRESS NOTES
CC: Prenatal visit    Angélica Hooper is a 30 y.o.  at 21w3d.  Doing well.  Denies contractions, LOF, or VB.  Reports +FM.  Reports tail bone pain that is worsening.  She sees Dr. Lal (chiropractor) weekly.    /82   Wt 87.5 kg (193 lb)   LMP 2020 (Exact Date)   BMI 27.69 kg/m²      Fetal Heart Rate: 144    Prelim US- EFW 420g w/ AC 52%ile; MVP 5.4 cm, placenta posterior, transverse, anatomy WNL, GIRL     Problems (from 20 to present)     Problem Noted Resolved    Supervision of normal pregnancy 2020 by Usha Villaseñor, NEO No    Heart palpitations 2020 by Usha Villaseñor, NEO No        A/P: Angélica Hooper is a 30 y.o.  at 21w3d.  - RTC in 4 weeks  - Discussed no add'l US indicated unless new indications arise  - PFPT referral for tail bone pain  - Reviewed COVID-19 visitation policy  - Reviewed COVID-19 precautions     Diagnosis Plan   1. Encounter for supervision of other normal pregnancy in second trimester     2. Heart palpitations     3. Tail bone pain  Ambulatory Referral to Physical Therapy Pelvic Floor   4. 21 weeks gestation of pregnancy       Lisa Ramos MD  2/10/2021  14:06 CST

## 2021-02-23 ENCOUNTER — HOSPITAL ENCOUNTER (OUTPATIENT)
Dept: PHYSICAL THERAPY | Facility: HOSPITAL | Age: 31
Setting detail: THERAPIES SERIES
Discharge: HOME | End: 2021-02-23

## 2021-02-23 DIAGNOSIS — M53.3 COCCYX PAIN: Primary | ICD-10-CM

## 2021-02-23 PROCEDURE — 97140 MANUAL THERAPY 1/> REGIONS: CPT | Performed by: PHYSICAL THERAPIST

## 2021-02-23 PROCEDURE — 97162 PT EVAL MOD COMPLEX 30 MIN: CPT | Performed by: PHYSICAL THERAPIST

## 2021-02-24 NOTE — THERAPY EVALUATION
Outpatient Physical Therapy Pelvic Health Initial Evaluation  [facility]     Patient Name: Angélica Hooper  : 1990  MRN: [id number]  Today's Date: 2021        Visit Date: 2021  Visit Number:   Recheck: 21  Insurance: [employer]    Patient Active Problem List   Diagnosis   • Supervision of normal pregnancy   • Heart palpitations        Past Medical History:   Diagnosis Date   • Depression    • Palpitations         Past Surgical History:   Procedure Laterality Date   • ADENOIDECTOMY     • TONSILLECTOMY     • WISDOM TOOTH EXTRACTION       Current Outpatient Medications on File Prior to Encounter   Medication Sig Dispense Refill   • magnesium oxide (MAG-OX) 400 MG tablet Take 400 mg by mouth.     • Prenatal Vit-Fe Fumarate-FA (PRENATAL, CLASSIC, VITAMIN) 28-0.8 MG tablet tablet Take  by mouth Daily.       No current facility-administered medications on file prior to encounter.      Allergies   Allergen Reactions   • Penicillins Rash     Rash         Visit Dx:    ICD-10-CM ICD-9-CM   1. Coccyx pain  M53.3 724.79       Patient History     Row Name 21 0659             History    Chief Complaint  Pain  (Pended)   (Patient-Rptd)   -patient      Type of Pain  Back pain  (Pended)   (Patient-Rptd)   -patient      Date Current Problem(s) Began  21  (Pended)   (Patient-Rptd)   -patient      Brief Description of Current Complaint  Pain on my tailbone and pelvic bone  (Pended)   (Patient-Rptd)   -patient      Patient/Caregiver Goals  Relieve pain  (Pended)   (Patient-Rptd)   -patient      Hand Dominance  right-handed  (Pended)   (Patient-Rptd)   -patient      Occupation/sports/leisure activities  Teacher  (Pended)   (Patient-Rptd)   -patient      Patient seeing anyone else for problem(s)?  Chiropractor  (Pended)   (Patient-Rptd)   -patient      Are you or can you be pregnant  Yes  (Pended)   (Patient-Rptd)   -patient         Fall Risk Assessment    Any falls in the past year:   No  (Pended)   (Patient-Rptd)   -patient         Services    Prior Rehab/Home Health Experiences  No  (Pended)   (Patient-Rptd)   -patient      Are you currently receiving Home Health services  No  (Pended)   (Patient-Rptd)   -patient      Do you plan to receive Home Health services in the near future  No  (Pended)   (Patient-Rptd)   -patient         Daily Activities    Primary Language  English  (Pended)   (Patient-Rptd)   -patient      Are you able to read  Yes  (Pended)   (Patient-Rptd)   -patient      Are you able to write  Yes  (Pended)   (Patient-Rptd)   -patient      How does patient learn best?  Listening  (Pended)   (Patient-Rptd)   -patient         Safety    Are you being hurt, hit, or frightened by anyone at home or in your life?  No  (Pended)   (Patient-Rptd)   -patient      Are you being neglected by a caregiver  No  (Pended)   (Patient-Rptd)   -patient      Have you had any of the following issues with  Anxiety  (Pended)   (Patient-Rptd)   -patient        User Key  (r) = Recorded By, (t) = Taken By, (c) = Cosigned By    Initials Name Provider Type    patient Angélica Hooper --          Pelvic Health     Row Name 02/23/21 1500             Pregnancy Questions    Number of Pregnancies  4  -SW      Number of Miscarriages  1  -SW      Number of Children  2 5 and 3 yo  -SW      How many weeks pregnant are you?  23 weeks  -SW      Type of Previous Deliveries  Vaginal  -SW      Due Date  06/20/21  -SW         Pain Assessment    Pain Assessment  0-10  -SW      Pain Score  3  -SW      Post Pain Score  3  -SW        User Key  (r) = Recorded By, (t) = Taken By, (c) = Cosigned By    Initials Name Provider Type    SW Pat Perez, PT DPT Physical Therapist        PT Ortho     Row Name 02/23/21 1500       Subjective Comments    Subjective Comments  Currently pregnant.  My tailbone hurts with seated postures, but some when standing.  Often feels pain in pubic bone with standing.  Onset 1-2 months but  without injury.  Usually in LB.  Some throbbing in LE but not usually with radicular pains.  LB pain also noted with last pregnancy but little different.  Has been to see chiropracter who adjusted her (Lukasz) but couldn't get to [facility] area.  Seated position tolerance of 30 minutes.  No pain with defecation. Bowels move good without issues.  Urination is good.    -SW       Posture/Observations    Posture- WNL  Posture is WNL  -SW    Posture/Observations Comments  Alignment intact with standing position.  Supine slight ant on L side.  -SW       Quarter Clearing    Quarter Clearing  Lower Quarter Clearing  -SW       DTR- Lower Quarter Clearing    Patellar tendon (L2-4)  2- Normal response  -SW    Achilles tendon (S1-2)  2- Normal response  -SW       Neural Tension Signs- Lower Quarter Clearing    Slump  Negative  -SW    SLR  Negative  -SW       Sensory Screen for Light Touch- Lower Quarter Clearing    L1 (inguinal area)  Intact  -SW    L2 (anterior mid thigh)  Intact  -SW    L3 (distal anterior thigh)  Intact  -SW    L4 (medial lower leg/foot)  Intact  -SW    L5 (lateral lower leg/great toe)  Intact  -SW    S1 (bottom of foot)  Intact  -SW       Myotomal Screen- Lower Quarter Clearing    Hip flexion (L2)  WNL  -SW    Knee extension (L3)  WNL  -SW    Knee flexion (S2)  WNL  -SW       Lumbar ROM Screen- Lower Quarter Clearing    Lumbar Flexion  Normal  -SW    Lumbar Extension  Normal  -SW    Lumbar Lateral Flexion  Normal  -SW    Lumbar Rotation  Normal  -SW       SI/Hip Screen- Lower Quarter Clearing    ASIS compression  Positive  -SW    ASIS distraction  Negative  -SW    Mary's/Oswaldo's test  Negative  -SW       Special Tests/Palpation    Special Tests/Palpation  Lumbar/SI  -SW       Lumbosacral Accessory Motions    Lumbosacral Accessory Motions Tested?  Yes  -SW    PA glide- Sacral base  -- symmetrical  -SW    Innominate rotation  -- slight long on L side  -SW       Lumbar/SI Special Tests    Slump Test (Neural  Tension)  Negative  -SW    SLR (Neural Tension)  Negative  -SW    SI Compression Test (SI Dysfunction)  Positive  -SW    SI Distraction Test (SI Dysfunction)  Negative  -SW    BENNY (hip vs. SI Dysfunction)  Positive  -SW    Sacral Spring Test (SI Dysfunction)  Positive  -SW    Lumbar/SI Special Tests Comments  Patient presented with fair alignment and only slight ant rotation on L innominate.  Coccyx presents with good position.  Coccygeus ttp noted on L side.    -SW       Lumbosacral Palpation    Lumbosacral Palpation?  Yes  -SW    SI  -- pain with AP glide  -SW    Piriformis  Bilateral:;Tender  -SW    Coccyx  -- good position; coccygeus ttp noted L side  -SW    Lumbosacral Palpation Comments  Patient presented with slight ant rotation of innom L.  Sacral AP glides were painful with fair movement.    -SW       Special Lumbosacral Questions    Are you pregnant?  Yes  -SW    Have you had any abdominal surgeries?  No  -SW    Do you have pain when standing on one leg?  Yes  -SW    Do you have pain going up/down stairs?  Yes  -SW      User Key  (r) = Recorded By, (t) = Taken By, (c) = Cosigned By    Initials Name Provider Type    Pat Michel, PT DPT Physical Therapist                     PT Assessment/Plan     Row Name 02/23/21 1800          PT Assessment    Functional Limitations  Limitation in home management;Limitations in community activities;Performance in leisure activities;Performance in self-care ADL;Performance in work activities  -     Impairments  Impaired muscle length;Impaired postural alignment;Poor body mechanics;Pain;Muscle strength;Posture  -SW     Assessment Comments  Patient presents at 23 weeks gestation with complaints of pelvic pain primarily in coccyx.  Upon evaluation and subjective history intake, it was suggested that pain was caused from positional seated postures and sacral sitting.  Slight alignment asymmetry noted with ant rotation of innom on L side.  Surrounding tension to  muscles.  Pt would benefit from postural education, manual therapy and other skilled instruction to assist in management of her discomfort through term of pregnancy as needed.   -     Rehab Potential  Good  -     Patient/caregiver participated in establishment of treatment plan and goals  Yes  -SW     Patient would benefit from skilled therapy intervention  Yes  -SW        PT Plan    PT Frequency  1x/week  -     PT Plan Comments  Manual therapy for alignment.  Stretches and body mechanics as necessary.  Stabilization and body mechanics.  Fascial release as appropriate.   -       User Key  (r) = Recorded By, (t) = Taken By, (c) = Cosigned By    Initials Name Provider Type    Pat Michel, PT DPT Physical Therapist            OP Exercises     Row Name 02/23/21 1500             Subjective Comments    Subjective Comments  Currently pregnant.  My tailbone hurts with seated postures, but some when standing.  Often feels pain in pubic bone with standing.  Onset 1-2 months but without injury.  Usually in LB.  Some throbbing in LE but not usually with radicular pains.  LB pain also noted with last pregnancy but little different.  Has been to see chiropracter who adjusted her (Lukasz) but couldn't get to tailbone area.  Seated position tolerance of 30 minutes.  No pain with defecation. Bowels move good without issues.  Urination is good.    -         Exercise 1    Exercise Name 1  piriformis stretch   -      Reps 1  3  -SW      Time 1  30 sec  -         Exercise 2    Exercise Name 2  angry cat with neural tension and breath support  -      Additional Comments  exhalation with trunk flexion  -         Exercise 3    Exercise Name 3  proper seated posture.  -      Additional Comments  able to return demo with proper positioning noted and min cues.   -         Exercise 4    Exercise Name 4  postural support cushion given with MD approval for work/home seated activities.   -        User Key  (r)  = Recorded By, (t) = Taken By, (c) = Cosigned By    Initials Name Provider Type    Pat Michelle, PT DPT Physical Therapist           Manual Rx (last 36 hours)      Manual Treatments     Row Name 02/23/21 1700             Manual Rx 1    Manual Rx 1 Location  sacral spring  -         Manual Rx 2    Manual Rx 2 Location  sacrotuberous ligament release  -         Manual Rx 3    Manual Rx 3 Location  coccxygeus release  -         Manual Rx 4    Manual Rx 4 Location  alignment correction  -        User Key  (r) = Recorded By, (t) = Taken By, (c) = Cosigned By    Initials Name Provider Type    Pat Michelle, PT DPT Physical Therapist                    PT OP Goals     Row Name 02/23/21 1800          PT Short Term Goals    STG Date to Achieve  03/23/21  -     STG 1  patient to be indepdent with HEp as assigned at least 2-3x daily.  -     STG 1 Progress  New  -     STG 2  patient to demo and verbalize I with finding neutral position while in seated and standing postures.   -     STG 2 Progress  New  -     STG 3  patient to show proper TA engagement with stabilization exercises while in seated and standing postures as to help maintain neutral spine with dynamic movements.   -     STG 3 Progress  New  -     STG 4  patient to have symmetrical alignment to pelvic ring to support proper seated posture and balance.   -     STG 4 Progress  New  -     STG 5  pain to dec to 3/10 at worse following work day that is easily managed with rest and / or change of positions.  -     STG 5 Progress  New  -        Long Term Goals    LTG Date to Achieve  06/23/21  -     LTG 1  subjective improvement of 70%   -     LTG 1 Progress  New  -     LTG 2  patient to not miss work days due to pain or discomfort and perform without restrictions.  -     LTG 2 Progress  New  -     LTG 3  patient to tolerate inc seated activity for at least 1 hour to allow for travel to [place] without rest break  required due to pain.  -SW     LTG 3 Progress  New  -        Time Calculation    PT Goal Re-Cert Due Date  03/23/21  -       User Key  (r) = Recorded By, (t) = Taken By, (c) = Cosigned By    Initials Name Provider Type    Pat Michel, PT DPT Physical Therapist          Therapy Education  Given: HEP, Symptoms/condition management  Program: New  How Provided: Verbal, Demonstration, Written  Provided to: Patient  Level of Understanding: Teach back education performed, Verbalized, Demonstrated               Time Calculation:   Start Time: 1508  Stop Time: 1605  Time Calculation (min): 57 min  Therapy Charges for Today     Code Description Service Date Service Provider Modifiers Qty    40330457897 HC PT EVAL MOD COMPLEXITY 3 2/23/2021 Pat Perez, PT DPT GP 1    95988574974 HC PT MANUAL THERAPY EA 15 MIN 2/23/2021 Pat Perez, PT DPT GP 1                  Pat Perez, PT DPT  2/23/2021

## 2021-03-02 ENCOUNTER — HOSPITAL ENCOUNTER (OUTPATIENT)
Dept: PHYSICAL THERAPY | Facility: HOSPITAL | Age: 31
Setting detail: THERAPIES SERIES
Discharge: HOME | End: 2021-03-02

## 2021-03-02 DIAGNOSIS — M53.3 COCCYX PAIN: Primary | ICD-10-CM

## 2021-03-02 PROCEDURE — 97140 MANUAL THERAPY 1/> REGIONS: CPT | Performed by: PHYSICAL THERAPIST

## 2021-03-02 PROCEDURE — 97110 THERAPEUTIC EXERCISES: CPT | Performed by: PHYSICAL THERAPIST

## 2021-03-02 NOTE — THERAPY TREATMENT NOTE
Outpatient Physical Therapy Pelvic Health Treatment Note  [facility]     Patient Name: Angélica Hooper  : 1990  MRN: [id number]  Today's Date: 3/2/2021        Visit Date: 2021  Visit Number:   Recheck: 21  Insurance: [employer]    Visit Dx:    ICD-10-CM ICD-9-CM   1. Coccyx pain  M53.3 724.79       Patient Active Problem List   Diagnosis   • Supervision of normal pregnancy   • Heart palpitations        Pelvic Health     Row Name 21 1600             Subjective Comments    Subjective Comments  Tailbone  has been better.  Hurt a little yesterday.  Low back started to lock up again.  Seat cushio is amazing.  When she gets up, it feels like a sharp pain on the R side.  Feels sharp into butt cheek , but lessens with inc distance of walking. Next Thursday MD appt.   -SW         Pregnancy Questions    Number of Pregnancies  4  -SW      Number of Miscarriages  1  -SW      Number of Children  2  -SW      How many weeks pregnant are you?  24 weeks  -SW      Type of Previous Deliveries  Vaginal  -SW      Due Date  21  -SW         Pain Assessment    Pain Assessment  0-10  -SW      Pain Score  1 back /10   -        User Key  (r) = Recorded By, (t) = Taken By, (c) = Cosigned By    Initials Name Provider Type    Pat Michel, PT DPT Physical Therapist        PT Ortho     Row Name 21 1600       Posture/Observations    Posture- WNL  Posture is WNL  -SW    Posture/Observations Comments  No distress with appearance.  Good neutral seated posture.  Non antalgic gait.   -SW       Quarter Clearing    Quarter Clearing  Lower Quarter Clearing  -SW       DTR- Lower Quarter Clearing    Patellar tendon (L2-4)  2- Normal response  -SW    Achilles tendon (S1-2)  2- Normal response  -SW       Neural Tension Signs- Lower Quarter Clearing    Slump  Negative  -SW    SLR  Negative  -SW       Sensory Screen for Light Touch- Lower Quarter Clearing    L1 (inguinal area)  Intact  -SW     L2 (anterior mid thigh)  Intact  -SW    L3 (distal anterior thigh)  Intact  -SW    L4 (medial lower leg/foot)  Intact  -SW    L5 (lateral lower leg/great toe)  Intact  -SW    S1 (bottom of foot)  Intact  -SW       Myotomal Screen- Lower Quarter Clearing    Hip flexion (L2)  WNL  -SW    Knee extension (L3)  WNL  -SW    Knee flexion (S2)  WNL  -SW       Lumbar ROM Screen- Lower Quarter Clearing    Lumbar Flexion  Normal  -SW    Lumbar Extension  Normal  -SW    Lumbar Lateral Flexion  Normal  -SW    Lumbar Rotation  Normal  -SW       SI/Hip Screen- Lower Quarter Clearing    ASIS compression  Positive  -SW    ASIS distraction  Negative  -SW    Mary's/Oswaldo's test  Negative  -SW       Special Tests/Palpation    Special Tests/Palpation  Lumbar/SI  -SW       Lumbosacral Accessory Motions    Lumbosacral Accessory Motions Tested?  Yes  -    PA glide- Sacral base  -- L base elevated in prone  -SW    Innominate rotation  -- ant on R side  -SW       Lumbar/SI Special Tests    SLR (Neural Tension)  Negative  -SW    SI Compression Test (SI Dysfunction)  Positive  -SW    SI Distraction Test (SI Dysfunction)  Negative  -    MARY (hip vs. SI Dysfunction)  Positive  -SW    Lumbar/SI Special Tests Comments  Patient's alignment altered this date with ant rotation of innom being present on R side vs L.  Sacral base prominent on L side as well in prone.    -SW       Lumbosacral Palpation    Lumbosacral Palpation?  Yes  -    SI  -- L base painful; prominent  -SW    Piriformis  Bilateral:;Tender  -SW    Coccyx  -- good position; coccygeus ttp noted L side  -SW    Lumbosacral Palpation Comments  Patient with more ttp this date along L base of sacrum.  LB/ pelvic position alterred this date compared to last visit.  Easily corrected with MET.   -      User Key  (r) = Recorded By, (t) = Taken By, (c) = Cosigned By    Initials Name Provider Type    Pat Michel, PT DPT Physical Therapist                     PT  Assessment/Plan     Row Name 03/02/21 1600          PT Assessment    Functional Limitations  Limitation in home management;Limitations in community activities;Performance in leisure activities;Performance in self-care ADL;Performance in work activities  -     Impairments  Impaired muscle length;Impaired postural alignment;Poor body mechanics;Pain;Muscle strength;Posture  -SW     Assessment Comments  Patient compliant with recommendations. She showed altered alignment this date with ant rotation on  R vs L and sacral base prominent on L side.  Improved sitting tolerance with use of [place] cushion.  Needs reinforcments of stabilization to assist with instability in pelvic girdle.   -SW     Rehab Potential  Good  -SW     Patient/caregiver participated in establishment of treatment plan and goals  Yes  -SW     Patient would benefit from skilled therapy intervention  Yes  -SW        PT Plan    PT Frequency  1x/week  -SW     PT Plan Comments  add physioball stabilization next visit; prone TKE and manual as indicated.  -       User Key  (r) = Recorded By, (t) = Taken By, (c) = Cosigned By    Initials Name Provider Type    Pat Michel, PT DPT Physical Therapist            OP Exercises     Row Name 03/02/21 1600             Subjective Comments    Subjective Comments  Tailbone  has been better.  Hurt a little yesterday.  Low back started to lock up again.  Seat cushio is amazing.  When she gets up, it feels like a sharp pain on the R side.  Feels sharp into butt cheek , but lessens with inc distance of walking. Next Thursday MD appt.   -SW         Exercise 1    Exercise Name 1  hamstring stretch   -SW      Reps 1  3  -SW      Time 1  30 sec  -SW         Exercise 2    Exercise Name 2  adductor stretch   -SW      Reps 2  3  -SW      Time 2  30 sec  -SW         Exercise 3    Exercise Name 3  piriformis stretch   -SW      Reps 3  3  -SW      Time 3  30 sec  -SW         Exercise 4    Exercise Name 4  pelvic tilt on  physioball ant/lateral directions  -SW      Time 4  2'min   -SW         Exercise 5    Exercise Name 5  physioball circles CW/CCW  -SW      Time 5  2'min each  -SW        User Key  (r) = Recorded By, (t) = Taken By, (c) = Cosigned By    Initials Name Provider Type    SW Pat Perez Ander, PT DPT Physical Therapist           Manual Rx (last 36 hours)      Manual Treatments     Row Name 03/02/21 1700             Manual Rx 1    Manual Rx 1 Location  alignment assessment  -SW         Manual Rx 2    Manual Rx 2 Location  alignment MEt for ant rotation of L innom and sacral prominence on L   -SW         Manual Rx 3    Manual Rx 3 Location  lumbosacral fascial release  -SW        User Key  (r) = Recorded By, (t) = Taken By, (c) = Cosigned By    Initials Name Provider Type    KARIE Perez Patrandal Worthington, PT DPT Physical Therapist                    PT OP Goals     Row Name 03/02/21 1600          PT Short Term Goals    STG Date to Achieve  03/23/21  -     STG 1  patient to be indepdent with HEp as assigned at least 2-3x daily.  -     STG 1 Progress  Progressing  -     STG 2  patient to demo and verbalize I with finding neutral position while in seated and standing postures.   -     STG 2 Progress  Progressing  -     STG 3  patient to show proper TA engagement with stabilization exercises while in seated and standing postures as to help maintain neutral spine with dynamic movements.   -     STG 3 Progress  New  -     STG 4  patient to have symmetrical alignment to pelvic ring to support proper seated posture and balance.   -     STG 4 Progress  New  -     STG 5  pain to dec to 3/10 at worse following work day that is easily managed with rest and / or change of positions.  -     STG 5 Progress  New  -        Long Term Goals    LTG Date to Achieve  06/23/21  -     LTG 1  subjective improvement of 70%   -     LTG 1 Progress  New  -     LTG 2  patient to not miss work days due to pain or discomfort and  perform without restrictions.  -     LTG 2 Progress  New  -     LTG 3  patient to tolerate inc seated activity for at least 1 hour to allow for travel to [place] without rest break required due to pain.  -     LTG 3 Progress  New  -        Time Calculation    PT Goal Re-Cert Due Date  03/23/21  -       User Key  (r) = Recorded By, (t) = Taken By, (c) = Cosigned By    Initials Name Provider Type    Pat Michel, PT DPT Physical Therapist           Therapy Education  Given: HEP  Program: Reinforced, Progressed  How Provided: Verbal, Demonstration, Written  Provided to: Patient  Level of Understanding: Teach back education performed, Verbalized, Demonstrated              Time Calculation:   Start Time: 1610  Stop Time: 1706  Time Calculation (min): 56 min  Therapy Charges for Today     Code Description Service Date Service Provider Modifiers Qty    72167504176  PT THER PROC EA 15 MIN 3/2/2021 Pat Perez, PT DPT GP 3    22978090154 HC PT MANUAL THERAPY EA 15 MIN 3/2/2021 Pat Perez, PT DPT GP 1                    Pat Perez PT DPT  3/2/2021

## 2021-03-11 ENCOUNTER — ROUTINE PRENATAL (OUTPATIENT)
Dept: OBSTETRICS AND GYNECOLOGY | Facility: CLINIC | Age: 31
End: 2021-03-11

## 2021-03-11 ENCOUNTER — HOSPITAL ENCOUNTER (OUTPATIENT)
Dept: PHYSICAL THERAPY | Facility: HOSPITAL | Age: 31
Setting detail: THERAPIES SERIES
Discharge: HOME | End: 2021-03-11

## 2021-03-11 VITALS — BODY MASS INDEX: 27.86 KG/M2 | WEIGHT: 194.2 LBS | SYSTOLIC BLOOD PRESSURE: 124 MMHG | DIASTOLIC BLOOD PRESSURE: 82 MMHG

## 2021-03-11 DIAGNOSIS — Z3A.25 25 WEEKS GESTATION OF PREGNANCY: ICD-10-CM

## 2021-03-11 DIAGNOSIS — Z34.82 ENCOUNTER FOR SUPERVISION OF OTHER NORMAL PREGNANCY IN SECOND TRIMESTER: Primary | ICD-10-CM

## 2021-03-11 DIAGNOSIS — M53.3 COCCYX PAIN: Primary | ICD-10-CM

## 2021-03-11 DIAGNOSIS — R00.2 HEART PALPITATIONS: ICD-10-CM

## 2021-03-11 PROCEDURE — 0502F SUBSEQUENT PRENATAL CARE: CPT | Performed by: OBSTETRICS & GYNECOLOGY

## 2021-03-11 PROCEDURE — 97110 THERAPEUTIC EXERCISES: CPT | Performed by: PHYSICAL THERAPIST

## 2021-03-11 PROCEDURE — 97140 MANUAL THERAPY 1/> REGIONS: CPT | Performed by: PHYSICAL THERAPIST

## 2021-03-11 NOTE — PROGRESS NOTES
CC: Prenatal visit    Angélica Hooper is a 30 y.o.  at 25w4d.  Doing well.  Denies contractions, LOF, or VB.  Reports good FM.    /82   Wt 88.1 kg (194 lb 3.2 oz)   LMP 2020 (Exact Date)   BMI 27.86 kg/m²   Fundal Height (cm): 24 cm  Fetal Heart Rate: 133     Problems (from 20 to present)     Problem Noted Resolved    Supervision of normal pregnancy 2020 by Usha Villaseñor, NEO No    Heart palpitations 2020 by Usha Villaseñor, NEO No        A/P: Angélica Hooper is a 30 y.o.  at 25w4d.  - RTC in 4 weeks w/ 3T labs, Tdap, breastpump script  - Reviewed COVID-19 visitation policy  - Reviewed COVID-19 precautions     Diagnosis Plan   1. Encounter for supervision of other normal pregnancy in second trimester  CBC (No Diff)    Glucose, Post 50 Gm Glucola   2. Heart palpitations     3. 25 weeks gestation of pregnancy       Lisa Ramos MD  3/11/2021  15:46 CST

## 2021-03-11 NOTE — THERAPY TREATMENT NOTE
Outpatient Physical Therapy Pelvic Health Treatment Note  [facility]     Patient Name: Angélica Hooper  : 1990  MRN: [id number]  Today's Date: 3/11/2021        Visit Date: 2021   Visit Number: 3/3  Recheck: 21  Insurance: [employer]    Visit Dx:    ICD-10-CM ICD-9-CM   1. Coccyx pain  M53.3 724.79       Patient Active Problem List   Diagnosis   • Supervision of normal pregnancy   • Heart palpitations          PT Ortho     Row Name 21 1500       Subjective Comments    Subjective Comments  Has been good.  Yesterday was a rough day but forgot her pillow which may have caused it.  Hurt directly at tailbone.  Tailbone 1/10 and LB good today. Not hurting in front like she was.   -SW       Subjective Pain    Able to rate subjective pain?  yes  -SW    Pre-Treatment Pain Level  1  -SW    Post-Treatment Pain Level  0  -SW       Posture/Observations    Posture- WNL  Posture is WNL  -SW    Posture/Observations Comments  Good spirits.  Gait without antalgia  -SW       Quarter Clearing    Quarter Clearing  Lower Quarter Clearing  -SW       DTR- Lower Quarter Clearing    Patellar tendon (L2-4)  2- Normal response  -SW    Achilles tendon (S1-2)  2- Normal response  -SW       Neural Tension Signs- Lower Quarter Clearing    Slump  Negative  -SW    SLR  Negative  -SW       Sensory Screen for Light Touch- Lower Quarter Clearing    L1 (inguinal area)  Intact  -SW    L2 (anterior mid thigh)  Intact  -SW    L3 (distal anterior thigh)  Intact  -SW    L4 (medial lower leg/foot)  Intact  -SW    L5 (lateral lower leg/great toe)  Intact  -SW    S1 (bottom of foot)  Intact  -SW       Myotomal Screen- Lower Quarter Clearing    Hip flexion (L2)  WNL  -SW    Knee extension (L3)  WNL  -SW    Knee flexion (S2)  WNL  -SW       Lumbar ROM Screen- Lower Quarter Clearing    Lumbar Flexion  Normal  -SW    Lumbar Extension  Normal  -SW    Lumbar Lateral Flexion  Normal  -SW    Lumbar Rotation  Normal  -SW        SI/Hip Screen- Lower Quarter Clearing    ASIS compression  Negative  -    ASIS distraction  Negative  -    Mary's/Oswaldo's test  Negative  -       Special Tests/Palpation    Special Tests/Palpation  Lumbar/SI  -       Lumbosacral Accessory Motions    Lumbosacral Accessory Motions Tested?  Yes  -    PA glide- Sacral base  -- more symmetrical this visit  -    PA glide- Sacral apex  Left pain  -    Innominate rotation  -- ant on R side  -       Lumbar/SI Special Tests    SLR (Neural Tension)  Negative  -    SI Compression Test (SI Dysfunction)  Negative  -    SI Distraction Test (SI Dysfunction)  Negative  -    MARY (hip vs. SI Dysfunction)  Positive  -    Lumbar/SI Special Tests Comments  SI provocative tests negative this date.  Improved alignment throughout.   -       Lumbosacral Palpation    Lumbosacral Palpation?  Yes  -    SI  -- more symmetrical   -    Piriformis  Left:;Tender;Trigger point  -    Coccyx  -- L side noted with tenderness  -    Lumbosacral Palpation Comments  L sacral base continues with tenderness along sacrococcygeal ligament.  Pirirformis trigger also noted.   -      User Key  (r) = Recorded By, (t) = Taken By, (c) = Cosigned By    Initials Name Provider Type    Pat Michel, PT DPT Physical Therapist                     PT Assessment/Plan     Row Name 03/11/21 1500          PT Assessment    Functional Limitations  Limitation in home management;Limitations in community activities;Performance in leisure activities;Performance in self-care ADL;Performance in work activities  -     Impairments  Impaired muscle length;Impaired postural alignment;Poor body mechanics;Pain;Muscle strength;Posture  -     Assessment Comments  Patient with improved alignment this visit.  Most of pubic pain has diminished along with provocative tests for SI dysfunction.  Most of discomfort today was surrounding coccyx on L side.  + sacrococcygeal ligament tension along  with piriformis trigger on L side.  Anticipate inc coccyx pain due to sitting on hard floor putting in laminate orion.  Improved s/s post treatment with dec ligament tension and more muscle/fascial mobility.  Compliant with HEP but needs reinforcement for proper seated neutral posture.    -SW     Rehab Potential  Good  -SW     Patient/caregiver participated in establishment of treatment plan and goals  Yes  -SW     Patient would benefit from skilled therapy intervention  Yes  -SW        PT Plan    PT Frequency  1x/week  -SW     PT Plan Comments  add leg raises in prone. TA with clamshells and hip Abd with TA.    -SW       User Key  (r) = Recorded By, (t) = Taken By, (c) = Cosigned By    Initials Name Provider Type    SW Pat Perez, PT DPT Physical Therapist            OP Exercises     Row Name 03/11/21 1500             Subjective Comments    Subjective Comments  Has been good.  Yesterday was a rough day but forgot her pillow which may have caused it.  Hurt directly at tailbone.  Tailbone 1/10 and LB good today. Not hurting in front like she was.   -SW         Subjective Pain    Able to rate subjective pain?  yes  -SW      Pre-Treatment Pain Level  1  -SW      Post-Treatment Pain Level  0  -SW         Exercise 1    Exercise Name 1  hamstring stretch   -SW      Reps 1  3  -SW      Time 1  30 sec  -SW         Exercise 2    Exercise Name 2  adductor stretch   -SW      Reps 2  3  -SW      Time 2  30 sec  -SW         Exercise 3    Exercise Name 3  piriformis stretch   -SW      Reps 3  3  -SW      Time 3  30 sec  -SW         Exercise 4    Exercise Name 4  physioball lateral tilts and ant/post  -SW      Time 4  2'min ea  -SW         Exercise 5    Exercise Name 5  physioball circles CW/CCW  -SW      Time 5  2'min ea  -SW         Exercise 6    Exercise Name 6  prone TKE with pelvic brace  -SW      Sets 6  2  -SW      Reps 6  10  -SW        User Key  (r) = Recorded By, (t) = Taken By, (c) = Cosigned By    Initials  Name Provider Type    Pat Michel, PT DPT Physical Therapist           Manual Rx (last 36 hours)      Manual Treatments     Row Name 03/11/21 1700             Manual Rx 1    Manual Rx 1 Location  alignment assessment   -         Manual Rx 2    Manual Rx 2 Location  saccrococcygeal ligament release  -         Manual Rx 3    Manual Rx 3 Location  piriformis and coccygeus release  -         Manual Rx 4    Manual Rx 4 Location  sacral float  -        User Key  (r) = Recorded By, (t) = Taken By, (c) = Cosigned By    Initials Name Provider Type    Pat Michel, PT DPT Physical Therapist                    PT OP Goals     Row Name 03/11/21 1600 03/11/21 1500       PT Short Term Goals    STG Date to Achieve  03/23/21  -SW  03/23/21  -    STG 1  patient to be indepdent with HEp as assigned at least 2-3x daily.  -  patient to be indepdent with HEp as assigned at least 2-3x daily.  -    STG 1 Progress  Met  -  Progressing  -    STG 2  patient to demo and verbalize I with finding neutral position while in seated and standing postures.   -  patient to demo and verbalize I with finding neutral position while in seated and standing postures.   -    STG 2 Progress  Progressing  -  Progressing  -    STG 3  patient to show proper TA engagement with stabilization exercises while in seated and standing postures as to help maintain neutral spine with dynamic movements.   -  patient to show proper TA engagement with stabilization exercises while in seated and standing postures as to help maintain neutral spine with dynamic movements.   -    STG 3 Progress  Progressing  -  New  -    STG 4  patient to have symmetrical alignment to pelvic ring to support proper seated posture and balance.   -  patient to have symmetrical alignment to pelvic ring to support proper seated posture and balance.   -    STG 4 Progress  Progressing  -  New  -    STG 5  pain to dec to 3/10 at worse  following work day that is easily managed with rest and / or change of positions.  -SW  pain to dec to 3/10 at worse following work day that is easily managed with rest and / or change of positions.  -SW    STG 5 Progress  New  -  New  -       Long Term Goals    LTG Date to Achieve  06/23/21  -SW  06/23/21  -SW    LTG 1  subjective improvement of 70%   -SW  subjective improvement of 70%   -SW    LTG 1 Progress  New  -  New  -    LTG 2  patient to not miss work days due to pain or discomfort and perform without restrictions.  -SW  patient to not miss work days due to pain or discomfort and perform without restrictions.  -SW    LTG 2 Progress  New  -  New  -    LTG 3  patient to tolerate inc seated activity for at least 1 hour to allow for travel to [place] without rest break required due to pain.  -SW  patient to tolerate inc seated activity for at least 1 hour to allow for travel to [place] without rest break required due to pain.  -SW    LTG 3 Progress  New  -  New  -       Time Calculation    PT Goal Re-Cert Due Date  03/23/21  -SW  03/23/21  -      User Key  (r) = Recorded By, (t) = Taken By, (c) = Cosigned By    Initials Name Provider Type    Pat Michel, PT DPT Physical Therapist           Therapy Education  Given: HEP, Symptoms/condition management  Program: Reinforced  How Provided: Verbal, Demonstration  Provided to: Patient  Level of Understanding: Teach back education performed, Verbalized, Demonstrated              Time Calculation:   Start Time: 1540  Stop Time: 1634  Time Calculation (min): 54 min  Therapy Charges for Today     Code Description Service Date Service Provider Modifiers Qty    87985265007 HC PT MANUAL THERAPY EA 15 MIN 3/11/2021 Pat Perez, PT DPT GP 2    61081314071 HC PT THER PROC EA 15 MIN 3/11/2021 Pat Perez, PT DPT GP 2                    Pat Perez PT DPT  3/11/2021

## 2021-03-18 ENCOUNTER — HOSPITAL ENCOUNTER (OUTPATIENT)
Dept: PHYSICAL THERAPY | Facility: HOSPITAL | Age: 31
Setting detail: THERAPIES SERIES
Discharge: HOME | End: 2021-03-18

## 2021-03-18 DIAGNOSIS — M53.3 COCCYX PAIN: Primary | ICD-10-CM

## 2021-03-18 PROCEDURE — 97140 MANUAL THERAPY 1/> REGIONS: CPT | Performed by: PHYSICAL THERAPIST

## 2021-03-18 PROCEDURE — 97110 THERAPEUTIC EXERCISES: CPT | Performed by: PHYSICAL THERAPIST

## 2021-03-19 NOTE — THERAPY TREATMENT NOTE
Outpatient Physical Therapy Pelvic Health Treatment Note  [facility]     Patient Name: Angélica Hooper  : 1990  MRN: [id number]  Today's Date: 3/19/2021        Visit Date: 2021  Visit Number:   Recheck: 3/23/21  Insurance: [employer]      Visit Dx:    ICD-10-CM ICD-9-CM   1. Coccyx pain  M53.3 724.79       Patient Active Problem List   Diagnosis   • Supervision of normal pregnancy   • Heart palpitations        Pelvic Health     Row Name 21 1500             Pregnancy Questions    Number of Pregnancies  4  -SW      Number of Miscarriages  1  -SW      Number of Children  2  -SW      How many weeks pregnant are you?  26 weeks  -SW      Type of Previous Deliveries  Vaginal  -SW      Due Date  21  -SW         Pain Assessment    Pain Assessment  0-10  -SW      Pain Score  1  -SW      Post Pain Score  0  -SW         Lumbar/SI Special Tests    Lumbar/SI Special Tests Comments  Negative provocative tests noted this visit.  Remains ttp along coccyx edge.    -SW        User Key  (r) = Recorded By, (t) = Taken By, (c) = Cosigned By    Initials Name Provider Type    SW Pat Perez, PT DPT Physical Therapist        PT Ortho     Row Name 21 1500       Subjective Comments    Subjective Comments  Tailbone still hurts.   nights after [employer] always seem worse.  Believes it may have to do with [employer] pews.    -SW       Subjective Pain    Able to rate subjective pain?  yes  -SW    Pre-Treatment Pain Level  1  -SW    Post-Treatment Pain Level  0  -SW       Posture/Observations    Posture/Observations Comments  Good mood and judgement.  No distress in appearance. Equal step and stride  noted bilaterally.   -SW       SI/Hip Screen- Lower Quarter Clearing    Pain in Denisha's area  Positive  -SW       Special Tests/Palpation    Special Tests/Palpation  Lumbar/SI  -SW       Lumbosacral Accessory Motions    PA glide- Sacral base  -- aligned this visit; pain at tailbone  -SW     Innominate rotation  -- more symmetrical alignment  -       Lumbosacral Palpation    Coccyx  -- coccygeus tenderness noted bilaterally, more to L  -SW    Lumbosacral Palpation Comments  Alignment improved this visit.  + ttp to coccygeus continues bilaterally L>R  -      User Key  (r) = Recorded By, (t) = Taken By, (c) = Cosigned By    Initials Name Provider Type    Pat Michel, PT DPT Physical Therapist                     PT Assessment/Plan     Row Name 03/18/21 1500          PT Assessment    Functional Limitations  Limitation in home management;Limitations in community activities;Performance in leisure activities;Performance in self-care ADL;Performance in work activities  -     Impairments  Impaired muscle length;Impaired postural alignment;Poor body mechanics;Pain;Muscle strength;Posture  -SW     Assessment Comments  Patient continues to experience coccyx pain with prolonged seated positions.  She is able to find neutral spine position, but ability to maintain for long duration is difficult for patient.  Pain is low but remains present despite manual work.  Alignment improved this visit.  Compliant with stretching program.  Tolerant to new exercise additions.  STG 1 and 4 met consistently.  -SW     Rehab Potential  Good  -     Patient/caregiver participated in establishment of treatment plan and goals  Yes  -SW     Patient would benefit from skilled therapy intervention  Yes  -SW        PT Plan    PT Frequency  1x/week  -     PT Plan Comments  prone TKE and extension next visit.  -SW       User Key  (r) = Recorded By, (t) = Taken By, (c) = Cosigned By    Initials Name Provider Type    Pat Michel, PT DPT Physical Therapist            OP Exercises     Row Name 03/18/21 1500             Subjective Comments    Subjective Comments  Tailbone still hurts.  Sunday nights after [employer] always seem worse.  Believes it may have to do with [employer] pews.    -SW         Subjective Pain    Able  to rate subjective pain?  yes  -SW      Pre-Treatment Pain Level  1  -SW      Post-Treatment Pain Level  0  -SW         Exercise 1    Exercise Name 1  hamstring stretch   -SW      Time 1  1'min   -SW         Exercise 2    Exercise Name 2  adductor stretch   -SW      Time 2  1'min   -SW         Exercise 3    Exercise Name 3  Hip Abd pelvic brace.   -SW      Sets 3  2  -SW      Reps 3  10  -SW      Additional Comments  bilateral  -SW         Exercise 4    Exercise Name 4  clamshells with pelvic brace  -SW      Sets 4  2  -SW      Reps 4  10  -SW      Additional Comments  bilateral   -SW         Exercise 5    Exercise Name 5  seated neutral posture  -SW      Additional Comments  pt able to find and maintain seated neutral posture for short durations.  Noted to reduce to sacral sitting shortly upon finding neutral.   -        User Key  (r) = Recorded By, (t) = Taken By, (c) = Cosigned By    Initials Name Provider Type    Pat Michel, PT DPT Physical Therapist           Manual Rx (last 36 hours)      Manual Treatments     Row Name 03/19/21 0900             Manual Rx 1    Manual Rx 1 Location  alignment assessment  -SW         Manual Rx 2    Manual Rx 2 Location  saccrococcygeal ligament release  -SW         Manual Rx 3    Manual Rx 3 Location  sacral float  -SW        User Key  (r) = Recorded By, (t) = Taken By, (c) = Cosigned By    Initials Name Provider Type    Pat Michel, PT DPT Physical Therapist                    PT OP Goals     Row Name 03/19/21 1000          PT Short Term Goals    STG Date to Achieve  03/23/21  -     STG 1  patient to be indepdent with HEp as assigned at least 2-3x daily.  -     STG 1 Progress  Met  -     STG 2  patient to demo and verbalize I with finding neutral position while in seated and standing postures.   -     STG 2 Progress  Progressing  -     STG 3  patient to show proper TA engagement with stabilization exercises while in seated and standing postures  as to help maintain neutral spine with dynamic movements.   -     STG 3 Progress  Progressing  -     STG 4  patient to have symmetrical alignment to pelvic ring to support proper seated posture and balance.   -     STG 4 Progress  Met  -     STG 5  pain to dec to 3/10 at worse following work day that is easily managed with rest and / or change of positions.  -[facility] 5 Progress  Progressing  -        Long Term Goals    LTG Date to Achieve  06/23/21  -     LTG 1  subjective improvement of 70%   -     LTG 1 Progress  New  -     LTG 2  patient to not miss work days due to pain or discomfort and perform without restrictions.  -     LTG 2 Progress  New  -     LTG 3  patient to tolerate inc seated activity for at least 1 hour to allow for travel to [place] without rest break required due to pain.  -     LTG 3 Progress  New  -        Time Calculation    PT Goal Re-Cert Due Date  03/23/21  -       User Key  (r) = Recorded By, (t) = Taken By, (c) = Cosigned By    Initials Name Provider Type    Pat Michel, PT DPT Physical Therapist           Therapy Education  Given: HEP, Symptoms/condition management  Program: Reinforced, Progressed  How Provided: Verbal, Demonstration  Provided to: Patient  Level of Understanding: Teach back education performed, Verbalized, Demonstrated              Time Calculation:   Start Time: 1500  Stop Time: 1545  Time Calculation (min): 45 min  Therapy Charges for Today     Code Description Service Date Service Provider Modifiers Qty    86718332547  PT THER PROC EA 15 MIN 3/18/2021 Pat Perez, PT DPT GP 2    [id number]  PT MANUAL THERAPY EA 15 MIN 3/18/2021 Pat Perez, PT DPT GP 1                    Pat Perez PT DPT  3/19/2021

## 2021-03-25 ENCOUNTER — HOSPITAL ENCOUNTER (OUTPATIENT)
Dept: PHYSICAL THERAPY | Facility: HOSPITAL | Age: 31
Setting detail: THERAPIES SERIES
Discharge: HOME | End: 2021-03-25

## 2021-03-25 DIAGNOSIS — M53.3 COCCYX PAIN: Primary | ICD-10-CM

## 2021-03-25 PROCEDURE — 97110 THERAPEUTIC EXERCISES: CPT | Performed by: PHYSICAL THERAPIST

## 2021-03-25 PROCEDURE — 97140 MANUAL THERAPY 1/> REGIONS: CPT | Performed by: PHYSICAL THERAPIST

## 2021-03-26 NOTE — THERAPY RE-EVALUATION
Outpatient Physical Therapy Pelvic Health Progress Note  [facility]     Patient Name: Angélica Hooper  : 1990  MRN: [id number]  Today's Date: 3/26/2021        Visit Date: 2021   Visit Number:   Recheck: 3/23/21  Insurance: [employer]  Improvement: 50-60%    Visit Dx:    ICD-10-CM ICD-9-CM   1. Coccyx pain  M53.3 724.79       Patient Active Problem List   Diagnosis   • Supervision of normal pregnancy   • Heart palpitations        Pelvic Health     Row Name 21 1600             Pregnancy Questions    Number of Pregnancies  4  -SW      Number of Miscarriages  1  -SW      Number of Children  2  -SW      How many weeks pregnant are you?  28 weeks  -SW      Type of Previous Deliveries  Vaginal  -SW      Due Date  21  -SW        User Key  (r) = Recorded By, (t) = Taken By, (c) = Cosigned By    Initials Name Provider Type    SW Pat Perez, PT DPT Physical Therapist        PT Ortho     Row Name 21 1600       Subjective Comments    Subjective Comments  Still some tailbone pain but some better.  Felt more in ant pelvis.    -SW       Subjective Pain    Able to rate subjective pain?  yes  -SW    Pre-Treatment Pain Level  0  -SW    Post-Treatment Pain Level  0  -SW       Posture/Observations    Posture- WNL  Posture is WNL  -SW    Posture/Observations Comments  Alignment level this date.  Normal gait. Able to sit with equal WB bilaterally in the clinic.   -SW       DTR- Lower Quarter Clearing    Patellar tendon (L2-4)  2- Normal response  -SW    Achilles tendon (S1-2)  2- Normal response  -SW       Neural Tension Signs- Lower Quarter Clearing    Slump  Negative  -SW    SLR  Negative  -SW       Sensory Screen for Light Touch- Lower Quarter Clearing    L1 (inguinal area)  Intact  -SW    L2 (anterior mid thigh)  Intact  -SW    L3 (distal anterior thigh)  Intact  -SW    L4 (medial lower leg/foot)  Intact  -SW    L5 (lateral lower leg/great toe)  Intact  -SW    S1 (bottom of  foot)  Intact  -SW       Myotomal Screen- Lower Quarter Clearing    Hip flexion (L2)  WNL  -SW    Knee extension (L3)  WNL  -SW    Knee flexion (S2)  WNL  -SW       SI/Hip Screen- Lower Quarter Clearing    Pain in Denisha's area  -- coccygeus L > R ttp   -SW       Lumbosacral Accessory Motions    PA glide- Sacral base  -- good alignment  -SW    PA glide- Sacral apex  Left pain  -SW    Innominate rotation  -- aligned  -SW       Lumbar/SI Special Tests    Lumbar/SI Special Tests Comments  Patient's alignment presents with improved positioning.  Remains ttp along coccygeus on L side, but improved overall.   -SW       Lumbosacral Palpation    Piriformis  Left:;Tender  -SW    Coccyx  -- L ttp compared to R   -SW    Lumbosacral Palpation Comments  cont with L ttp along coccygeus muscle.   -SW      User Key  (r) = Recorded By, (t) = Taken By, (c) = Cosigned By    Initials Name Provider Type    Pat Michel, PT DPT Physical Therapist                     PT Assessment/Plan     Row Name 03/25/21 1600          PT Assessment    Functional Limitations  Limitation in home management;Limitations in community activities;Performance in leisure activities;Performance in self-care ADL;Performance in work activities  -SW     Impairments  Impaired muscle length;Impaired postural alignment;Poor body mechanics;Pain;Muscle strength;Posture  -SW     Assessment Comments  Patient shows continued improvements of symptoms.  She is improving sit tolerance and states she has improved awareness of body position when completing daily activities at home, school and [employer].  She remains ttp along L coccygeus muscle but alignment has been good without MET required.  Pt is progressing well.  STG 1, 2, 4, and 5 met at this time.   -SW     Rehab Potential  Good  -SW     Patient/caregiver participated in establishment of treatment plan and goals  Yes  -SW     Patient would benefit from skilled therapy intervention  Yes  -SW        PT Plan    PT  Frequency  1x/week  -SW     PT Plan Comments  add prone opp arm/leg extension next visit.  continue with manual therapy as needed.  Sacrospinous ligament release next visit.   -SW       User Key  (r) = Recorded By, (t) = Taken By, (c) = Cosigned By    Initials Name Provider Type     Pat Perez, PT DPT Physical Therapist            OP Exercises     Row Name 03/25/21 1600             Subjective Comments    Subjective Comments  Still some tailbone pain but some better.  Felt more in ant pelvis.    -SW         Subjective Pain    Able to rate subjective pain?  yes  -SW      Pre-Treatment Pain Level  0  -SW      Post-Treatment Pain Level  0  -SW         Exercise 1    Exercise Name 1  hamstring stretch   -SW      Reps 1  2  -SW      Time 1  30 sec  -SW         Exercise 2    Exercise Name 2  adductor stretch   -SW      Reps 2  2  -SW      Time 2  30 sec  -SW         Exercise 3    Exercise Name 3  clamshells with TA contraction   -SW      Reps 3  12  -SW         Exercise 4    Exercise Name 4  Hip Abd with TA  -SW      Reps 4  12  -SW         Exercise 5    Exercise Name 5  prone TKE with TA  -SW      Sets 5  2  -SW      Reps 5  12  -SW      Additional Comments  bilaterally   -SW         Exercise 6    Exercise Name 6  prone TKE with hip ext  -SW      Reps 6  12  -SW        User Key  (r) = Recorded By, (t) = Taken By, (c) = Cosigned By    Initials Name Provider Type     Pat Perez, PT DPT Physical Therapist           Manual Rx (last 36 hours)      Manual Treatments     Row Name 03/26/21 1100             Manual Rx 1    Manual Rx 1 Location  alignment assessment   -SW         Manual Rx 2    Manual Rx 2 Location  coccygeus release  -SW         Manual Rx 3    Manual Rx 3 Location  sacral float  -SW        User Key  (r) = Recorded By, (t) = Taken By, (c) = Cosigned By    Initials Name Provider Type    Pat Michel, PT DPT Physical Therapist                    PT OP Goals     Row Name 03/25/21 1600           PT Short Term Goals    STG Date to Achieve  03/23/21  -     STG 1  patient to be indepdent with HEp as assigned at least 2-3x daily.  -     STG 1 Progress  Met  -     STG 2  patient to demo and verbalize I with finding neutral position while in seated and standing postures.   -     STG 2 Progress  Met  -     STG 3  patient to show proper TA engagement with stabilization exercises while in seated and standing postures as to help maintain neutral spine with dynamic movements.   -     STG 3 Progress  Ongoing;Progressing  -     STG 4  patient to have symmetrical alignment to pelvic ring to support proper seated posture and balance.   -     STG 4 Progress  Met  -     STG 5  pain to dec to 3/10 at worse following work day that is easily managed with rest and / or change of positions.  -     STG 5 Progress  Met  -        Long Term Goals    LTG Date to Achieve  06/23/21  -     LTG 1  subjective improvement of 70%   -     LTG 1 Progress  Progressing  -     LTG 2  patient to not miss work days due to pain or discomfort and perform without restrictions.  -     LTG 2 Progress  Progressing  -     LTG 3  patient to tolerate inc seated activity for at least 1 hour to allow for travel to [place] without rest break required due to pain.  -     LTG 3 Progress  New  -        Time Calculation    PT Goal Re-Cert Due Date  04/22/21  -       User Key  (r) = Recorded By, (t) = Taken By, (c) = Cosigned By    Initials Name Provider Type    Pat Michel, PT DPT Physical Therapist           Therapy Education  Given: HEP, Symptoms/condition management  Program: Reinforced, Progressed  How Provided: Verbal, Demonstration  Provided to: Patient  Level of Understanding: Teach back education performed, Verbalized, Demonstrated              Time Calculation:   Start Time: 1603  Stop Time: 1702  Time Calculation (min): 59 min  Therapy Charges for Today     Code Description Service Date Service  Provider Modifiers Qty    23255918383 HC PT MANUAL THERAPY EA 15 MIN 3/25/2021 Pat Perez, PT DPT GP 2    17201678865 HC PT THER PROC EA 15 MIN 3/25/2021 Pat Perez, PT DPT GP 2                    Pat Perez, PT DPT  3/26/2021

## 2021-04-01 ENCOUNTER — HOSPITAL ENCOUNTER (OUTPATIENT)
Dept: PHYSICAL THERAPY | Facility: HOSPITAL | Age: 31
Setting detail: THERAPIES SERIES
Discharge: HOME | End: 2021-04-01

## 2021-04-01 DIAGNOSIS — M53.3 COCCYX PAIN: Primary | ICD-10-CM

## 2021-04-01 PROCEDURE — 97140 MANUAL THERAPY 1/> REGIONS: CPT | Performed by: PHYSICAL THERAPIST

## 2021-04-01 PROCEDURE — 97110 THERAPEUTIC EXERCISES: CPT | Performed by: PHYSICAL THERAPIST

## 2021-04-01 NOTE — THERAPY TREATMENT NOTE
Outpatient Physical Therapy Pelvic Health Treatment Note  [facility]     Patient Name: Angélica Hooper  : 1990  MRN: [id number]  Today's Date: 2021        Visit Date: 2021   Visit Number:   Recheck: 21  Insurance:  [employer]        Visit Dx:    ICD-10-CM ICD-9-CM   1. Coccyx pain  M53.3 724.79       Patient Active Problem List   Diagnosis   • Supervision of normal pregnancy   • Heart palpitations        Pelvic Health     Row Name 21 1600             Pregnancy Questions    Number of Pregnancies  4  -SW      Number of Miscarriages  1  -SW      Number of Children  2  -SW      How many weeks pregnant are you?  29 weeks  -SW        User Key  (r) = Recorded By, (t) = Taken By, (c) = Cosigned By    Initials Name Provider Type    Pat Michel, PT DPT Physical Therapist        PT Ortho     Row Name 21 1600       Subjective Comments    Subjective Comments  Doing better overall.  Back locked a little last night in bed last night.    -SW       Subjective Pain    Able to rate subjective pain?  yes  -SW    Pre-Treatment Pain Level  0  -SW    Post-Treatment Pain Level  0  -SW       Posture/Observations    Posture/Observations Comments  Slight WBOS noted with gait.  Rapid sit to stand t/f without use of hands noted.   -SW       Lumbosacral Accessory Motions    PA glide- Sacral base  -- aligned  -SW    PA glide- Sacral apex  Bilateral pain  -SW    Innominate rotation  -- aligned  -SW       Lumbosacral Palpation    Ischial Tuberosity  -- sacrotuberous ligament ttp   -SW    Lumbosacral Palpation Comments  Alignment presented good this visit.  Tenderness  noted along sacrotuberous ligament bilaterally.  Coccygeus ttp R>L  -SW      User Key  (r) = Recorded By, (t) = Taken By, (c) = Cosigned By    Initials Name Provider Type    Pat Michel, PT DPT Physical Therapist                     PT Assessment/Plan     Row Name 21 1700          PT Assessment     Functional Limitations  Limitation in home management;Limitations in community activities;Performance in leisure activities;Performance in self-care ADL;Performance in work activities  -SW     Impairments  Impaired muscle length;Impaired postural alignment;Poor body mechanics;Pain;Muscle strength;Posture  -SW     Assessment Comments  Pt tolerated treatment well. Staying in good alignment but continues with tension to levator ani group, specifically pubococcygeus mucles R>L.  Sacrotuberous ligament taut bilaterally.  Compliant with HEP.  No changes to goals this date  -SW     Rehab Potential  Good  -SW     Patient/caregiver participated in establishment of treatment plan and goals  Yes  -SW     Patient would benefit from skilled therapy intervention  Yes  -SW        PT Plan    PT Frequency  -- FU in 2 weeks due to vacation of undersigned  -SW     PT Plan Comments  cont manual release as needed.  quadruped stab with TA/PF activation, prone opp arm/leg raises  -SW       User Key  (r) = Recorded By, (t) = Taken By, (c) = Cosigned By    Initials Name Provider Type    Pat Michel, PT DPT Physical Therapist            OP Exercises     Row Name 04/01/21 1600             Subjective Comments    Subjective Comments  Doing better overall.  Back locked a little last night in bed last night.    -SW         Subjective Pain    Able to rate subjective pain?  yes  -SW      Pre-Treatment Pain Level  0  -SW      Post-Treatment Pain Level  0  -SW         Exercise 1    Exercise Name 1  hamstring stretch   -SW      Reps 1  2  -SW      Time 1  30 sec  -SW         Exercise 2    Exercise Name 2  adductor stretch   -SW      Reps 2  2  -SW      Time 2  30 sec  -SW         Exercise 3    Exercise Name 3  piriformis stretch   -SW      Reps 3  3  -SW      Time 3  30 sec  -SW         Exercise 4    Exercise Name 4  prone TKE  -SW      Sets 4  2  -SW      Reps 4  10  -SW         Exercise 5    Exercise Name 5  prone TKE to hip extension  with brace holds  -      Sets 5  2  -      Reps 5  10  -        User Key  (r) = Recorded By, (t) = Taken By, (c) = Cosigned By    Initials Name Provider Type    Pat Michelle, PT DPT Physical Therapist           Manual Rx (last 36 hours)      Manual Treatments     Row Name 04/01/21 1700             Manual Rx 1    Manual Rx 1 Location  alignment assessment  -         Manual Rx 2    Manual Rx 2 Location  sacrotuberous ligament release bilaterally  -         Manual Rx 3    Manual Rx 3 Location  coccygeus release bilaterally   -        User Key  (r) = Recorded By, (t) = Taken By, (c) = Cosigned By    Initials Name Provider Type    Pat Michel Ander, PT DPT Physical Therapist                    PT OP Goals     Row Name 04/01/21 1600          PT Short Term Goals    STG Date to Achieve  03/23/21  -     STG 1  patient to be indepdent with HEp as assigned at least 2-3x daily.  -     STG 1 Progress  Met  -     STG 2  patient to demo and verbalize I with finding neutral position while in seated and standing postures.   -     STG 2 Progress  Met  -     STG 3  patient to show proper TA engagement with stabilization exercises while in seated and standing postures as to help maintain neutral spine with dynamic movements.   -     STG 3 Progress  Ongoing;Progressing  -     STG 4  patient to have symmetrical alignment to pelvic ring to support proper seated posture and balance.   -     STG 4 Progress  Met  -     STG 5  pain to dec to 3/10 at worse following work day that is easily managed with rest and / or change of positions.  -     STG 5 Progress  Met  -        Long Term Goals    LTG Date to Achieve  06/23/21  -     LTG 1  subjective improvement of 70%   -     LTG 1 Progress  Progressing  -     LTG 2  patient to not miss work days due to pain or discomfort and perform without restrictions.  -     LTG 2 Progress  Progressing  -     LTG 3  patient to tolerate inc seated  activity for at least 1 hour to allow for travel to [place] without rest break required due to pain.  -SW     LTG 3 Progress  New  -        Time Calculation    PT Goal Re-Cert Due Date  04/22/21  -       User Key  (r) = Recorded By, (t) = Taken By, (c) = Cosigned By    Initials Name Provider Type    Pat Michel, PT DPT Physical Therapist           Therapy Education  Given: HEP  Program: Reinforced, Progressed  How Provided: Verbal, Demonstration  Provided to: Patient  Level of Understanding: Teach back education performed, Verbalized, Demonstrated              Time Calculation:   Start Time: 1605  Stop Time: 1700  Time Calculation (min): 55 min  Therapy Charges for Today     Code Description Service Date Service Provider Modifiers Qty    42015459731 HC PT MANUAL THERAPY EA 15 MIN 4/1/2021 Pat Perez, PT DPT GP 2    02145947297 HC PT THER PROC EA 15 MIN 4/1/2021 Pat Perez, PT DPT GP 2                    Pat Perez PT DPT  4/1/2021

## 2021-04-12 ENCOUNTER — HOSPITAL ENCOUNTER (OUTPATIENT)
Dept: PHYSICAL THERAPY | Facility: HOSPITAL | Age: 31
Setting detail: THERAPIES SERIES
Discharge: HOME | End: 2021-04-12

## 2021-04-12 ENCOUNTER — TELEPHONE (OUTPATIENT)
Dept: OBSTETRICS AND GYNECOLOGY | Facility: CLINIC | Age: 31
End: 2021-04-12

## 2021-04-12 ENCOUNTER — ROUTINE PRENATAL (OUTPATIENT)
Dept: OBSTETRICS AND GYNECOLOGY | Facility: CLINIC | Age: 31
End: 2021-04-12

## 2021-04-12 VITALS — WEIGHT: 199.2 LBS | DIASTOLIC BLOOD PRESSURE: 70 MMHG | BODY MASS INDEX: 28.58 KG/M2 | SYSTOLIC BLOOD PRESSURE: 118 MMHG

## 2021-04-12 DIAGNOSIS — Z23 NEED FOR TDAP VACCINATION: ICD-10-CM

## 2021-04-12 DIAGNOSIS — M53.3 COCCYX PAIN: Primary | ICD-10-CM

## 2021-04-12 DIAGNOSIS — R00.2 HEART PALPITATIONS: ICD-10-CM

## 2021-04-12 DIAGNOSIS — Z3A.30 30 WEEKS GESTATION OF PREGNANCY: ICD-10-CM

## 2021-04-12 DIAGNOSIS — Z34.83 ENCOUNTER FOR SUPERVISION OF OTHER NORMAL PREGNANCY IN THIRD TRIMESTER: Primary | ICD-10-CM

## 2021-04-12 PROCEDURE — 90471 IMMUNIZATION ADMIN: CPT | Performed by: OBSTETRICS & GYNECOLOGY

## 2021-04-12 PROCEDURE — 0502F SUBSEQUENT PRENATAL CARE: CPT | Performed by: OBSTETRICS & GYNECOLOGY

## 2021-04-12 PROCEDURE — 90715 TDAP VACCINE 7 YRS/> IM: CPT | Performed by: OBSTETRICS & GYNECOLOGY

## 2021-04-12 PROCEDURE — 97140 MANUAL THERAPY 1/> REGIONS: CPT | Performed by: PHYSICAL THERAPIST

## 2021-04-12 PROCEDURE — 97110 THERAPEUTIC EXERCISES: CPT | Performed by: PHYSICAL THERAPIST

## 2021-04-12 NOTE — PROGRESS NOTES
CC: Prenatal visit    Angélica Hooper is a 30 y.o.  at 30w1d.  Doing well.  Denies contractions, LOF, or VB.  Reports good FM.    /70   Wt 90.4 kg (199 lb 3.2 oz)   LMP 2020 (Exact Date)   BMI 28.58 kg/m²   Fundal Height (cm): 33 cm  Fetal Heart Rate: 127     Problems (from 20 to present)     Problem Noted Resolved    Supervision of normal pregnancy 2020 by Usha Villaseñor, NEO No    Heart palpitations 2020 by Usha Villaseñor, NEO No        A/P: Angélica Hooper is a 30 y.o.  at 30w1d.  - RTC in 2 weeks  - Tdap today  - Information for lactation consultant given  - Reviewed COVID-19 visitation policy  - Reviewed COVID-19 precautions     Diagnosis Plan   1. Encounter for supervision of other normal pregnancy in third trimester     2. Heart palpitations     3. Need for Tdap vaccination  Tdap Vaccine Greater Than or Equal To 8yo IM   4. 30 weeks gestation of pregnancy       Lisa Ramos MD  2021  15:40 CDT

## 2021-04-12 NOTE — THERAPY TREATMENT NOTE
Outpatient Physical Therapy Pelvic Health Treatment Note  [facility]     Patient Name: Angélica Hooper  : 1990  MRN: [id number]  Today's Date: 2021        Visit Date: 2021  Visit Number:   Recheck: 21  Insurance: [employer]    Visit Dx:    ICD-10-CM ICD-9-CM   1. Coccyx pain  M53.3 724.79       Patient Active Problem List   Diagnosis   • Supervision of normal pregnancy   • Heart palpitations        Pelvic Health     Row Name 21 1600             Pregnancy Questions    Number of Pregnancies  4  -SW      Number of Miscarriages  1  -SW      Number of Children  2  -SW      How many weeks pregnant are you?  31 weeks  -SW        User Key  (r) = Recorded By, (t) = Taken By, (c) = Cosigned By    Initials Name Provider Type    Pat Michel, PT DPT Physical Therapist        PT Ortho     Row Name 21 1600       Subjective Comments    Subjective Comments  Hurting more than usual today.  LB and tailbone with most of pain.  Had to work today. Started getting bad couple days ago.  Hurting also in the front of pubic bone.  She went camping last week and slept on air mattress which may have contributed.  Pt reports fetus stays balled up along R side up into rib cage which also may be the cause.   -SW       Subjective Pain    Able to rate subjective pain?  yes  -SW    Pre-Treatment Pain Level  6  -SW    Post-Treatment Pain Level  6 sore  -SW       Posture/Observations    Posture/Observations Comments  slow gait with guarded posture. Appearance of some distress with sit to stand t/f.  Gait cont with WBOS but steady.   -SW       SI/Hip Screen- Lower Quarter Clearing    ASIS compression  Positive  -SW    Pain in Denisha's area  Bilateral:;Positive seems more R side  -SW       Lumbosacral Accessory Motions    PA glide- Sacral base  Bilateral pain painful AP glide  -SW    PA glide- Sacral apex  Bilateral pain  -SW    Innominate rotation  -- aligned continues  -SW       Lumbar/SI  Special Tests    SI Compression Test (SI Dysfunction)  Positive  -SW    Sacral Spring Test (SI Dysfunction)  Bilateral:;Positive  -SW    Lumbar/SI Special Tests Comments  Patient presented with more aggravated SI symptoms this date though alignment remains symmetrical.   -SW       Lumbosacral Palpation    SI  Right:;Tender;Guarded/taut  -SW    Lumbosacral Segment  Right:;Tender;Guarded/taut;Trigger point L3/4  -SW    Ischial Tuberosity  -- sacrotuberous ligament ttp   -SW    Lumbosacral Palpation Comments  SI and lower lumbar with inc complaints this date.  Trigger noted at L3.4 on R side.  Fetus hard and palpable along R lower rib/abdomen.    -      User Key  (r) = Recorded By, (t) = Taken By, (c) = Cosigned By    Initials Name Provider Type    aPt Michel, PT DPT Physical Therapist                     PT Assessment/Plan     Row Name 04/12/21 1600          PT Assessment    Functional Limitations  Limitation in home management;Limitations in community activities;Performance in leisure activities;Performance in self-care ADL;Performance in work activities  -     Impairments  Impaired muscle length;Impaired postural alignment;Poor body mechanics;Pain;Muscle strength;Posture  -     Assessment Comments  Patient's pain elevated this date.  Unsure the cause unless due to camping events with sleeping on air mattress.  Otherwise, no new activities.  Trigger noted along L3.4 on R side with inc complaints of palpation to this region.  Manual therapy helped with resolution of trigger but patient remained very sore post manual work.  Anticipate 15 minutes of ice will improve pain and overall function. Pt to complete at home.  No advancements of exercise this date due to inc c/o pain.   -     Rehab Potential  Good  -     Patient/caregiver participated in establishment of treatment plan and goals  Yes  -SW     Patient would benefit from skilled therapy intervention  Yes  -SW        PT Plan    PT Frequency   1x/week  -SW     PT Plan Comments  cont manual.  Assess trigger L3/4.  Add quadruped if able.   -SW       User Key  (r) = Recorded By, (t) = Taken By, (c) = Cosigned By    Initials Name Provider Type    Pat Michel, PT DPT Physical Therapist            OP Exercises     Row Name 04/12/21 1600             Subjective Comments    Subjective Comments  Hurting more than usual today.  LB and tailbone with most of pain.  Had to work today. Started getting bad couple days ago.  Hurting also in the front of pubic bone.  She went camping last week and slept on air mattress which may have contributed.  Pt reports fetus stays balled up along R side up into rib cage which also may be the cause.   -SW         Subjective Pain    Able to rate subjective pain?  yes  -SW      Pre-Treatment Pain Level  6  -SW      Post-Treatment Pain Level  6 sore  -SW         Exercise 1    Exercise Name 1  hamstring stretch   -SW      Reps 1  2  -SW      Time 1  30 sec  -SW         Exercise 2    Exercise Name 2  adductor stretch   -SW      Reps 2  2  -SW      Time 2  30 sec  -SW         Exercise 3    Exercise Name 3  piriformis stertch   -SW      Reps 3  2  -SW      Time 3  30 sec  -SW        User Key  (r) = Recorded By, (t) = Taken By, (c) = Cosigned By    Initials Name Provider Type    Pat Michel, PT DPT Physical Therapist           Manual Rx (last 36 hours)      Manual Treatments     Row Name 04/12/21 1700             Manual Rx 1    Manual Rx 1 Location  alignment assessment  -SW         Manual Rx 2    Manual Rx 2 Location  sacral float  -SW         Manual Rx 3    Manual Rx 3 Location  AP glides to sacrum  -SW         Manual Rx 4    Manual Rx 4 Location  L3/4 trigger release  -SW         Manual Rx 5    Manual Rx 5 Location  strain/counterstrain L3/4  -SW         Manual Rx 6    Manual Rx 6 Location  lumbosacral fascial release  -SW        User Key  (r) = Recorded By, (t) = Taken By, (c) = Cosigned By    Initials Name Provider  Type    Pat Michel, PT DPT Physical Therapist                    PT OP Goals     Row Name 04/12/21 1600          PT Short Term Goals    STG Date to Achieve  03/23/21  -     STG 1  patient to be indepdent with HEp as assigned at least 2-3x daily.  -     STG 1 Progress  Met  -     STG 2  patient to demo and verbalize I with finding neutral position while in seated and standing postures.   -     STG 2 Progress  Met  -     STG 3  patient to show proper TA engagement with stabilization exercises while in seated and standing postures as to help maintain neutral spine with dynamic movements.   -     STG 3 Progress  Ongoing;Progressing  -     STG 4  patient to have symmetrical alignment to pelvic ring to support proper seated posture and balance.   -     STG 4 Progress  Met  -     STG 5  pain to dec to 3/10 at worse following work day that is easily managed with rest and / or change of positions.  -     STG 5 Progress  Met  -        Long Term Goals    LTG Date to Achieve  06/23/21  -     LTG 1  subjective improvement of 70%   -     LTG 1 Progress  Progressing  -     LTG 2  patient to not miss work days due to pain or discomfort and perform without restrictions.  -     LTG 2 Progress  Progressing  -     LTG 3  patient to tolerate inc seated activity for at least 1 hour to allow for travel to [place] without rest break required due to pain.  -     LTG 3 Progress  New  -        Time Calculation    PT Goal Re-Cert Due Date  04/22/21  -       User Key  (r) = Recorded By, (t) = Taken By, (c) = Cosigned By    Initials Name Provider Type    Pat Michel, PT DPT Physical Therapist           Therapy Education  Given: HEP, Symptoms/condition management  Program: Reinforced  How Provided: Verbal, Demonstration  Provided to: Patient  Level of Understanding: Teach back education performed, Verbalized, Demonstrated              Time Calculation:   Start Time: 1602  Stop Time:  1703  Time Calculation (min): 61 min  Therapy Charges for Today     Code Description Service Date Service Provider Modifiers Qty    38530811462 HC PT THER PROC EA 15 MIN 4/12/2021 Pat Perez, PT DPT GP 1    38539462491 HC PT MANUAL THERAPY EA 15 MIN 4/12/2021 Pat Perez, PT DPT GP 3                    Pat Perez, PT DPT  4/12/2021

## 2021-04-12 NOTE — TELEPHONE ENCOUNTER
She has had issues with lactation x2 and interested in breastfeeding.  I gave her a pamphlet with your info.    She states that she already has a breast pump.  I didn't know if you wanted me to send in a new one or not.  Just let me know and Ii'm happy to send one in or if you want to to do it that is fine.    Thanks!    Lisa Ramos MD  4/12/2021  17:32 CDT

## 2021-04-14 ENCOUNTER — LAB (OUTPATIENT)
Dept: LAB | Facility: HOSPITAL | Age: 31
End: 2021-04-14

## 2021-04-14 DIAGNOSIS — Z34.82 ENCOUNTER FOR SUPERVISION OF OTHER NORMAL PREGNANCY IN SECOND TRIMESTER: ICD-10-CM

## 2021-04-14 LAB
DEPRECATED RDW RBC AUTO: 44.8 FL (ref 37–54)
ERYTHROCYTE [DISTWIDTH] IN BLOOD BY AUTOMATED COUNT: 13.7 % (ref 12.3–15.4)
GLUCOSE 1H P 100 G GLC PO SERPL-MCNC: 110 MG/DL (ref 60–140)
HCT VFR BLD AUTO: 36.3 % (ref 34–46.6)
HGB BLD-MCNC: 12.8 G/DL (ref 12–15.9)
MCH RBC QN AUTO: 31.8 PG (ref 26.6–33)
MCHC RBC AUTO-ENTMCNC: 35.3 G/DL (ref 31.5–35.7)
MCV RBC AUTO: 90.3 FL (ref 79–97)
PLATELET # BLD AUTO: 213 10*3/MM3 (ref 140–450)
PMV BLD AUTO: 10.9 FL (ref 6–12)
RBC # BLD AUTO: 4.02 10*6/MM3 (ref 3.77–5.28)
WBC # BLD AUTO: 6.83 10*3/MM3 (ref 3.4–10.8)

## 2021-04-14 PROCEDURE — 36415 COLL VENOUS BLD VENIPUNCTURE: CPT

## 2021-04-14 PROCEDURE — 85027 COMPLETE CBC AUTOMATED: CPT

## 2021-04-14 PROCEDURE — 82950 GLUCOSE TEST: CPT

## 2021-04-15 ENCOUNTER — TELEPHONE (OUTPATIENT)
Dept: OBSTETRICS AND GYNECOLOGY | Facility: CLINIC | Age: 31
End: 2021-04-15

## 2021-04-15 NOTE — TELEPHONE ENCOUNTER
----- Message from Lisa Ramos MD sent at 4/15/2021  6:40 AM CDT -----  Please let her know that she is not anemic and her glucola is normal.

## 2021-04-15 NOTE — TELEPHONE ENCOUNTER
Called and spoke with patient regarding results.  Let her know that she was not anemic and passed her 1 hour glucola. Also let patient know that I have faxed in rx for breast pump.  Patient verbalized understanding

## 2021-04-19 ENCOUNTER — HOSPITAL ENCOUNTER (OUTPATIENT)
Dept: PHYSICAL THERAPY | Facility: HOSPITAL | Age: 31
Setting detail: THERAPIES SERIES
Discharge: HOME | End: 2021-04-19

## 2021-04-19 DIAGNOSIS — M53.3 COCCYX PAIN: Primary | ICD-10-CM

## 2021-04-19 PROCEDURE — 97140 MANUAL THERAPY 1/> REGIONS: CPT | Performed by: PHYSICAL THERAPIST

## 2021-04-19 PROCEDURE — 97110 THERAPEUTIC EXERCISES: CPT | Performed by: PHYSICAL THERAPIST

## 2021-04-19 NOTE — THERAPY TREATMENT NOTE
Outpatient Physical Therapy Pelvic Health Treatment Note  [facility]     Patient Name: Angélica Hooper  : 1990  MRN: [id number]  Today's Date: 2021        Visit Date: 2021   Visit number:   Recheck: 21  Insurance: [employer]      Visit Dx:    ICD-10-CM ICD-9-CM   1. Coccyx pain  M53.3 724.79       Patient Active Problem List   Diagnosis   • Supervision of normal pregnancy   • Heart palpitations        Pelvic Health     Row Name 21 1600             Pregnancy Questions    Number of Pregnancies  4  -SW      Number of Miscarriages  1  -SW      Number of Children  2  -SW      How many weeks pregnant are you?  32 weeks  -SW        User Key  (r) = Recorded By, (t) = Taken By, (c) = Cosigned By    Initials Name Provider Type    Pat Michel, PT DPT Physical Therapist        PT Ortho     Row Name 21 1600       Subjective Comments    Subjective Comments  I am better than last week.  My back had a catch today when going from sit to stand and noted catch on L side.  Otherwise tailbone is doing ok.    -SW       Subjective Pain    Able to rate subjective pain?  yes  -SW    Pre-Treatment Pain Level  0  -SW    Post-Treatment Pain Level  0  -SW       Posture/Observations    Posture/Observations Comments  improved sit to stands this visit.  Standing posture with resting on ant ligaments with slight ER noted at hips. Alignment while supine symmetrical.   -SW       Lumbosacral Accessory Motions    PA glide- Sacral apex  Right pain  -SW    Innominate rotation  -- level  -SW       Lumbar/SI Special Tests    Lumbar/SI Special Tests Comments  Alignment symmetrical.  Tender along coccygeus muscle and apex of sacrum.  Post greater trochanter also tender with trigger noted.    -SW       Lumbosacral Palpation    Piriformis  Right:;Guarded/taut;Trigger point  -SW    Lumbosacral Palpation Comments  Improved trigger to L3/4 of lumbar spine, but noted in piriformis of R gluteal.   Active trigger point noted.   -      User Key  (r) = Recorded By, (t) = Taken By, (c) = Cosigned By    Initials Name Provider Type    Pat Michel PT DPT Physical Therapist                     PT Assessment/Plan     Row Name 04/19/21 1600          PT Assessment    Functional Limitations  Limitation in home management;Limitations in community activities;Performance in leisure activities;Performance in self-care ADL;Performance in work activities  -     Impairments  Impaired muscle length;Impaired postural alignment;Poor body mechanics;Pain;Muscle strength;Posture  -     Assessment Comments  Patient with some difficulty in pelvic floor recruitment for stability. Improved with verbalized cues.   She acknowledged that contraction to PF caused inc pain in tailbone region. Wtih PF recruitment, she had inc pain along coccyx region. Pain centralized along piriformis region of R side.  Improved with strain counterstrain. Encouraged pt with postural position with standing, without ER of hips and more neutral to slightly rotated inward position to hips to dec tension across piriformis region. All but one STG met at this time.  Working toward LTG progression.   -     Rehab Potential  Good  -     Patient/caregiver participated in establishment of treatment plan and goals  Yes  -     Patient would benefit from skilled therapy intervention  Yes  -        PT Plan    PT Frequency  1x/week  -     PT Plan Comments  reassess piriformis on R along post trochanter.   -       User Key  (r) = Recorded By, (t) = Taken By, (c) = Cosigned By    Initials Name Provider Type    Pat Michel PT DPT Physical Therapist            OP Exercises     Row Name 04/19/21 1600             Subjective Comments    Subjective Comments  I am better than last week.  My back had a catch today when going from sit to stand and noted catch on L side.  Otherwise tailbone is doing ok.    -         Subjective Pain    Able to  rate subjective pain?  yes  -SW      Pre-Treatment Pain Level  0  -SW      Post-Treatment Pain Level  0  -SW         Exercise 1    Exercise Name 1  physioball tilts lateral/ankle   -SW      Time 1  1.5 min each.   -SW         Exercise 2    Exercise Name 2  physioball circles   -SW      Time 2  1.5 min each   -SW         Exercise 3    Exercise Name 3  physioball sit at neutral   -SW      Additional Comments  pt able to find neutral position with seated posture.   -SW         Exercise 4    Exercise Name 4  diaphragmatic breathing with Pf support while seated physioball  -SW      Additional Comments  able to coordinate breath support with PF elevation and TA engagement, but with verbalized support and demonstration   -SW         Exercise 5    Exercise Name 5  piriformis stretch   -SW      Reps 5  3  -SW      Time 5  30 sec  -SW         Exercise 6    Exercise Name 6  hamstring stretch   -SW      Reps 6  3  -SW      Time 6  30 sec  -SW         Exercise 7    Exercise Name 7  adductor stretch   -SW      Reps 7  3  -SW      Time 7  30 sec  -SW         Exercise 8    Exercise Name 8  prone knee extension   -SW      Reps 8  15  -SW        User Key  (r) = Recorded By, (t) = Taken By, (c) = Cosigned By    Initials Name Provider Type    SW Pat Perez, PT DPT Physical Therapist           Manual Rx (last 36 hours)      Manual Treatments     Row Name 04/19/21 1700             Manual Rx 1    Manual Rx 1 Location  lumbosacral fascial release  -SW         Manual Rx 2    Manual Rx 2 Location  sacral float  -SW         Manual Rx 3    Manual Rx 3 Location  pifiriformis trigger release R   -SW      Manual Rx 3 Type  strain/counterstrain   -SW        User Key  (r) = Recorded By, (t) = Taken By, (c) = Cosigned By    Initials Name Provider Type     Pat Perez, PT DPT Physical Therapist                    PT OP Goals     Row Name 04/19/21 1600          PT Short Term Goals    STG 1  patient to be indepdent with HEp as  assigned at least 2-3x daily.  -     STG 1 Progress  Met  -     STG 2  patient to demo and verbalize I with finding neutral position while in seated and standing postures.   -     STG 2 Progress  Met  -     STG 3  patient to show proper TA engagement with stabilization exercises while in seated and standing postures as to help maintain neutral spine with dynamic movements.   -     STG 3 Progress  Ongoing;Progressing  -     STG 3 Progress Comments  struggled with recruitment for stabilization using proper breath support.   -     STG 4  patient to have symmetrical alignment to pelvic ring to support proper seated posture and balance.   -     STG 4 Progress  Met  -[facility] 5  pain to dec to 3/10 at worse following work day that is easily managed with rest and / or change of positions.  -[facility] 5 Progress  Met  -        Long Term Goals    LTG Date to Achieve  06/23/21  -     LTG 1  subjective improvement of 70%   -     LTG 1 Progress  Progressing  -     LTG 2  patient to not miss work days due to pain or discomfort and perform without restrictions.  -     LTG 2 Progress  Progressing  -     LTG 3  patient to tolerate inc seated activity for at least 1 hour to allow for travel to [place] without rest break required due to pain.  -     LTG 3 Progress  Progressing  -        Time Calculation    PT Goal Re-Cert Due Date  04/22/21  -       User Key  (r) = Recorded By, (t) = Taken By, (c) = Cosigned By    Initials Name Provider Type    Pat Michel, PT DPT Physical Therapist           Therapy Education  Given: HEP  Program: Reinforced  How Provided: Verbal, Demonstration  Provided to: Patient  Level of Understanding: Teach back education performed, Verbalized, Demonstrated              Time Calculation:   Start Time: 1600  Stop Time: 1703  Time Calculation (min): 63 min  Therapy Charges for Today     Code Description Service Date Service Provider Modifiers Qty    27348665972  HC PT MANUAL THERAPY EA 15 MIN 4/19/2021 Pat Perez, PT DPT GP 2    89839467543 HC PT THER PROC EA 15 MIN 4/19/2021 Pat Perez, PT DPT GP 2                    Pat Perez, PT DPT  4/19/2021

## 2021-04-26 ENCOUNTER — ROUTINE PRENATAL (OUTPATIENT)
Dept: OBSTETRICS AND GYNECOLOGY | Facility: CLINIC | Age: 31
End: 2021-04-26

## 2021-04-26 VITALS — DIASTOLIC BLOOD PRESSURE: 70 MMHG | BODY MASS INDEX: 29.47 KG/M2 | WEIGHT: 205.4 LBS | SYSTOLIC BLOOD PRESSURE: 114 MMHG

## 2021-04-26 DIAGNOSIS — Z3A.32 32 WEEKS GESTATION OF PREGNANCY: ICD-10-CM

## 2021-04-26 DIAGNOSIS — R00.2 HEART PALPITATIONS: ICD-10-CM

## 2021-04-26 DIAGNOSIS — Z34.83 ENCOUNTER FOR SUPERVISION OF OTHER NORMAL PREGNANCY IN THIRD TRIMESTER: Primary | ICD-10-CM

## 2021-04-26 PROCEDURE — 0502F SUBSEQUENT PRENATAL CARE: CPT | Performed by: OBSTETRICS & GYNECOLOGY

## 2021-04-26 NOTE — PROGRESS NOTES
CC: Prenatal visit    Angélica Hooper is a 30 y.o.  at 32w1d.  Doing well.  Denies contractions, LOF, or VB.  Reports good FM.    /70   Wt 93.2 kg (205 lb 6.4 oz)   LMP 2020 (Exact Date)   BMI 29.47 kg/m²   Fundal Height (cm): 33 cm  Fetal Heart Rate: 137   Leopold's: vertex     Problems (from 20 to present)     Problem Noted Resolved    Supervision of normal pregnancy 2020 by Usha Villaseñor APRN No    Heart palpitations 2020 by Usha Villaseñor APRN No        A/P: Angélica Hooper is a 30 y.o.  at 32w1d.  - RTC in 2 weeks  - Reviewed COVID-19 visitation policy  - Reviewed COVID-19 precautions     Diagnosis Plan   1. Encounter for supervision of other normal pregnancy in third trimester     2. Heart palpitations     3. 32 weeks gestation of pregnancy       Lisa Ramos MD  2021  16:11 CDT

## 2021-04-27 ENCOUNTER — HOSPITAL ENCOUNTER (OUTPATIENT)
Dept: PHYSICAL THERAPY | Facility: HOSPITAL | Age: 31
Setting detail: THERAPIES SERIES
Discharge: HOME | End: 2021-04-27

## 2021-04-27 DIAGNOSIS — M53.3 COCCYX PAIN: Primary | ICD-10-CM

## 2021-04-27 PROCEDURE — 97140 MANUAL THERAPY 1/> REGIONS: CPT | Performed by: PHYSICAL THERAPIST

## 2021-04-27 PROCEDURE — 97110 THERAPEUTIC EXERCISES: CPT | Performed by: PHYSICAL THERAPIST

## 2021-04-27 NOTE — THERAPY RE-EVALUATION
Outpatient Physical Therapy Pelvic Health Progress Note  [facility]     Patient Name: Angélica Hooper  : 1990  MRN: [id number]  Today's Date: 2021        Visit Date: 2021   Visit number:   Recheck: 21  Insurance: [employer]  Improvements: 60%    Visit Dx:    ICD-10-CM ICD-9-CM   1. Coccyx pain  M53.3 724.79       Patient Active Problem List   Diagnosis   • Supervision of normal pregnancy   • Heart palpitations        Pelvic Health     Row Name 21 1600             Pregnancy Questions    Number of Pregnancies  4  -SW      Number of Miscarriages  1  -SW      Number of Children  2  -SW      How many weeks pregnant are you?  33 weeks  -SW        User Key  (r) = Recorded By, (t) = Taken By, (c) = Cosigned By    Initials Name Provider Type    Pat Michel, PT DPT Physical Therapist        PT Ortho     Row Name 21 1600       Subjective Comments    Subjective Comments  Catching on L side this week. Now seems to be opposite side.  Gained 6 lbs in 2 weeks and miserable.  Noticed hips hurt a lot too.  I don't walk with toe outward.  Positioned more forward position.  I   -SW       Subjective Pain    Able to rate subjective pain?  yes  -SW    Post-Treatment Pain Level  0 sore  -SW       Posture/Observations    Posture/Observations Comments  slow to gait this date.  Standing in ant tilt with toes forward facing.  Alignment ant rotated on R side. Shortened on L .   -SW       Neural Tension Signs- Lower Quarter Clearing    Slump  Negative  -SW    SLR  Negative  -SW       Sensory Screen for Light Touch- Lower Quarter Clearing    L1 (inguinal area)  Intact  -SW    L2 (anterior mid thigh)  Intact  -SW    L3 (distal anterior thigh)  Intact  -SW    L4 (medial lower leg/foot)  Intact  -SW    L5 (lateral lower leg/great toe)  Intact  -SW    S1 (bottom of foot)  Intact  -SW       Myotomal Screen- Lower Quarter Clearing    Hip flexion (L2)  5 (Normal)  -SW    Knee extension  (L3)  5 (Normal)  -SW    Ankle DF (L4)  WNL  -SW    Great toe extension (L5)  WNL  -SW    Ankle PF (S1)  WNL  -SW    Knee flexion (S2)  5 (Normal)  -SW       Lumbar ROM Screen- Lower Quarter Clearing    Lumbar Flexion  Normal  -SW    Lumbar Extension  Normal  -SW    Lumbar Lateral Flexion  Normal  -SW    Lumbar Rotation  Normal  -SW       SI/Hip Screen- Lower Quarter Clearing    ASIS compression  Negative  -SW    ASIS distraction  Negative  -SW    Mary's/Oswaldo's test  Negative  -SW    Posterior thigh sheer  Negative  -SW    Pain in Denisha's area  Left:;Positive  -SW       Lumbosacral Accessory Motions    Lumbosacral Accessory Motions Tested?  Yes  -SW    PA glide- Sacral base  -- aligned  -SW    Innominate rotation  -- ant rotation on R   -SW       Lumbar/SI Special Tests    SI Compression Test (SI Dysfunction)  Negative  -SW    Sacral Spring Test (SI Dysfunction)  Left:;Positive  -SW    Lumbar/SI Special Tests Comments  Patient's pain mostly toward the L side today. Alignment slightly altered with R ant innom.    -SW       Lumbosacral Palpation    Piriformis  Left:;Guarded/taut tension at distal attachment.  -SW    Quadratus Lumborum  Left:;Tender;Guarded/taut  -SW    Sacrotubrous Ligament  Left:;Tender;Guarded/taut  -SW    Greater Tuberosity  -- posterior lateral trochanter with ttp and dec fascial glide  -SW    Lumbosacral Palpation Comments  Most of complaints were post trochanter this date.  Tension along distal piriformis and sacrotuberous ligament.  Fascial glide reduced all directions. No evidence of LB triggers noted this date.  Sacrum with good alignment.   -SW       General ROM    GENERAL ROM COMMENTS  functional without restriction  -SW       MMT (Manual Muscle Testing)    General MMT Comments  5/5 throughout  -SW      User Key  (r) = Recorded By, (t) = Taken By, (c) = Cosigned By    Initials Name Provider Type    Pat Michel, PT DPT Physical Therapist                     PT Assessment/Plan      Row Name 04/27/21 1600          PT Assessment    Functional Limitations  Limitation in home management;Limitations in community activities;Performance in leisure activities;Performance in self-care ADL;Performance in work activities  -     Impairments  Impaired muscle length;Impaired postural alignment;Pain;Posture  -SW     Assessment Comments  Patient's s/s cont to wax/wane and alternate in sides of R/L.  Today majority of pain on L side post hip.  Alignment altered again this date with ant rotation on R side.  Thickened fascia along post lateral trochanter   -SW     Rehab Potential  Good  -SW     Patient/caregiver participated in establishment of treatment plan and goals  Yes  -SW     Patient would benefit from skilled therapy intervention  Yes  -SW        PT Plan    PT Frequency  1x/week  -SW     PT Plan Comments  Cont manual work as necessary.  Begin IR/ER with TB in prone. opposite arm/leg raises.   -       User Key  (r) = Recorded By, (t) = Taken By, (c) = Cosigned By    Initials Name Provider Type    Pat Michel, PT DPT Physical Therapist            OP Exercises     Row Name 04/28/21 0945 04/27/21 1600          Subjective Comments    Subjective Comments  --  Catching on L side this week. Now seems to be opposite side.  Gained 6 lbs in 2 weeks and miserable.  Noticed hips hurt a lot too.  I don't walk with toe outward.  Positioned more forward position.  I   -SW        Subjective Pain    Able to rate subjective pain?  --  yes  -SW     Pre-Treatment Pain Level  --  0  -SW     Post-Treatment Pain Level  --  0 sore  -SW        Total Minutes    72240 - PT Therapeutic Exercise Minutes  27  -SW  --     58933 - PT Manual Therapy Minutes  30  -SW  --        Exercise 1    Exercise Name 1  --  hamstring stretch   -SW     Reps 1  --  3  -SW     Time 1  --  30 sec  -SW        Exercise 2    Exercise Name 2  --  adductor stretch   -SW     Reps 2  --  3  -SW     Time 2  --  30 sec  -SW        Exercise 3     Exercise Name 3  --  piriformis stretch   -SW     Reps 3  --  3  -SW     Time 3  --  30 sec  -SW        Exercise 4    Exercise Name 4  --  physioball tilts ant/post  -SW     Time 4  --  2' min   -SW        Exercise 5    Exercise Name 5  --  physioball lateral   -SW     Time 5  --  2'min  -SW        Exercise 6    Exercise Name 6  --  physioball circles   -SW     Time 6  --  2'min   -SW        Exercise 7    Exercise Name 7  --  prone TKE with brace  -SW     Reps 7  --  20  -SW        Exercise 8    Exercise Name 8  --  prone IR/ER AA  -SW     Reps 8  --  10  -SW       User Key  (r) = Recorded By, (t) = Taken By, (c) = Cosigned By    Initials Name Provider Type     Pat Perez, PT DPT Physical Therapist           Manual Rx (last 36 hours)      Manual Treatments     Row Name 04/28/21 0945 04/28/21 0900          Total Minutes    58613 - PT Manual Therapy Minutes  30  -SW  --        Manual Rx 1    Manual Rx 1 Location  --  strain/counterstrain to L piriformis/trochanter  -SW        Manual Rx 2    Manual Rx 2 Location  --  deep tissue work to sacrotuberous ligament  -SW        Manual Rx 3    Manual Rx 3 Location  --  deep tissue work to post lateral trochanter  -SW        Manual Rx 4    Manual Rx 4 Location  --  R ant innom MET  -SW       User Key  (r) = Recorded By, (t) = Taken By, (c) = Cosigned By    Initials Name Provider Type     Pat Perez, PT DPT Physical Therapist                    PT OP Goals     Row Name 04/27/21 1600          PT Short Term Goals    STG 1  patient to be indepdent with HEp as assigned at least 2-3x daily.  -     STG 1 Progress  Met  -     STG 2  patient to demo and verbalize I with finding neutral position while in seated and standing postures.   -     STG 2 Progress  Met  -     STG 3  patient to show proper TA engagement with stabilization exercises while in seated and standing postures as to help maintain neutral spine with dynamic movements.   -     STG 3 Progress   Ongoing;Progressing  -     STG 4  patient to have symmetrical alignment to pelvic ring to support proper seated posture and balance.   -     STG 4 Progress  Met  -     STG 5  pain to dec to 3/10 at worse following work day that is easily managed with rest and / or change of positions.  -     STG 5 Progress  Met  -        Long Term Goals    LTG Date to Achieve  06/23/21  -     LTG 1  subjective improvement of 70%   -     LTG 1 Progress  Progressing  -     LTG 2  patient to not miss work days due to pain or discomfort and perform without restrictions.  -     LTG 2 Progress  Progressing  -     LTG 3  patient to tolerate inc seated activity for at least 1 hour to allow for travel to [place] without rest break required due to pain.  -     LTG 3 Progress  Progressing  -        Time Calculation    PT Goal Re-Cert Due Date  05/27/21  -       User Key  (r) = Recorded By, (t) = Taken By, (c) = Cosigned By    Initials Name Provider Type    Pat Michel, PT DPT Physical Therapist           Therapy Education  Given: HEP  Program: Reinforced  How Provided: Verbal, Demonstration  Provided to: Patient  Level of Understanding: Teach back education performed, Verbalized, Demonstrated              Time Calculation:   Start Time: 1610  Stop Time: 1713  Time Calculation (min): 63 min  Time Calculation- PT  Start Time: 1610  Stop Time: 1713  Time Calculation (min): 63 min  PT Goal Re-Cert Due Date: 05/27/21  Timed Charges  27381 - PT Therapeutic Exercise Minutes: 27  45865 - PT Manual Therapy Minutes: 30  Total Minutes  Timed Charges Total Minutes: 57   Total Minutes: 57  Therapy Charges for Today     Code Description Service Date Service Provider Modifiers Qty    23515761147 HC PT THER PROC EA 15 MIN 4/27/2021 Pat Perez, PT DPT GP 2    09728840185 HC PT MANUAL THERAPY EA 15 MIN 4/27/2021 Pat Perez PT DPT GP 2                    Pat Perez PT DPT  4/28/2021

## 2021-05-06 ENCOUNTER — HOSPITAL ENCOUNTER (OUTPATIENT)
Dept: PHYSICAL THERAPY | Facility: HOSPITAL | Age: 31
Setting detail: THERAPIES SERIES
Discharge: HOME | End: 2021-05-06

## 2021-05-06 DIAGNOSIS — M53.3 COCCYX PAIN: Primary | ICD-10-CM

## 2021-05-06 PROCEDURE — 97110 THERAPEUTIC EXERCISES: CPT | Performed by: PHYSICAL THERAPIST

## 2021-05-06 PROCEDURE — 97140 MANUAL THERAPY 1/> REGIONS: CPT | Performed by: PHYSICAL THERAPIST

## 2021-05-06 NOTE — THERAPY TREATMENT NOTE
Outpatient Physical Therapy Pelvic Health Treatment Note  [facility]     Patient Name: Angélica Hooper  : 1990  MRN: [id number]  Today's Date: 2021        Visit Date: 2021   Visit number: 10/10  Recheck: 21  Insurance: [employer]  Improvements: 60-70%      Visit Dx:    ICD-10-CM ICD-9-CM   1. Coccyx pain  M53.3 724.79       Patient Active Problem List   Diagnosis   • Supervision of normal pregnancy   • Heart palpitations        Pelvic Health     Row Name 21 1500             Pregnancy Questions    Number of Pregnancies  4  -SW      Number of Miscarriages  1  -SW      Number of Children  2  -SW      How many weeks pregnant are you?  34 weeks  -SW        User Key  (r) = Recorded By, (t) = Taken By, (c) = Cosigned By    Initials Name Provider Type    Pat Michel, PT DPT Physical Therapist        PT Ortho     Row Name 21 1500       Subjective Comments    Subjective Comments  Patient feeling ok. 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  No new complaints.  Chiro worked on alignment.  Hurting more in ant front part of hip today.  Not sure why.   -SW       Subjective Pain    Able to rate subjective pain?  yes  -SW    Pre-Treatment Pain Level  2  -SW    Post-Treatment Pain Level  0  -SW       Posture/Observations    Posture/Observations Comments  Improved gait speed this visit.  Seated posture with upright positioning.  Good with stretching program without cues for performance. Alignment level in supine. Transitions without assist.   -SW       Neural Tension Signs- Lower Quarter Clearing    Slump  Negative  -SW    SLR  Negative  -SW       Sensory Screen for Light Touch- Lower Quarter Clearing    L1 (inguinal area)  Intact  -SW    L2 (anterior mid thigh)  Intact  -SW    L3 (distal anterior thigh)  Intact  -SW    L4 (medial lower leg/foot)  Intact  -SW    L5 (lateral lower leg/great toe)  Intact  -SW    S1 (bottom of foot)  Intact  -SW        Myotomal Screen- Lower Quarter Clearing    Hip flexion (L2)  5 (Normal)  -SW    Knee extension (L3)  5 (Normal)  -SW    Ankle DF (L4)  WNL  -SW    Great toe extension (L5)  WNL  -SW    Ankle PF (S1)  WNL  -SW    Knee flexion (S2)  5 (Normal)  -SW       Lumbar ROM Screen- Lower Quarter Clearing    Lumbar Flexion  Normal  -SW    Lumbar Extension  Normal  -SW    Lumbar Lateral Flexion  Normal  -SW    Lumbar Rotation  Normal  -SW       SI/Hip Screen- Lower Quarter Clearing    ASIS compression  Negative  -SW    ASIS distraction  Negative  -SW    Mary's/Oswaldo's test  Negative  -SW    Posterior thigh sheer  Negative  -SW    Pain in Denisha's area  Left:;Positive  -SW       Special Tests/Palpation    Special Tests/Palpation  -- taut hip flexor on L side.    -SW       Lumbosacral Accessory Motions    Lumbosacral Accessory Motions Tested?  Yes  -SW    PA glide- Sacral base  -- aligned  -    Innominate rotation  -- aligned   -SW       Lumbar/SI Special Tests    SI Compression Test (SI Dysfunction)  Negative  -SW    Lumbar/SI Special Tests Comments  Alignment improved this visit. No neural tension noted this date. L hip flexor irritation noted this date.  Taut at tendinous attachment.   -SW       Lumbosacral Palpation    Piriformis  Left:;Guarded/taut tension at distal attachment.  -SW    Quadratus Lumborum  Left:;Tender;Guarded/taut improved fascial mobility  -SW    Sacrotubrous Ligament  --  -SW    Greater Tuberosity  -- posterior lateral trochanter with ttp   -SW    Lumbosacral Palpation Comments  Cont with mild tension along piriformis and L glut with trigger observed.  L hip flexion tension also noted this visit.   -      User Key  (r) = Recorded By, (t) = Taken By, (c) = Cosigned By    Initials Name Provider Type    Pat Michel, PT DPT Physical Therapist                     PT Assessment/Plan     Row Name 05/06/21 1500          PT Assessment    Functional Limitations  Limitation in home  management;Limitations in community activities;Performance in leisure activities;Performance in self-care ADL;Performance in work activities  -     Impairments  Impaired muscle length;Impaired postural alignment;Pain;Posture  -     Assessment Comments  Patient responded well to treatment.  Triggers today cont in piriformis L side and new onset of L hip flexor irritation.  Improved with strain/counterstrain treatment for both.   Good tolerance with new additions for exercise, but challenged with leg ext.  Progressing and maintaining without restrictions noted in daily activity.   -     Rehab Potential  Good  -     Patient/caregiver participated in establishment of treatment plan and goals  Yes  -SW     Patient would benefit from skilled therapy intervention  Yes  -SW        PT Plan    PT Frequency  1x/week  -     PT Plan Comments  Manual work as needed piriformis and L hip flexor.  Cont opp arm and leg raises, resume stabilization with TB   -       User Key  (r) = Recorded By, (t) = Taken By, (c) = Cosigned By    Initials Name Provider Type    Pat Michel, PT DPT Physical Therapist            OP Exercises     Row Name 05/06/21 1808 05/06/21 1500          Subjective Comments    Subjective Comments  --  Patient feeling ok. 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  No new complaints.  Chiro worked on alignment.  Hurting more in ant front part of hip today.  Not sure why.   -SW        Subjective Pain    Able to rate subjective pain?  --  yes  -     Pre-Treatment Pain Level  --  2  -SW     Post-Treatment Pain Level  --  0  -SW        Total Minutes    19410 - PT Therapeutic Exercise Minutes  15  -SW  --     23532 - PT Manual Therapy Minutes  40  -SW  --        Exercise 1    Exercise Name 1  --  hamstring stretch   -     Reps 1  --  3  -SW     Time 1  --  30 sec  -SW        Exercise 2    Exercise Name 2  --  adductor stretch   -SW     Reps 2  --  3  -SW     Time 2  --   30 sec  -SW        Exercise 3    Exercise Name 3  --  piriformis stretch   -SW     Reps 3  --  3  -SW     Time 3  --  30 sec  -SW        Exercise 4    Exercise Name 4  --  prone TKE  -SW     Reps 4  --  15  -SW        Exercise 5    Exercise Name 5  --  prone opp arm and leg raise  -SW     Sets 5  --  2  -SW     Reps 5  --  10  -SW     Additional Comments  --  bilaterally with pelvic brace  -SW       User Key  (r) = Recorded By, (t) = Taken By, (c) = Cosigned By    Initials Name Provider Type     Pat Perez, PT DPT Physical Therapist           Manual Rx (last 36 hours)      Manual Treatments     Row Name 05/06/21 1808 05/06/21 1700          Total Minutes    33106 - PT Manual Therapy Minutes  40  -SW  --        Manual Rx 1    Manual Rx 1 Location  --  alignment assessment   -SW        Manual Rx 2    Manual Rx 2 Location  --  L hip flexor  -SW     Manual Rx 2 Type  --  strain/counterstrain to iliopsoas  -SW        Manual Rx 3    Manual Rx 3 Location  --  L piriformis  -SW     Manual Rx 3 Type  --  strain/counterstrain  -SW        Manual Rx 4    Manual Rx 4 Location  --  L lateral glut  -SW     Manual Rx 4 Type  --  strain/counterstrain   -SW        Manual Rx 5    Manual Rx 5 Location  --  lumbosacral region   -SW     Manual Rx 5 Type  --  MFR and STM  -SW       User Key  (r) = Recorded By, (t) = Taken By, (c) = Cosigned By    Initials Name Provider Type     Pat Perez, PT DPT Physical Therapist                    PT OP Goals     Row Name 05/06/21 1500          PT Short Term Goals    STG 1  patient to be indepdent with HEp as assigned at least 2-3x daily.  -     STG 1 Progress  Met  -     STG 2  patient to demo and verbalize I with finding neutral position while in seated and standing postures.   -     STG 2 Progress  Met  -     STG 3  patient to show proper TA engagement with stabilization exercises while in seated and standing postures as to help maintain neutral spine with dynamic  movements.   -     STG 3 Progress  Ongoing;Progressing  -     STG 4  patient to have symmetrical alignment to pelvic ring to support proper seated posture and balance.   -     STG 4 Progress  Met  -     STG 5  pain to dec to 3/10 at worse following work day that is easily managed with rest and / or change of positions.  -     STG 5 Progress  Met  -        Long Term Goals    LTG Date to Achieve  06/23/21  -     LTG 1  subjective improvement of 70%   -     LTG 1 Progress  Progressing  -     LTG 2  patient to not miss work days due to pain or discomfort and perform without restrictions.  -     LTG 2 Progress  Progressing  -     LTG 3  patient to tolerate inc seated activity for at least 1 hour to allow for travel to [place] without rest break required due to pain.  -     LTG 3 Progress  Progressing  -        Time Calculation    PT Goal Re-Cert Due Date  05/27/21  -       User Key  (r) = Recorded By, (t) = Taken By, (c) = Cosigned By    Initials Name Provider Type    Pat Michel, PT DPT Physical Therapist           Therapy Education  Given: HEP, Symptoms/condition management  Program: Reinforced, Progressed  How Provided: Demonstration, Verbal  Provided to: Patient  Level of Understanding: Teach back education performed, Verbalized, Demonstrated              Time Calculation:   Start Time: 1550  Stop Time: 1645  Time Calculation (min): 55 min  Time Calculation- PT  Start Time: 1550  Stop Time: 1645  Time Calculation (min): 55 min  PT Goal Re-Cert Due Date: 05/27/21  Timed Charges  39597 - PT Therapeutic Exercise Minutes: 15  18039 - PT Manual Therapy Minutes: 40  Total Minutes  Timed Charges Total Minutes: 55   Total Minutes: 55  Therapy Charges for Today     Code Description Service Date Service Provider Modifiers Qty    14053834686 HC PT THER PROC EA 15 MIN 5/6/2021 Pat Perez, PT DPT GP 1    82311926626 HC PT MANUAL THERAPY EA 15 MIN 5/6/2021 Pat Perez PT  DPT GP 3                    Pat Perez, PT DPT  5/6/2021

## 2021-05-10 ENCOUNTER — ROUTINE PRENATAL (OUTPATIENT)
Dept: OBSTETRICS AND GYNECOLOGY | Facility: CLINIC | Age: 31
End: 2021-05-10

## 2021-05-10 ENCOUNTER — HOSPITAL ENCOUNTER (OUTPATIENT)
Dept: PHYSICAL THERAPY | Facility: HOSPITAL | Age: 31
Setting detail: THERAPIES SERIES
Discharge: HOME | End: 2021-05-10

## 2021-05-10 VITALS — BODY MASS INDEX: 29.84 KG/M2 | SYSTOLIC BLOOD PRESSURE: 128 MMHG | WEIGHT: 208 LBS | DIASTOLIC BLOOD PRESSURE: 78 MMHG

## 2021-05-10 DIAGNOSIS — Z3A.34 34 WEEKS GESTATION OF PREGNANCY: ICD-10-CM

## 2021-05-10 DIAGNOSIS — Z34.83 ENCOUNTER FOR SUPERVISION OF OTHER NORMAL PREGNANCY IN THIRD TRIMESTER: Primary | ICD-10-CM

## 2021-05-10 DIAGNOSIS — M53.3 COCCYX PAIN: Primary | ICD-10-CM

## 2021-05-10 DIAGNOSIS — R00.2 HEART PALPITATIONS: ICD-10-CM

## 2021-05-10 PROCEDURE — 97110 THERAPEUTIC EXERCISES: CPT | Performed by: PHYSICAL THERAPIST

## 2021-05-10 PROCEDURE — 97140 MANUAL THERAPY 1/> REGIONS: CPT | Performed by: PHYSICAL THERAPIST

## 2021-05-10 PROCEDURE — 0502F SUBSEQUENT PRENATAL CARE: CPT | Performed by: OBSTETRICS & GYNECOLOGY

## 2021-05-10 NOTE — PROGRESS NOTES
CC: Prenatal visit    Angélica Hooper is a 30 y.o.  at 34w1d.  Doing well.  Denies contractions, LOF, or VB.  Reports good FM.    /78   Wt 94.3 kg (208 lb)   LMP 2020 (Exact Date)   BMI 29.84 kg/m²   Fundal Height (cm): 36 cm  Fetal Heart Rate: 148     Problems (from 20 to present)     Problem Noted Resolved    Supervision of normal pregnancy 2020 by Usha Villaseñor APRN No    Heart palpitations 2020 by Usha Villaseñor APRN No        A/P: Angélica Hooper is a 30 y.o.  at 34w1d.  - RTC in 2 weeks w/ GBS  - Declines EIOL  - Reviewed COVID-19 visitation policy  - Reviewed COVID-19 precautions     Diagnosis Plan   1. Encounter for supervision of other normal pregnancy in third trimester     2. Heart palpitations     3. 34 weeks gestation of pregnancy       Lisa Ramos MD  5/10/2021  15:37 CDT

## 2021-05-10 NOTE — THERAPY TREATMENT NOTE
Outpatient Physical Therapy Pelvic Health Treatment Note  [facility]     Patient Name: Angélica Hooper  : 1990  MRN: [id number]  Today's Date: 5/10/2021        Visit Date: 05/10/2021   Visit number:   Recheck: 21  Insurance: [employer]  Improvements: 60-70%      Visit Dx:    ICD-10-CM ICD-9-CM   1. Coccyx pain  M53.3 724.79       Patient Active Problem List   Diagnosis   • Supervision of normal pregnancy   • Heart palpitations        Pelvic Health     Row Name 05/10/21 1600             Pregnancy Questions    Number of Pregnancies  4  -SW      Number of Miscarriages  1  -SW      Number of Children  2  -SW      How many weeks pregnant are you?  34 weeks  -SW         Pain Assessment    Pain Score  0  -SW        User Key  (r) = Recorded By, (t) = Taken By, (c) = Cosigned By    Initials Name Provider Type    SW Pat Perez, PT DPT Physical Therapist        PT Ortho     Row Name 05/10/21 1700       Subjective Comments    Subjective Comments  Pt is doing well. Had some increased discomfort yesterday with prolonged seated position at [employer], but better today.  Just saw MD. Next week gets cervix checked and does her strep test.  Feels the weekly treatments are helping her to stay with improved pain for most part.  Goes to chiro this week possibly Wed.    -SW       Subjective Pain    Able to rate subjective pain?  yes  -SW    Pre-Treatment Pain Level  0  -SW    Post-Treatment Pain Level  0  -SW       Posture/Observations    Posture/Observations Comments  upright positioning. Good ambulation without waddle noted.  Good stabilization noted with gait and during exercise.  Alert and oriented.   -SW       SI/Hip Screen- Lower Quarter Clearing    Pain in Denisha's area  Left:;Positive  -SW       Lumbosacral Accessory Motions    PA glide- Sacral base  -- aligned  -SW    Innominate rotation  -- aligned  -SW       Lumbar/SI Special Tests    Lumbar/SI Special Tests Comments  Alignment good this  date.  Less tension noted around L sacral base and post hip. More located in lateral glut.  No neuro symptoms this visit.    -SW       Lumbosacral Palpation    Piriformis  Left:;Tender  -SW    Greater Tuberosity  -- post lateral trochanter  -SW    Lumbosacral Palpation Comments  Cont with most discomfort along L side buttock and sacral base.  Improving fascial glide.  Small trigger in lateral glut L side.   -      User Key  (r) = Recorded By, (t) = Taken By, (c) = Cosigned By    Initials Name Provider Type    Pat Michel, PT DPT Physical Therapist                     PT Assessment/Plan     Row Name 05/10/21 1600          PT Assessment    Functional Limitations  Limitation in home management;Limitations in community activities;Performance in leisure activities;Performance in self-care ADL;Performance in work activities  -     Impairments  Impaired muscle length;Impaired postural alignment;Pain;Posture  -SW     Assessment Comments  Pt's mobility without distress this date.  Showing improved stabilization with advanced exercise and with ambulation.  Gait was without hip drop noted this visit.  Still with trigger to L side lateral glut, but less tender around coccyx region and post lateral hip.  Maintaining with goals.  Good compliance with program.   -SW     Rehab Potential  Good  -     Patient/caregiver participated in establishment of treatment plan and goals  Yes  -SW     Patient would benefit from skilled therapy intervention  Yes  -SW        PT Plan    PT Frequency  1x/week  -SW     PT Plan Comments  seated stabilization with TB  -       User Key  (r) = Recorded By, (t) = Taken By, (c) = Cosigned By    Initials Name Provider Type    Pat Michel, PT DPT Physical Therapist            OP Exercises     Row Name 05/10/21 1715 05/10/21 1700 05/10/21 1500       Subjective Comments    Subjective Comments  --  Pt is doing well. Had some increased discomfort yesterday with prolonged seated  position at [employer], but better today.  Just saw MD. Next week gets cervix checked and does her strep test.  Feels the weekly treatments are helping her to stay with improved pain for most part.  Goes to chiro this week possibly Wed.    -SW  --       Subjective Pain    Able to rate subjective pain?  --  yes  -SW  --    Pre-Treatment Pain Level  --  0  -SW  --    Post-Treatment Pain Level  --  0  -SW  --       Total Minutes    23655 - PT Therapeutic Exercise Minutes  30  -SW  --  --    05621 - PT Manual Therapy Minutes  25  -SW  --  --       Exercise 1    Exercise Name 1  --  --  hamstring stretch   -SW    Reps 1  --  --  3  -SW    Time 1  --  --  30 sec  -SW       Exercise 2    Exercise Name 2  --  --  adductor stretch   -SW    Reps 2  --  --  3  -SW    Time 2  --  --  30 sec  -SW       Exercise 3    Exercise Name 3  --  --  piriformis stretch   -SW    Reps 3  --  --  3  -SW    Time 3  --  --  30 sec  -SW       Exercise 4    Exercise Name 4  --  --  prone TKE with pelvic brace  -SW    Sets 4  --  --  2  -SW    Reps 4  --  --  12  -SW       Exercise 5    Exercise Name 5  --  --  prone opp arm and leg raise.    -SW    Reps 5  --  --  2  -SW    Time 5  --  --  5  -SW       Exercise 6    Exercise Name 6  --  --  prone IR with TB  -SW    Reps 6  --  --  10  -SW      User Key  (r) = Recorded By, (t) = Taken By, (c) = Cosigned By    Initials Name Provider Type    SW Pat Perez, PT DPT Physical Therapist           Manual Rx (last 36 hours)      Manual Treatments     Row Name 05/10/21 1715 05/10/21 1700          Total Minutes    34481 - PT Manual Therapy Minutes  25  -SW  --        Manual Rx 1    Manual Rx 1 Location  --  alignment assessmen t  -SW        Manual Rx 2    Manual Rx 2 Location  --  L piriformis release  -     Manual Rx 2 Type  --  MFR and cupping   -        Manual Rx 3    Manual Rx 3 Location  --  L lateral gluteal  -     Manual Rx 3 Type  --  strain/counterstrain, cupping and MFR  -         Manual Rx 4    Manual Rx 4 Location  --  lumbosacral release  -     Manual Rx 4 Type  --  MFR, STM and cupping   -       User Key  (r) = Recorded By, (t) = Taken By, (c) = Cosigned By    Initials Name Provider Type    Pat Michel, PT DPT Physical Therapist                    PT OP Goals     Row Name 05/10/21 1600          PT Short Term Goals    STG 1  patient to be indepdent with HEp as assigned at least 2-3x daily.  -     STG 1 Progress  Met  -     STG 2  patient to demo and verbalize I with finding neutral position while in seated and standing postures.   -     STG 2 Progress  Met  -     STG 3  patient to show proper TA engagement with stabilization exercises while in seated and standing postures as to help maintain neutral spine with dynamic movements.   -     STG 3 Progress  Ongoing;Progressing  -     STG 4  patient to have symmetrical alignment to pelvic ring to support proper seated posture and balance.   -     STG 4 Progress  Met  -     STG 5  pain to dec to 3/10 at worse following work day that is easily managed with rest and / or change of positions.  -     STG 5 Progress  Met  -        Long Term Goals    LTG Date to Achieve  06/23/21  -     LTG 1  subjective improvement of 70%   -     LTG 1 Progress  Progressing  -     LTG 2  patient to not miss work days due to pain or discomfort and perform without restrictions.  -     LTG 2 Progress  Progressing  -     LTG 3  patient to tolerate inc seated activity for at least 1 hour to allow for travel to [place] without rest break required due to pain.  -     LTG 3 Progress  Progressing  -        Time Calculation    PT Goal Re-Cert Due Date  05/27/21  -       User Key  (r) = Recorded By, (t) = Taken By, (c) = Cosigned By    Initials Name Provider Type    Pat Michel, PT DPT Physical Therapist           Therapy Education  Given: HEP, Symptoms/condition management  Program: Reinforced  How Provided:  Demonstration, Verbal  Provided to: Patient  Level of Understanding: Teach back education performed, Verbalized, Demonstrated              Time Calculation:   Start Time: 1545  Stop Time: 1640  Time Calculation (min): 55 min  Time Calculation- PT  Start Time: 1545  Stop Time: 1640  Time Calculation (min): 55 min  PT Goal Re-Cert Due Date: 05/27/21  Timed Charges  29299 - PT Therapeutic Exercise Minutes: 30  98175 - PT Manual Therapy Minutes: 25  Total Minutes  Timed Charges Total Minutes: 55   Total Minutes: 55  Therapy Charges for Today     Code Description Service Date Service Provider Modifiers Qty    62591716828 HC PT THER PROC EA 15 MIN 5/10/2021 Pat Perez, PT DPT GP 2    78977810211 HC PT MANUAL THERAPY EA 15 MIN 5/10/2021 Pat Perez, PT DPT GP 2                    Pat Perez, PT DPT  5/10/2021

## 2021-05-17 ENCOUNTER — HOSPITAL ENCOUNTER (OUTPATIENT)
Dept: PHYSICAL THERAPY | Facility: HOSPITAL | Age: 31
Setting detail: THERAPIES SERIES
Discharge: HOME | End: 2021-05-17

## 2021-05-17 DIAGNOSIS — M53.3 COCCYX PAIN: Primary | ICD-10-CM

## 2021-05-17 PROCEDURE — 97110 THERAPEUTIC EXERCISES: CPT | Performed by: PHYSICAL THERAPIST

## 2021-05-17 PROCEDURE — 97140 MANUAL THERAPY 1/> REGIONS: CPT | Performed by: PHYSICAL THERAPIST

## 2021-05-17 NOTE — THERAPY TREATMENT NOTE
Outpatient Physical Therapy Pelvic Health Treatment Note  [facility]     Patient Name: Angélica Hooper  : 1990  MRN: [id number]  Today's Date: 2021        Visit Date: 2021  Visit Number:   Recheck: 21  Insurance: [facility]    Visit Dx:    ICD-10-CM ICD-9-CM   1. Coccyx pain  M53.3 724.79       Patient Active Problem List   Diagnosis   • Supervision of normal pregnancy   • Heart palpitations        Pelvic Health     Row Name 21 1600             Pregnancy Questions    Number of Pregnancies  4  -SW      Number of Miscarriages  1  -SW      Number of Children  2  -SW      How many weeks pregnant are you?  35 weeks  -        User Key  (r) = Recorded By, (t) = Taken By, (c) = Cosigned By    Initials Name Provider Type    Pat Michel, PT DPT Physical Therapist        PT Ortho     Row Name 21 1600       Subjective Comments    Subjective Comments  Busy weekend.  No real big issues over the weekend.    -SW       Subjective Pain    Post-Treatment Pain Level  0  -SW       Posture/Observations    Posture/Observations Comments  Good gait without distress.  Sit to stand on first attempt.   -SW       SI/Hip Screen- Lower Quarter Clearing    Pain in Denisha's area  Left:;Positive  -SW       Lumbosacral Accessory Motions    PA glide- Sacral base  -- prominent at sacral base  -SW    Innominate rotation  -- aligned  -SW       Lumbar/SI Special Tests    SI Compression Test (SI Dysfunction)  Negative  -SW    SI Distraction Test (SI Dysfunction)  Negative  -SW    Lumbar/SI Special Tests Comments  Sacral base prominent on L side.  L on R sacral torsion.   -SW       Lumbosacral Palpation    SI  Left:;Tender;Guarded/taut  -SW    Piriformis  Left:;Guarded/taut  -SW    Lumbosacral Palpation Comments  cont with tenderness along post Lateral hip; Trigger to piriformis muscle.  Improved fascial glide to lumbar region.  Most pain in L buttock today.   -SW      User Key  (r) =  Recorded By, (t) = Taken By, (c) = Cosigned By    Initials Name Provider Type     Pat Perez, PT DPT Physical Therapist                     PT Assessment/Plan     Row Name 05/17/21 1700 05/17/21 1600       PT Assessment    Functional Limitations  --  -  Limitation in home management;Limitations in community activities;Performance in leisure activities;Performance in self-care ADL;Performance in work activities  -    Impairments  --  -  Impaired muscle length;Impaired postural alignment;Pain;Posture  -    Assessment Comments  --  Pt cont to experience pain along L piriformis region and post lateral trochanter.  Improved post mobilization and cupping.  coccyx pain has resolved.  Pt has improved postural awareness with daily postures and activities.    -    Rehab Potential  --  Good  -    Patient/caregiver participated in establishment of treatment plan and goals  --  Yes  -    Patient would benefit from skilled therapy intervention  --  Yes  -       PT Plan    PT Frequency  --  1x/week  -    PT Plan Comments  --  anticipate 4-5 additional treatments then transition to DC.  Add seated stabilization next visit if able.   -      User Key  (r) = Recorded By, (t) = Taken By, (c) = Cosigned By    Initials Name Provider Type    Pat Michel, PT DPT Physical Therapist            OP Exercises     Row Name 05/17/21 1755 05/17/21 1600          Subjective Comments    Subjective Comments  --  Busy weekend.  No real big issues over the weekend.    -        Subjective Pain    Able to rate subjective pain?  --  yes  -     Pre-Treatment Pain Level  --  0  -SW     Post-Treatment Pain Level  --  0  -SW        Total Minutes    05144 - PT Therapeutic Exercise Minutes  15  -SW  --     32921 - PT Manual Therapy Minutes  25  -SW  --        Exercise 1    Exercise Name 1  --  hamstring stretch   -     Reps 1  --  3  -SW     Time 1  --  30 sec  -SW        Exercise 2    Exercise Name 2  --  adductor  stretch   -SW     Reps 2  --  3  -SW     Time 2  --  30 sec  -SW        Exercise 3    Exercise Name 3  --  piriformis stretch   -SW     Reps 3  --  3  -SW     Time 3  --  30 sec  -SW        Exercise 4    Exercise Name 4  --  prone hip ext  -SW     Sets 4  --  2  -SW     Reps 4  --  10  -SW        Exercise 5    Exercise Name 5  --  prone IR with TB  -SW     Sets 5  --  2  -SW     Reps 5  --  10  -SW       User Key  (r) = Recorded By, (t) = Taken By, (c) = Cosigned By    Initials Name Provider Type    Pat Michel, PT DPT Physical Therapist           Manual Rx (last 36 hours)      Manual Treatments     Row Name 05/17/21 1755 05/17/21 1700          Total Minutes    21469 - PT Manual Therapy Minutes  25  -SW  --        Manual Rx 1    Manual Rx 1 Location  --  alignment assessment   -     Manual Rx 1 Type  --  sacral torsion L on R  -SW        Manual Rx 2    Manual Rx 2 Location  --  L piriformis release  -     Manual Rx 2 Type  --  MFR, strain/counterstrain and cupping   -SW        Manual Rx 3    Manual Rx 3 Location  --  L lateral gluteal muscle  -     Manual Rx 3 Type  --  MFR and cupping   -SW        Manual Rx 4    Manual Rx 4 Location  --  sacral float  -SW       User Key  (r) = Recorded By, (t) = Taken By, (c) = Cosigned By    Initials Name Provider Type    Pat Michel, PT DPT Physical Therapist                    PT OP Goals     Row Name 05/17/21 1600          PT Short Term Goals    STG 1  patient to be indepdent with HEp as assigned at least 2-3x daily.  -     STG 1 Progress  Met  -     STG 2  patient to demo and verbalize I with finding neutral position while in seated and standing postures.   -     STG 2 Progress  Met  -     STG 3  patient to show proper TA engagement with stabilization exercises while in seated and standing postures as to help maintain neutral spine with dynamic movements.   -     STG 3 Progress  Ongoing;Progressing  -     STG 4  patient to have  symmetrical alignment to pelvic ring to support proper seated posture and balance.   -     STG 4 Progress  Met  -     STG 5  pain to dec to 3/10 at worse following work day that is easily managed with rest and / or change of positions.  -     STG 5 Progress  Met  -        Long Term Goals    LTG Date to Achieve  06/23/21  -     LTG 1  subjective improvement of 70%   -     LTG 1 Progress  Progressing  -     LTG 2  patient to not miss work days due to pain or discomfort and perform without restrictions.  -     LTG 2 Progress  Partially Met  -     LTG 2 Progress Comments  only 3 days remaining before school is out.   -     LTG 3  patient to tolerate inc seated activity for at least 1 hour to allow for travel to [place] without rest break required due to pain.  -     LTG 3 Progress  Met  -        Time Calculation    PT Goal Re-Cert Due Date  05/27/21  -       User Key  (r) = Recorded By, (t) = Taken By, (c) = Cosigned By    Initials Name Provider Type    Pat Michel, PT DPT Physical Therapist           Therapy Education  Given: Symptoms/condition management, HEP  Program: Reinforced  How Provided: Demonstration, Verbal  Provided to: Patient  Level of Understanding: Teach back education performed, Verbalized, Demonstrated              Time Calculation:   Start Time: 1615  Stop Time: 1700  Time Calculation (min): 45 min  Timed Charges  08186 - PT Therapeutic Exercise Minutes: 15  45231 - PT Manual Therapy Minutes: 25  Total Minutes  Timed Charges Total Minutes: 40   Total Minutes: 40  Therapy Charges for Today     Code Description Service Date Service Provider Modifiers Qty    20075589055 HC PT THER PROC EA 15 MIN 5/17/2021 Pat Perez, PT DPT GP 1    45874210804 HC PT MANUAL THERAPY EA 15 MIN 5/17/2021 Pat Perez PT DPT GP 2                    Pat Perez PT DPT  5/17/2021

## 2021-05-24 ENCOUNTER — HOSPITAL ENCOUNTER (OUTPATIENT)
Dept: PHYSICAL THERAPY | Facility: HOSPITAL | Age: 31
Setting detail: THERAPIES SERIES
Discharge: HOME | End: 2021-05-24

## 2021-05-24 ENCOUNTER — ROUTINE PRENATAL (OUTPATIENT)
Dept: OBSTETRICS AND GYNECOLOGY | Facility: CLINIC | Age: 31
End: 2021-05-24

## 2021-05-24 VITALS — DIASTOLIC BLOOD PRESSURE: 72 MMHG | SYSTOLIC BLOOD PRESSURE: 120 MMHG | BODY MASS INDEX: 29.84 KG/M2 | WEIGHT: 208 LBS

## 2021-05-24 DIAGNOSIS — Z34.83 ENCOUNTER FOR SUPERVISION OF OTHER NORMAL PREGNANCY IN THIRD TRIMESTER: ICD-10-CM

## 2021-05-24 DIAGNOSIS — Z3A.36 36 WEEKS GESTATION OF PREGNANCY: Primary | ICD-10-CM

## 2021-05-24 DIAGNOSIS — M53.3 COCCYX PAIN: Primary | ICD-10-CM

## 2021-05-24 DIAGNOSIS — R00.2 HEART PALPITATIONS: ICD-10-CM

## 2021-05-24 PROCEDURE — 97110 THERAPEUTIC EXERCISES: CPT | Performed by: PHYSICAL THERAPIST

## 2021-05-24 PROCEDURE — 87653 STREP B DNA AMP PROBE: CPT | Performed by: OBSTETRICS & GYNECOLOGY

## 2021-05-24 PROCEDURE — 97140 MANUAL THERAPY 1/> REGIONS: CPT | Performed by: PHYSICAL THERAPIST

## 2021-05-24 PROCEDURE — 0502F SUBSEQUENT PRENATAL CARE: CPT | Performed by: OBSTETRICS & GYNECOLOGY

## 2021-05-24 NOTE — THERAPY PROGRESS REPORT/RE-CERT
Outpatient Physical Therapy Pelvic Health Progress Note  [facility]     Patient Name: Angélica Hooper  : 1990  MRN: [id number]  Today's Date: 2021        Visit Date: 2021   Visit Number:   Recheck: 21  Insurance: [employer]  Improvement: 65-70%      Visit Dx:    ICD-10-CM ICD-9-CM   1. Coccyx pain  M53.3 724.79       Patient Active Problem List   Diagnosis   • Supervision of normal pregnancy   • Heart palpitations        Pelvic Health     Row Name 21 1500             Pregnancy Questions    Number of Pregnancies  4  -SW      Number of Miscarriages  1  -SW      Number of Children  2  -SW      How many weeks pregnant are you?  36 weeks  -SW        User Key  (r) = Recorded By, (t) = Taken By, (c) = Cosigned By    Initials Name Provider Type    SW Pat Perez, PT DPT Physical Therapist        PT Ortho     Row Name 21 1500       Subjective Comments    Subjective Comments  Thought she may be having back labor last night.  Couple of contractions, but not then.  Has been good with hydration.  Figured body was just preparing for delivery. Pt reports having went bowling over weekend.  Could have contributed.  She is aching all over today.  Been moving rooms at school as well.  -SW       Subjective Pain    Pre-Treatment Pain Level  3  -SW    Post-Treatment Pain Level  0  -SW       Posture/Observations    Posture/Observations Comments  WBOS with gait. Equal step and stride noted.   -SW       SI/Hip Screen- Lower Quarter Clearing    Pain in Denisha's area  Left:;Positive  -SW       Lumbosacral Accessory Motions    PA glide- Sacral base  -- prominent at sacral base  -SW    Innominate rotation  -- aligned  -SW       Lumbar/SI Special Tests    SI Compression Test (SI Dysfunction)  Negative  -SW    SI Distraction Test (SI Dysfunction)  Negative  -SW       Lumbosacral Palpation    SI  Left:;Tender;Guarded/taut  -SW    Piriformis  Left:;Guarded/taut  -SW    Lumbosacral  Palpation Comments  cont with tenderness along post Lateral hip; Trigger to piriformis muscle.  Improved fascial glide to lumbar region.  Most pain in L buttock today.   -      User Key  (r) = Recorded By, (t) = Taken By, (c) = Cosigned By    Initials Name Provider Type    Pat Michel, PT DPT Physical Therapist                     PT Assessment/Plan     Row Name 05/24/21 1500          PT Assessment    Functional Limitations  Limitation in home management;Limitations in community activities;Performance in leisure activities;Performance in self-care ADL;Performance in work activities  -     Impairments  Impaired muscle length;Impaired postural alignment;Pain;Posture  -     Assessment Comments  Pt is advancing with pregnancy.  Able to tolerate some training this date for stabilization, but more focus with manual therapy to release trigger to R piriformis.  Responded well without complaints.  Pain reduced following treatment.  All goals met except LTG 1.  Good progression toward achievement.   -     Rehab Potential  Good  -     Patient/caregiver participated in establishment of treatment plan and goals  Yes  -     Patient would benefit from skilled therapy intervention  Yes  -        PT Plan    PT Frequency  1x/week  -     PT Plan Comments  Add prone stabilization next visit.  Potential quad with opp arm/leg raise  -       User Key  (r) = Recorded By, (t) = Taken By, (c) = Cosigned By    Initials Name Provider Type    Pat Michel, PT DPT Physical Therapist            OP Exercises     Row Name 05/24/21 1653 05/24/21 1500          Subjective Comments    Subjective Comments  --  Thought she may be having back labor last night.  Couple of contractions, but not then.  Has been good with hydration.  Figured body was just preparing for delivery. Pt reports having went bowling over weekend.  Could have contributed.  She is aching all over today.  Been moving rooms at school as well.  -         Subjective Pain    Able to rate subjective pain?  --  yes  -SW     Pre-Treatment Pain Level  --  3  -SW     Post-Treatment Pain Level  --  0  -SW        Total Minutes    12948 - PT Therapeutic Exercise Minutes  20  -SW  --     77519 - PT Manual Therapy Minutes  25  -SW  --        Exercise 1    Exercise Name 1  --  hamstring stretch   -SW     Reps 1  --  3  -SW     Time 1  --  30 sec  -SW        Exercise 2    Exercise Name 2  --  adductor stretch   -SW     Reps 2  --  3  -SW     Time 2  --  30 sec  -SW        Exercise 3    Exercise Name 3  --  piriformis stretch   -SW     Reps 3  --  3  -SW     Time 3  --  30 sec  -SW        Exercise 4    Exercise Name 4  --  GTB clocks at pelvic neutral spine  -SW     Sets 4  --  2  -SW     Reps 4  --  10  -SW        Exercise 5    Exercise Name 5  --  GTB rows at pelvic neutral spine  -SW     Sets 5  --  2  -SW     Reps 5  --  10  -SW       User Key  (r) = Recorded By, (t) = Taken By, (c) = Cosigned By    Initials Name Provider Type     Pat Perez PT DPT Physical Therapist           Manual Rx (last 36 hours)      Manual Treatments     Row Name 05/24/21 1653 05/24/21 1500          Total Minutes    15228 - PT Manual Therapy Minutes  25  -SW  --        Manual Rx 1    Manual Rx 1 Location  --  alignment assessment   -SW        Manual Rx 2    Manual Rx 2 Location  --  R trigger piriformis  -SW     Manual Rx 2 Type  --  straing/counterstrain and cupping   -SW        Manual Rx 3    Manual Rx 3 Location  --  sacral float  -SW        Manual Rx 4    Manual Rx 4 Location  --  lumbosacral   -SW     Manual Rx 4 Type  --  soft tissue mobilization   -SW        Manual Rx 5    Manual Rx 5 Location  --  spinal extensor muscles Bilaterally   -SW     Manual Rx 5 Type  --  soft tissue mobilization and cupping.   -SW       User Key  (r) = Recorded By, (t) = Taken By, (c) = Cosigned By    Initials Name Provider Type     Pat Perez PT DPT Physical Therapist                     PT OP Goals     Row Name 05/24/21 1500          PT Short Term Goals    STG 1  patient to be indepdent with HEp as assigned at least 2-3x daily.  -     STG 1 Progress  Met  -     STG 2  patient to demo and verbalize I with finding neutral position while in seated and standing postures.   -     STG 2 Progress  Met  -     STG 3  patient to show proper TA engagement with stabilization exercises while in seated and standing postures as to help maintain neutral spine with dynamic movements.   -     STG 3 Progress  Met  -     STG 3 Progress Comments  no cues required.  -     STG 4  patient to have symmetrical alignment to pelvic ring to support proper seated posture and balance.   -     STG 4 Progress  Met  -     STG 5  pain to dec to 3/10 at worse following work day that is easily managed with rest and / or change of positions.  -     STG 5 Progress  Met  -        Long Term Goals    LTG Date to Achieve  06/24/21  -     LTG 1  subjective improvement of 70%   -     LTG 1 Progress  Progressing  -     LTG 2  patient to not miss work days due to pain or discomfort and perform without restrictions.  -     LTG 2 Progress  Met  -     LTG 2 Progress Comments  no missed days from work.  Summer now without work scheduled.   -     LTG 3  patient to tolerate inc seated activity for at least 1 hour to allow for travel to [place] without rest break required due to pain.  -     LTG 3 Progress  Met  -        Time Calculation    PT Goal Re-Cert Due Date  06/24/21  -       User Key  (r) = Recorded By, (t) = Taken By, (c) = Cosigned By    Initials Name Provider Type    Pat Michel, PT DPT Physical Therapist           Therapy Education  Given: HEP  Program: Progressed, Reinforced  How Provided: Demonstration  Provided to: Patient  Level of Understanding: Verbalized, Demonstrated              Time Calculation:   Start Time: 1515  Stop Time: 1600  Time Calculation (min): 45  min  Timed Charges  28596 - PT Therapeutic Exercise Minutes: 20  98917 - PT Manual Therapy Minutes: 25  Total Minutes  Timed Charges Total Minutes: 45   Total Minutes: 45  Therapy Charges for Today     Code Description Service Date Service Provider Modifiers Qty    28152575968 HC PT THER PROC EA 15 MIN 5/24/2021 Pat Perez, PT DPT GP 1    74693820383 HC PT MANUAL THERAPY EA 15 MIN 5/24/2021 Pat Perez, PT DPT GP 2                    Pat Perez, PT DPT  5/24/2021

## 2021-05-25 LAB — GROUP B STREP, DNA: NEGATIVE

## 2021-05-25 NOTE — PROGRESS NOTES
CC: Prenatal visit    Angélica Hooper is a 31 y.o.  at 36w1d.  Doing well.  Denies contractions, LOF, or VB.  Reports good FM.    /72   Wt 94.3 kg (208 lb)   LMP 2020 (Exact Date)   BMI 29.84 kg/m²   SVE: 1/thick/high/soft/posterior  BSUS: cephalic     Fetal Heart Rate: 130s     Problems (from 20 to present)     Problem Noted Resolved    Supervision of normal pregnancy 2020 by Usha Villaseñor APRN No    Heart palpitations 2020 by Usha Villaseñor APRN No        A/P: Angélica Hooper is a 31 y.o.  at 36w1d.  - RTC in 1 week  - Offered EIOL at 39wks; patient declines.  Discussed call schedule.  - Reviewed COVID-19 visitation policy  - Reviewed COVID-19 precautions     Diagnosis Plan   1. 36 weeks gestation of pregnancy  Group B Strep (Molecular) - Swab, Vaginal/Rectum   2. Encounter for supervision of other normal pregnancy in third trimester     3. Heart palpitations       Lisa Ramos MD  2021  21:01 CDT

## 2021-06-03 ENCOUNTER — ROUTINE PRENATAL (OUTPATIENT)
Dept: OBSTETRICS AND GYNECOLOGY | Facility: CLINIC | Age: 31
End: 2021-06-03

## 2021-06-03 ENCOUNTER — HOSPITAL ENCOUNTER (OUTPATIENT)
Dept: PHYSICAL THERAPY | Facility: HOSPITAL | Age: 31
Setting detail: THERAPIES SERIES
Discharge: HOME | End: 2021-06-03

## 2021-06-03 VITALS — SYSTOLIC BLOOD PRESSURE: 122 MMHG | BODY MASS INDEX: 29.87 KG/M2 | WEIGHT: 208.2 LBS | DIASTOLIC BLOOD PRESSURE: 74 MMHG

## 2021-06-03 DIAGNOSIS — Z34.83 ENCOUNTER FOR SUPERVISION OF OTHER NORMAL PREGNANCY IN THIRD TRIMESTER: Primary | ICD-10-CM

## 2021-06-03 DIAGNOSIS — R00.2 HEART PALPITATIONS: ICD-10-CM

## 2021-06-03 DIAGNOSIS — Z3A.37 37 WEEKS GESTATION OF PREGNANCY: ICD-10-CM

## 2021-06-03 DIAGNOSIS — M53.3 COCCYX PAIN: Primary | ICD-10-CM

## 2021-06-03 PROCEDURE — 97140 MANUAL THERAPY 1/> REGIONS: CPT | Performed by: PHYSICAL THERAPIST

## 2021-06-03 PROCEDURE — 0502F SUBSEQUENT PRENATAL CARE: CPT | Performed by: OBSTETRICS & GYNECOLOGY

## 2021-06-03 PROCEDURE — 97110 THERAPEUTIC EXERCISES: CPT | Performed by: PHYSICAL THERAPIST

## 2021-06-03 NOTE — THERAPY TREATMENT NOTE
Outpatient Physical Therapy Pelvic Health Treatment Note  [facility]     Patient Name: Angélica Hooper  : 1990  MRN: [id number]  Today's Date: 6/3/2021        Visit Date: 2021  Visit number:   Recheck: 21  Insurance: [facility]    Visit Dx:    ICD-10-CM ICD-9-CM   1. Coccyx pain  M53.3 724.79       Patient Active Problem List   Diagnosis   • Supervision of normal pregnancy   • Heart palpitations        Pelvic Health     Row Name 21 0900             Subjective Comments    Subjective Comments  I am completely done with school.  My tailbone has been hurting some, but doing better.  Have been sitting more than usual.  I have had some cramping but no contractions.   -SW         Pregnancy Questions    Number of Pregnancies  4  -SW      Number of Miscarriages  1  -SW      Number of Children  2  -SW      How many weeks pregnant are you?  37 weeks  -SW         Pain Assessment    Pain Score  1  -SW      Post Pain Score  0  -SW        User Key  (r) = Recorded By, (t) = Taken By, (c) = Cosigned By    Initials Name Provider Type    SW Pat Perez, PT DPT Physical Therapist        PT Ortho     Row Name 21 0900       Subjective Pain    Able to rate subjective pain?  yes  -SW    Pre-Treatment Pain Level  1  -SW    Post-Treatment Pain Level  0  -SW       Posture/Observations    Posture/Observations Comments  good spirits this date.  Gait with normal step pattern.  Not near as WBOS with stepping as was last visit.   -SW       Neural Tension Signs- Lower Quarter Clearing    Slump  Negative  -SW    SLR  Negative  -SW       Lumbar ROM Screen- Lower Quarter Clearing    Lumbar Flexion  Normal  -SW    Lumbar Extension  Normal  -SW    Lumbar Lateral Flexion  Normal  -SW    Lumbar Rotation  Normal  -SW       SI/Hip Screen- Lower Quarter Clearing    Pain in Denisha's area  Left:;Positive  -SW       Lumbosacral Accessory Motions    PA glide- Sacral base  -- tender on L side base, but  level with symmetry  -SW    Innominate rotation  -- symmetrical   -SW       Lumbar/SI Special Tests    SI Compression Test (SI Dysfunction)  Negative  -SW    SI Distraction Test (SI Dysfunction)  Negative  -SW    Lumbar/SI Special Tests Comments  All provocative tests remain negative.  Still with c/o on L side base.  Alignment level otherwise.   -SW       Lumbosacral Palpation    SI  Left:;Tender  -SW    Piriformis  -- nontender to muscle belly this date  -    Lumbosacral Palpation Comments  overall piriformis tenderness has resolved.  Cont with mild tenderness along L sacral base.  No other triggers noted throughout sacral region.   -      User Key  (r) = Recorded By, (t) = Taken By, (c) = Cosigned By    Initials Name Provider Type    Pat Michel PT DPT Physical Therapist                     PT Assessment/Plan     Row Name 06/03/21 1600          PT Assessment    Functional Limitations  Limitation in home management;Limitations in community activities;Performance in leisure activities;Performance in self-care ADL;Performance in work activities  -     Impairments  Impaired muscle length;Impaired postural alignment;Pain;Posture  -SW     Assessment Comments  Overall presentation continues to improve.  Gait is with less antalgia and more normal MARLA.  Alignment improved along with functional movement.  Pt is able to do more functionally at home with kids without pain.  Anticipate 2 additional visits then will transition to I management.    -SW     Rehab Potential  Good  -     Patient/caregiver participated in establishment of treatment plan and goals  Yes  -SW     Patient would benefit from skilled therapy intervention  Yes  -SW        PT Plan    PT Frequency  1x/week  -SW     PT Plan Comments  prone stabilization next visit.   -       User Key  (r) = Recorded By, (t) = Taken By, (c) = Cosigned By    Initials Name Provider Type    Pat Michel PT DPT Physical Therapist            OP  Exercises     Row Name 06/03/21 1639 06/03/21 0900          Subjective Comments    Subjective Comments  --  I am completely done with school.  My tailbone has been hurting some, but doing better.  Have been sitting more than usual.  I have had some cramping but no contractions.   -SW        Subjective Pain    Able to rate subjective pain?  --  yes  -SW     Pre-Treatment Pain Level  --  1  -SW     Post-Treatment Pain Level  --  0  -SW        Total Minutes    26607 - PT Therapeutic Exercise Minutes  30  -SW  --     99433 - PT Manual Therapy Minutes  10  -SW  --        Exercise 1    Exercise Name 1  --  hamstring stretch   -SW     Reps 1  --  3  -SW     Time 1  --  30 sec  -SW        Exercise 2    Exercise Name 2  --  adductor stretch   -SW     Reps 2  --  3  -SW     Time 2  --  30 sec  -SW        Exercise 3    Exercise Name 3  --  piriformis stretch   -SW     Reps 3  --  3  -SW     Time 3  --  30 sec  -SW        Exercise 4    Exercise Name 4  --  SL clamshells  -SW     Reps 4  --  12  -SW     Additional Comments  --  bilaterally   -SW        Exercise 5    Exercise Name 5  --  SL hip abd  -SW     Reps 5  --  12  -SW     Additional Comments  --  bilaterally   -SW        Exercise 6    Exercise Name 6  --  prone TKE  -SW     Sets 6  --  2  -SW     Reps 6  --  10  -SW        Exercise 7    Exercise Name 7  --  prone opp arm raises  -SW     Sets 7  --  2  -SW     Reps 7  --  10  -SW       User Key  (r) = Recorded By, (t) = Taken By, (c) = Cosigned By    Initials Name Provider Type    SW Pat Perez, PT DPT Physical Therapist           Manual Rx (last 36 hours)      Manual Treatments     Row Name 06/03/21 1639 06/03/21 1500          Total Minutes    19798 - PT Manual Therapy Minutes  10  -SW  --        Manual Rx 1    Manual Rx 1 Location  --  alginment assessment   -SW        Manual Rx 2    Manual Rx 2 Location  --  sacral float  -SW        Manual Rx 3    Manual Rx 3 Location  --  lumbar  -SW     Manual Rx 3 Type  --   soft tissue mobilization and cupping  -       User Key  (r) = Recorded By, (t) = Taken By, (c) = Cosigned By    Initials Name Provider Type    Pat Michel, PT DPT Physical Therapist                    PT OP Goals     Row Name 06/03/21 0900          PT Short Term Goals    STG 1  patient to be indepdent with HEp as assigned at least 2-3x daily.  -     STG 1 Progress  Met  -     STG 2  patient to demo and verbalize I with finding neutral position while in seated and standing postures.   -     STG 2 Progress  Met  -     STG 3  patient to show proper TA engagement with stabilization exercises while in seated and standing postures as to help maintain neutral spine with dynamic movements.   -     STG 3 Progress  Met  -     STG 4  patient to have symmetrical alignment to pelvic ring to support proper seated posture and balance.   -     STG 4 Progress  Met  -     STG 5  pain to dec to 3/10 at worse following work day that is easily managed with rest and / or change of positions.  -     STG 5 Progress  Met  -        Long Term Goals    LTG Date to Achieve  06/24/21  -     LTG 1  subjective improvement of 70%   -     LTG 1 Progress  Progressing  -     LTG 2  patient to not miss work days due to pain or discomfort and perform without restrictions.  -     LTG 2 Progress  Met  -     LTG 3  patient to tolerate inc seated activity for at least 1 hour to allow for travel to [place] without rest break required due to pain.  -     LTG 3 Progress  Met  -        Time Calculation    PT Goal Re-Cert Due Date  06/24/21  -       User Key  (r) = Recorded By, (t) = Taken By, (c) = Cosigned By    Initials Name Provider Type    Pat Michel, PT DPT Physical Therapist           Therapy Education  Given: HEP  Program: Reinforced  How Provided: Verbal, Demonstration  Provided to: Patient  Level of Understanding: Teach back education performed, Verbalized, Demonstrated              Time  Calculation:   Start Time: 0920  Stop Time: 1000  Time Calculation (min): 40 min  Timed Charges  01259 - PT Therapeutic Exercise Minutes: 30  24014 - PT Manual Therapy Minutes: 10  Total Minutes  Timed Charges Total Minutes: 40   Total Minutes: 40  Therapy Charges for Today     Code Description Service Date Service Provider Modifiers Qty    39601403754 HC PT THER PROC EA 15 MIN 6/3/2021 Pat Perez, PT DPT GP 2    71869248803 HC PT MANUAL THERAPY EA 15 MIN 6/3/2021 Pat Perez, PT DPT GP 1                    Pat Perez, PT DPT  6/3/2021

## 2021-06-03 NOTE — PROGRESS NOTES
CC: Prenatal visit    Angélica Hooper is a 31 y.o.  at 37w4d.  Doing well.  Denies contractions, LOF, or VB.  Reports good FM.    /74   Wt 94.4 kg (208 lb 3.2 oz)   LMP 2020 (Exact Date)   BMI 29.87 kg/m²   SVE: 2/50/-4/soft/mid, vertex  Fundal Height (cm): 37 cm  Fetal Heart Rate: 144     Problems (from 20 to present)     Problem Noted Resolved    Supervision of normal pregnancy 2020 by Usha Villaseñor, NEO No    Heart palpitations 2020 by Usha Villaseñor APRN No        A/P: Angélica Hooper is a 31 y.o.  at 37w4d.  - RTC in 1 week  - Declines EIOL at this time  - Reviewed COVID-19 visitation policy  - Reviewed COVID-19 precautions     Diagnosis Plan   1. Encounter for supervision of other normal pregnancy in third trimester     2. Heart palpitations     3. 37 weeks gestation of pregnancy       Lisa Ramos MD  6/3/2021  10:31 CDT

## 2021-06-08 ENCOUNTER — ROUTINE PRENATAL (OUTPATIENT)
Dept: OBSTETRICS AND GYNECOLOGY | Facility: CLINIC | Age: 31
End: 2021-06-08

## 2021-06-08 ENCOUNTER — HOSPITAL ENCOUNTER (OUTPATIENT)
Dept: PHYSICAL THERAPY | Facility: HOSPITAL | Age: 31
Setting detail: THERAPIES SERIES
Discharge: HOME | End: 2021-06-08

## 2021-06-08 VITALS — WEIGHT: 209 LBS | BODY MASS INDEX: 29.99 KG/M2 | SYSTOLIC BLOOD PRESSURE: 124 MMHG | DIASTOLIC BLOOD PRESSURE: 76 MMHG

## 2021-06-08 DIAGNOSIS — R00.2 HEART PALPITATIONS: ICD-10-CM

## 2021-06-08 DIAGNOSIS — Z34.83 ENCOUNTER FOR SUPERVISION OF OTHER NORMAL PREGNANCY IN THIRD TRIMESTER: Primary | ICD-10-CM

## 2021-06-08 DIAGNOSIS — Z3A.38 38 WEEKS GESTATION OF PREGNANCY: ICD-10-CM

## 2021-06-08 DIAGNOSIS — M53.3 COCCYX PAIN: Primary | ICD-10-CM

## 2021-06-08 PROCEDURE — 97140 MANUAL THERAPY 1/> REGIONS: CPT | Performed by: PHYSICAL THERAPIST

## 2021-06-08 PROCEDURE — 97110 THERAPEUTIC EXERCISES: CPT | Performed by: PHYSICAL THERAPIST

## 2021-06-08 PROCEDURE — 0502F SUBSEQUENT PRENATAL CARE: CPT | Performed by: OBSTETRICS & GYNECOLOGY

## 2021-06-08 NOTE — PROGRESS NOTES
CC: Prenatal visit    Angélica Hooper is a 31 y.o.  at 38w2d.  Doing well.  Denies contractions, LOF, or VB.  Reports good FM.    /76   Wt 94.8 kg (209 lb)   LMP 2020 (Exact Date)   BMI 29.99 kg/m²   SVE: 3-4/60/-3/soft/mid, vertex  Fundal Height (cm): 37 cm  Fetal Heart Rate: 138     Problems (from 20 to present)     Problem Noted Resolved    Supervision of normal pregnancy 2020 by Usha Villaseñor, NEO No    Heart palpitations 2020 by Usha Villaseñor APRN No        A/P: Angélica Hooper is a 31 y.o.  at 38w2d.  - RTC in 1 week  - Declines EIOL  - Discussed call schedule  - Reviewed COVID-19 visitation policy  - Reviewed COVID-19 precautions     Diagnosis Plan   1. Encounter for supervision of other normal pregnancy in third trimester     2. Heart palpitations     3. 38 weeks gestation of pregnancy       Lisa Ramos MD  2021  11:32 CDT

## 2021-06-09 NOTE — THERAPY TREATMENT NOTE
"    Outpatient Physical Therapy Pelvic Health Treatment Note  [facility]     Patient Name: Angélica Hooper  : 1990  MRN: [id number]  Today's Date: 2021        Visit Date: 2021   Visit number: 15/15  Recheck: 21  Insurance: [facility]      Visit Dx:    ICD-10-CM ICD-9-CM   1. Coccyx pain  M53.3 724.79       Patient Active Problem List   Diagnosis   • Supervision of normal pregnancy   • Heart palpitations        Pelvic Health     Row Name 21 1200             Pregnancy Questions    Number of Pregnancies  4  -SW      Number of Miscarriages  1  -SW      Number of Children  2  -SW      How many weeks pregnant are you?  38 weeks  -SW         Pain Assessment    Pain Assessment  0-10  -SW      Pain Score  1  -SW      Post Pain Score  0  -SW        User Key  (r) = Recorded By, (t) = Taken By, (c) = Cosigned By    Initials Name Provider Type    SW Pat Perez, PT DPT Physical Therapist        PT Ortho     Row Name 21 1200       Subjective Comments    Subjective Comments  Feeling ok today.  Saw Dr. Ramos and stated 3-4 cm dilated and 60% effaced.  I am definitely progressing.  Feels that she is having some contractions.  Feeling more in LB but also some into abdominal wall.  I am so scared that it will happen at most inopportune time.  What if my water breaks on [employer] platform. Had intercourse with attempts to thin her walls.  That may be why my hip is bothering me.   -SW       Subjective Pain    Able to rate subjective pain?  yes  -SW    Pre-Treatment Pain Level  1  -SW    Post-Treatment Pain Level  0  -SW       Posture/Observations    Posture- WNL  Posture is WNL  -SW    Posture/Observations Comments  upright position.  Good transfers.  No guarding with position.  Ocassional twist of L hip stating \"if feels like it is going to pop out.\"   -SW       SI/Hip Screen- Lower Quarter Clearing    Pain in Denisha's area  Bilateral:;Positive  -SW       Lumbosacral Accessory Motions "    PA glide- Sacral base  -- aligned; pain at SI base bilaterally   -SW    Innominate rotation  -- aligned  -SW       Lumbar/SI Special Tests    Lumbar/SI Special Tests Comments  cont with good alignment and position.  Provocative tests cont negative.  Tenderness bilateral sacral base this date, but good position noted.    -SW       Lumbosacral Palpation    SI  Bilateral:;Tender  -SW    Piriformis  Left:;Tender  -SW    Lumbosacral Palpation Comments  L piriformis tender this date, but no trigger is noted.    -      User Key  (r) = Recorded By, (t) = Taken By, (c) = Cosigned By    Initials Name Provider Type    SW Pat Perez, PT DPT Physical Therapist                     PT Assessment/Plan     Row Name 06/08/21 1200          PT Assessment    Functional Limitations  Limitation in home management;Limitations in community activities;Performance in leisure activities;Performance in self-care ADL;Performance in work activities  -SW     Impairments  Impaired muscle length;Impaired postural alignment;Pain;Posture  -SW     Assessment Comments  Pt's clinical impression conts to be improved.  Alignment is remaining in good position.  Cont with tenderness across SI but assume relation to ligamentous pull of uterosacral ligaments.  Tender along piriformis. Pt is becoming anxious about birth but excitedly awaiting the birth of their child.  Maintained good position with alignment.  Feels better post manual.  Anticipate one additional visit then transition to DC. All goals met at this time.   -SW     Rehab Potential  Good  -SW     Patient/caregiver participated in establishment of treatment plan and goals  Yes  -SW     Patient would benefit from skilled therapy intervention  Yes  -SW        PT Plan    PT Frequency  1x/week  -SW     PT Plan Comments  cont manual as needed.  1 additional visit if no delivery prior to next visit.   -       User Key  (r) = Recorded By, (t) = Taken By, (c) = Cosigned By    Initials Name  Provider Type    SW Pat Perez, PT DPT Physical Therapist            OP Exercises     Row Name 06/08/21 1916 06/08/21 1200          Subjective Comments    Subjective Comments  --  Feeling ok today.  Saw Dr. Ramos and stated 3-4 cm dilated and 60% effaced.  I am definitely progressing.  Feels that she is having some contractions.  Feeling more in LB but also some into abdominal wall.  I am so scared that it will happen at most inopportune time.  What if my water breaks on [employer] platform. Had intercourse with attempts to thin her walls.  That may be why my hip is bothering me.   -SW        Subjective Pain    Able to rate subjective pain?  --  yes  -SW     Pre-Treatment Pain Level  --  1  -SW     Post-Treatment Pain Level  --  0  -SW        Total Minutes    38371 - PT Therapeutic Exercise Minutes  25  -SW  --     31770 - PT Manual Therapy Minutes  15  -SW  --        Exercise 1    Exercise Name 1  --  hamstring stretch   -SW     Reps 1  --  3  -SW     Time 1  --  30 sec  -SW        Exercise 2    Exercise Name 2  --  adductor stretch   -SW     Reps 2  --  3  -SW     Time 2  --  30 sec  -SW        Exercise 3    Exercise Name 3  --  physioball bounce   -SW     Time 3  --  1'min   -SW        Exercise 4    Exercise Name 4  --  physioball circles.   -SW     Time 4  --  1'min   -SW     Additional Comments  --  CW and CCW  -SW        Exercise 5    Exercise Name 5  --  minisquats with pelvic neutral   -SW     Reps 5  --  15  -SW        Exercise 6    Exercise Name 6  --  prone ext   -SW     Reps 6  --  5 bilaterally   -SW       User Key  (r) = Recorded By, (t) = Taken By, (c) = Cosigned By    Initials Name Provider Type    SW Pat Perez, PT DPT Physical Therapist           Manual Rx (last 36 hours)      Manual Treatments     Row Name 06/08/21 1916             Total Minutes    79109 - PT Manual Therapy Minutes  15  -SW         Manual Rx 1    Manual Rx 1 Location  alignment assessment   -SW         Manual Rx 2     Manual Rx 2 Location  sacral float  -SW         Manual Rx 3    Manual Rx 3 Location  lumbosacral   -SW      Manual Rx 3 Type  soft tissue mobilization and cuupping   -        User Key  (r) = Recorded By, (t) = Taken By, (c) = Cosigned By    Initials Name Provider Type    Pat Michel, PT DPT Physical Therapist                    PT OP Goals     Row Name 06/08/21 1200          PT Short Term Goals    STG 1  patient to be indepdent with HEp as assigned at least 2-3x daily.  -     STG 1 Progress  Met  -     STG 2  patient to demo and verbalize I with finding neutral position while in seated and standing postures.   -     STG 2 Progress  Met  -     STG 3  patient to show proper TA engagement with stabilization exercises while in seated and standing postures as to help maintain neutral spine with dynamic movements.   -     STG 3 Progress  Met  -     STG 4  patient to have symmetrical alignment to pelvic ring to support proper seated posture and balance.   -     STG 4 Progress  Met  -     STG 5  pain to dec to 3/10 at worse following work day that is easily managed with rest and / or change of positions.  -     STG 5 Progress  Met  -        Long Term Goals    LTG Date to Achieve  06/24/21  -     LTG 1  subjective improvement of 70%   -     LTG 1 Progress  Met  -     LTG 2  patient to not miss work days due to pain or discomfort and perform without restrictions.  -     LTG 2 Progress  Met  -     LTG 3  patient to tolerate inc seated activity for at least 1 hour to allow for travel to [place] without rest break required due to pain.  -     LTG 3 Progress  Met  -        Time Calculation    PT Goal Re-Cert Due Date  06/24/21  -       User Key  (r) = Recorded By, (t) = Taken By, (c) = Cosigned By    Initials Name Provider Type    Pat Michel, PT DPT Physical Therapist           Therapy Education  Given: HEP, Symptoms/condition management  Program: Reinforced  How  Provided: Verbal, Demonstration  Provided to: Patient  Level of Understanding: Verbalized, Teach back education performed, Demonstrated              Time Calculation:   Start Time: 1220  Stop Time: 1300  Time Calculation (min): 40 min  Timed Charges  98083 - PT Therapeutic Exercise Minutes: 25  48924 - PT Manual Therapy Minutes: 15  Total Minutes  Timed Charges Total Minutes: 40   Total Minutes: 40  Therapy Charges for Today     Code Description Service Date Service Provider Modifiers Qty    95984986341 HC PT THER PROC EA 15 MIN 6/8/2021 Pat Perez, PT DPT GP 2    [id number] HC PT MANUAL THERAPY EA 15 MIN 6/8/2021 Pat Perez, PT DPT GP 1                    Pat Perez, PT DPT  6/8/2021

## 2021-06-15 ENCOUNTER — HOSPITAL ENCOUNTER (OUTPATIENT)
Dept: PHYSICAL THERAPY | Facility: HOSPITAL | Age: 31
Setting detail: THERAPIES SERIES
Discharge: HOME | End: 2021-06-15

## 2021-06-15 ENCOUNTER — ROUTINE PRENATAL (OUTPATIENT)
Dept: OBSTETRICS AND GYNECOLOGY | Facility: CLINIC | Age: 31
End: 2021-06-15

## 2021-06-15 VITALS — WEIGHT: 212.8 LBS | SYSTOLIC BLOOD PRESSURE: 128 MMHG | BODY MASS INDEX: 30.53 KG/M2 | DIASTOLIC BLOOD PRESSURE: 72 MMHG

## 2021-06-15 DIAGNOSIS — R00.2 HEART PALPITATIONS: ICD-10-CM

## 2021-06-15 DIAGNOSIS — Z34.83 ENCOUNTER FOR SUPERVISION OF OTHER NORMAL PREGNANCY IN THIRD TRIMESTER: Primary | ICD-10-CM

## 2021-06-15 DIAGNOSIS — Z3A.39 39 WEEKS GESTATION OF PREGNANCY: ICD-10-CM

## 2021-06-15 DIAGNOSIS — M53.3 COCCYX PAIN: Primary | ICD-10-CM

## 2021-06-15 PROCEDURE — 97110 THERAPEUTIC EXERCISES: CPT | Performed by: PHYSICAL THERAPIST

## 2021-06-15 PROCEDURE — 0502F SUBSEQUENT PRENATAL CARE: CPT | Performed by: OBSTETRICS & GYNECOLOGY

## 2021-06-15 PROCEDURE — 97140 MANUAL THERAPY 1/> REGIONS: CPT | Performed by: PHYSICAL THERAPIST

## 2021-06-15 RX ORDER — SODIUM CHLORIDE, SODIUM LACTATE, POTASSIUM CHLORIDE, CALCIUM CHLORIDE 600; 310; 30; 20 MG/100ML; MG/100ML; MG/100ML; MG/100ML
125 INJECTION, SOLUTION INTRAVENOUS CONTINUOUS
Status: CANCELLED | OUTPATIENT
Start: 2021-06-15

## 2021-06-15 RX ORDER — OXYTOCIN 10 [USP'U]/ML
2-20 INJECTION, SOLUTION INTRAMUSCULAR; INTRAVENOUS
Status: CANCELLED | OUTPATIENT
Start: 2021-06-15

## 2021-06-15 RX ORDER — IBUPROFEN 200 MG
800 TABLET ORAL EVERY 8 HOURS
Status: CANCELLED | OUTPATIENT
Start: 2021-06-15

## 2021-06-15 RX ORDER — PROMETHAZINE HYDROCHLORIDE 25 MG/1
12.5 SUPPOSITORY RECTAL EVERY 6 HOURS PRN
Status: CANCELLED | OUTPATIENT
Start: 2021-06-15

## 2021-06-15 RX ORDER — OXYTOCIN 10 [USP'U]/ML
650 INJECTION, SOLUTION INTRAMUSCULAR; INTRAVENOUS ONCE
Status: CANCELLED | OUTPATIENT
Start: 2021-06-15

## 2021-06-15 RX ORDER — OXYTOCIN 10 [USP'U]/ML
85 INJECTION, SOLUTION INTRAMUSCULAR; INTRAVENOUS ONCE
Status: CANCELLED | OUTPATIENT
Start: 2021-06-15

## 2021-06-15 RX ORDER — CARBOPROST TROMETHAMINE 250 UG/ML
250 INJECTION, SOLUTION INTRAMUSCULAR AS NEEDED
Status: CANCELLED | OUTPATIENT
Start: 2021-06-15

## 2021-06-15 RX ORDER — BUTORPHANOL TARTRATE 1 MG/ML
2 INJECTION, SOLUTION INTRAMUSCULAR; INTRAVENOUS
Status: CANCELLED | OUTPATIENT
Start: 2021-06-15

## 2021-06-15 RX ORDER — ONDANSETRON 2 MG/ML
4 INJECTION INTRAMUSCULAR; INTRAVENOUS EVERY 6 HOURS PRN
Status: CANCELLED | OUTPATIENT
Start: 2021-06-15

## 2021-06-15 RX ORDER — ONDANSETRON 4 MG/1
4 TABLET, FILM COATED ORAL EVERY 6 HOURS PRN
Status: CANCELLED | OUTPATIENT
Start: 2021-06-15

## 2021-06-15 RX ORDER — LIDOCAINE HYDROCHLORIDE 10 MG/ML
5 INJECTION, SOLUTION EPIDURAL; INFILTRATION; INTRACAUDAL; PERINEURAL AS NEEDED
Status: CANCELLED | OUTPATIENT
Start: 2021-06-15

## 2021-06-15 RX ORDER — SODIUM CHLORIDE 0.9 % (FLUSH) 0.9 %
3-10 SYRINGE (ML) INJECTION AS NEEDED
Status: CANCELLED | OUTPATIENT
Start: 2021-06-15

## 2021-06-15 RX ORDER — MISOPROSTOL 100 UG/1
800 TABLET ORAL AS NEEDED
Status: CANCELLED | OUTPATIENT
Start: 2021-06-15

## 2021-06-15 RX ORDER — SODIUM CHLORIDE 0.9 % (FLUSH) 0.9 %
3 SYRINGE (ML) INJECTION EVERY 12 HOURS SCHEDULED
Status: CANCELLED | OUTPATIENT
Start: 2021-06-15

## 2021-06-15 RX ORDER — ACETAMINOPHEN 325 MG/1
1000 TABLET ORAL EVERY 6 HOURS
Status: CANCELLED | OUTPATIENT
Start: 2021-06-15

## 2021-06-15 RX ORDER — METHYLERGONOVINE MALEATE 0.2 MG/ML
200 INJECTION INTRAVENOUS ONCE AS NEEDED
Status: CANCELLED | OUTPATIENT
Start: 2021-06-15

## 2021-06-15 RX ORDER — PROMETHAZINE HYDROCHLORIDE 25 MG/1
25 TABLET ORAL EVERY 6 HOURS PRN
Status: CANCELLED | OUTPATIENT
Start: 2021-06-15

## 2021-06-15 NOTE — THERAPY DISCHARGE NOTE
Outpatient Physical Therapy Pelvic Health Treatment Note/Discharge Summary  [facility]     Patient Name: Angélica Hooper  : 1990  MRN: [id number]  Today's Date: 6/15/2021        Visit Date: 06/15/2021   OR date: today  Visit number:   Insurance: [employer]  Improvement: 90%      Visit Dx:    ICD-10-CM ICD-9-CM   1. Coccyx pain  M53.3 724.79       Patient Active Problem List   Diagnosis   • Supervision of normal pregnancy   • Heart palpitations        Pelvic Health     Row Name 06/15/21 0900             Pregnancy Questions    Number of Pregnancies  4  -SW      Number of Miscarriages  1  -SW      Number of Children  2  -SW      How many weeks pregnant are you?  39 weeks  -SW         Pain Assessment    Pain Assessment  0-10  -SW      Pain Score  2 L side  -SW      Post Pain Score  0  -SW        User Key  (r) = Recorded By, (t) = Taken By, (c) = Cosigned By    Initials Name Provider Type    SW Pat Perez, PT DPT Physical Therapist        PT Ortho     Row Name 06/15/21 0900       Subjective Comments    Subjective Comments  Doing good.  Some additional contractions noted.  Pains noted in vaginal canal over last week.  Some back pain with cramping noted.    -SW       Subjective Pain    Able to rate subjective pain?  yes  -SW    Pre-Treatment Pain Level  2  -SW       Posture/Observations    Posture- WNL  Posture is WNL  -SW    Posture/Observations Comments  alignment level this date.  Gait without antalgia.    -SW       Neural Tension Signs- Lower Quarter Clearing    Slump  Negative  -SW    SLR  Negative  -SW       Myotomal Screen- Lower Quarter Clearing    Hip flexion (L2)  5 (Normal)  -SW    Knee extension (L3)  5 (Normal)  -SW    Knee flexion (S2)  5 (Normal)  -SW       Lumbar ROM Screen- Lower Quarter Clearing    Lumbar Flexion  Normal  -SW    Lumbar Extension  Normal  -SW    Lumbar Lateral Flexion  Normal  -SW    Lumbar Rotation  Normal  -SW       SI/Hip Screen- Lower Quarter  Clearing    Pain in Denisha's area  Left:;Positive mild ttp to L side SI continues  -SW       Lumbosacral Accessory Motions    PA glide- Sacral base  -- aligned  -SW    Innominate rotation  -- symmetrical  -SW       Lumbosacral Palpation    SI  Left:;Tender  -SW    Lumbosacral Palpation Comments  L side SI tenderness continues, but no sign of alignment issues this date  -      User Key  (r) = Recorded By, (t) = Taken By, (c) = Cosigned By    Initials Name Provider Type    Pat Michel, PT DPT Physical Therapist                     PT Assessment/Plan     Row Name 06/15/21 0900          PT Assessment    Functional Limitations  Limitation in home management;Limitations in community activities;Performance in leisure activities;Performance in self-care ADL;Performance in work activities  -     Impairments  Impaired muscle length;Impaired postural alignment;Pain;Posture  -SW     Assessment Comments  Patient has achieved all goals.  Anticipated delivery over next few days as she is currently at 39 and 2.  Pt will continue with HEP as recommended.   -SW     Rehab Potential  Good  -     Patient/caregiver participated in establishment of treatment plan and goals  Yes  -SW     Patient would benefit from skilled therapy intervention  Yes  -SW        PT Plan    PT Frequency  -- DC this date secondary to delivery anticipated next week  -SW     PT Plan Comments  DC today to begin independent home management with delivery anticipated next week.   -       User Key  (r) = Recorded By, (t) = Taken By, (c) = Cosigned By    Initials Name Provider Type    Pat Michel, PT DPT Physical Therapist              OP Exercises     Row Name 06/15/21 1018 06/15/21 0900          Subjective Comments    Subjective Comments  --  Doing good.  Some additional contractions noted.  Pains noted in vaginal canal over last week.  Some back pain with cramping noted.    -        Subjective Pain    Able to rate subjective pain?  --   yes  -SW     Pre-Treatment Pain Level  --  2  -SW        Total Minutes    72865 - PT Therapeutic Exercise Minutes  28  -SW  --     25137 - PT Manual Therapy Minutes  30  -SW  --        Exercise 1    Exercise Name 1  --  hamstring stretch   -SW     Reps 1  --  3  -SW     Time 1  --  30 sec  -SW        Exercise 2    Exercise Name 2  --  adductor stretch   -SW     Reps 2  --  3  -SW     Time 2  --  30 sec  -SW        Exercise 3    Exercise Name 3  --  physioball bounce  -SW     Time 3  --  2'min   -SW        Exercise 4    Exercise Name 4  --  physioball AP and lateral tilts  -SW     Time 4  --  2'min ea  -SW        Exercise 5    Exercise Name 5  --  physioball circles CW/CCW  -SW     Time 5  --  2'min   -SW        Exercise 6    Exercise Name 6  --  minisquats  -SW     Sets 6  --  2  -SW     Reps 6  --  10  -SW        Exercise 7    Exercise Name 7  --  hip abd in standing   -SW     Sets 7  --  2  -SW     Reps 7  --  10  -SW        Exercise 8    Exercise Name 8  --  hip ext in standing   -SW     Sets 8  --  2  -SW     Reps 8  --  10  -SW       User Key  (r) = Recorded By, (t) = Taken By, (c) = Cosigned By    Initials Name Provider Type    SW Pat Perez, PT DPT Physical Therapist           Manual Rx (last 36 hours)      Manual Treatments     Row Name 06/15/21 1018 06/15/21 0900          Total Minutes    00001 - PT Manual Therapy Minutes  30  -SW  --        Manual Rx 1    Manual Rx 1 Location  --  alignment assessment   -SW        Manual Rx 2    Manual Rx 2 Location  --  sacral float  -SW        Manual Rx 3    Manual Rx 3 Location  --  lumbosacral   -SW     Manual Rx 3 Type  --  soft tissue mobilization and cupping   -SW        Manual Rx 4    Manual Rx 4 Location  --  L piriformis mobilization   -SW       User Key  (r) = Recorded By, (t) = Taken By, (c) = Cosigned By    Initials Name Provider Type    SW Pat Perez, PT DPT Physical Therapist                    PT OP Goals     Row Name 06/15/21 0900           PT Short Term Goals    STG 1  patient to be indepdent with HEp as assigned at least 2-3x daily.  -     STG 1 Progress  Met  -     STG 2  patient to demo and verbalize I with finding neutral position while in seated and standing postures.   -     STG 2 Progress  Met  -     STG 3  patient to show proper TA engagement with stabilization exercises while in seated and standing postures as to help maintain neutral spine with dynamic movements.   -     STG 3 Progress  Met  -     STG 4  patient to have symmetrical alignment to pelvic ring to support proper seated posture and balance.   -     STG 4 Progress  Met  -     STG 5  pain to dec to 3/10 at worse following work day that is easily managed with rest and / or change of positions.  -     STG 5 Progress  Met  -        Long Term Goals    LTG Date to Achieve  -- DC today  -     LTG 1  subjective improvement of 70%   -     LTG 1 Progress  Met  -     LTG 2  patient to not miss work days due to pain or discomfort and perform without restrictions.  -     LTG 2 Progress  Met  -     LTG 3  patient to tolerate inc seated activity for at least 1 hour to allow for travel to [place] without rest break required due to pain.  -     LTG 3 Progress  Met  -        Time Calculation    PT Goal Re-Cert Due Date  -- DC today   -       User Key  (r) = Recorded By, (t) = Taken By, (c) = Cosigned By    Initials Name Provider Type    Pat Michel, PT DPT Physical Therapist           Therapy Education  Given: HEP, Symptoms/condition management  Program: Reinforced  How Provided: Verbal, Demonstration  Provided to: Patient  Level of Understanding: Teach back education performed, Verbalized, Demonstrated            Time Calculation:   Start Time: 0920  Stop Time: 1018  Time Calculation (min): 58 min  Timed Charges  01279 - PT Therapeutic Exercise Minutes: 28  06070 - PT Manual Therapy Minutes: 30  Total Minutes  Timed Charges Total Minutes: 58    Total Minutes: 58    Therapy Charges for Today     Code Description Service Date Service Provider Modifiers Qty    99608998627 HC PT THER PROC EA 15 MIN 6/15/2021 Pat Perez, PT DPT GP 2    87858162578 HC PT MANUAL THERAPY EA 15 MIN 6/15/2021 Pat Perez, PT DPT GP 2               OP PT Discharge Summary  Date of Discharge: 06/15/21  Reason for Discharge: All goals achieved  Outcomes Achieved: Able to achieve all goals within established timeline  Discharge Destination: Home with home program  Discharge Instructions/Additional Comments: continue HEP as needed.      Pat Perez, PT DPT  6/15/2021

## 2021-06-15 NOTE — H&P (VIEW-ONLY)
HISTORY & PHYSICAL - Obstetrics  [facility]    Name: Angélica Hooper  MRN: [id number]  Location: Room/bed info not found  Date: 06/15/2021  CSN: [id number]      CHIEF COMPLAINT:  EIOL    HISTORY OF PRESENT ILLNESS  Angélica Hooper is a 31 y.o.  at 39w2d presents for RPN today.  she requests induction of labor.  Today denies LOF, vaginal bleeding, or regular contractions.  Reports good FM.    Patient denies any chest pain, palpitations, headaches, lightheadedness, shortness of breath, cough, nausea, vomiting, diarrhea, constipation, fever, or chills.    ROS  Review of Systems   Constitutional: Negative.    HENT: Negative.    Eyes: Negative.    Respiratory: Negative.    Cardiovascular: Negative.    Gastrointestinal: Negative.    Endocrine: Negative.    Genitourinary: Negative.    Musculoskeletal: Negative.    Skin: Negative.    Neurological: Negative.    Hematological: Negative.    Psychiatric/Behavioral: Negative.      PRENATAL LAB RESULTS  Prenatal labs reviewed  External Prenatal Results     Pregnancy Outside Results - Transcribed From Office Records - See Scanned Records For Details     Test Value Date Time    ABO  O  20 1512    Rh  Positive  20 1512    Antibody Screen  Negative  20 1512    Varicella IgG  Positive  20 1512    Rubella  Positive  20 1512    Hgb  12.8 g/dL 21 0805       13.2 g/dL 20 1512    Hct  36.3 % 21 0805       38.7 % 20 1512    Glucose Fasting GTT       Glucose Tolerance Test 1 hour       Glucose Tolerance Test 3 hour       Gonorrhea (discrete)  Negative  20 1510    Chlamydia (discrete)  Negative  20 1510    RPR  Non-Reactive  20 1512    VDRL       Syphilis Antibody       HBsAg  Non-Reactive  20 1512    Herpes Simplex Virus PCR       Herpes Simplex VIrus Culture       HIV  Non-Reactive  20 1512    Hep C RNA Quant PCR       Hep C Antibody  Non-Reactive  20 1512    AFP        Group B Strep  Negative  21 1604    GBS Susceptibility to Clindamycin       GBS Susceptibility to Erythromycin       Fetal Fibronectin       Genetic Testing, Maternal Blood             Drug Screening     Test Value Date Time    Urine Drug Screen       Amphetamine Screen  Negative  20 1510    Barbiturate Screen  Negative  20 1510    Benzodiazepine Screen  Negative  20 1510    Methadone Screen  Negative  20 1510    Phencyclidine Screen  Negative  20 1510    Opiates Screen  Negative  20 1510    THC Screen  Negative  20 1510    Cocaine Screen       Propoxyphene Screen  Negative  20 1510    Buprenorphine Screen  Negative  20 1510    Methamphetamine Screen       Oxycodone Screen  Negative  20 1510    Tricyclic Antidepressants Screen  Negative  20 1510          Legend    ^: Historical                      PRENATAL RISK FACTORS   Problems (from 20 to present)     Problem Noted Resolved    Supervision of normal pregnancy 2020 by Usha Villaseñor APRN No    Heart palpitations 2020 by Usha Villaseñor APRN No        OB HISTORY  OB History    Para Term  AB Living   4 2 2   1 2   SAB TAB Ectopic Molar Multiple Live Births   1         2      # Outcome Date GA Lbr Yoseph/2nd Weight Sex Delivery Anes PTL Lv   4 Current            3 Term 18 38w6d 11:15 / 00:15 3260 g (7 lb 3 oz) M Vag-Spont EPI N LEOPOLDO   2 SAB 2017 5w0d          1 Term 12/30/15 39w6d  3600 g (7 lb 15 oz) M Vag-Spont   LEOPOLDO      Complications: Perineal laceration, second degree     GYN HISTORY  Denies h/o sexually transmitted infections/pelvic inflammatory disease  Denies h/o gynecologic surgeries, including biopsies of the cervix    PAST MEDICAL HISTORY  Past Medical History:   Diagnosis Date   • Depression    • Palpitations      PAST SURGICAL HISTORY  Past Surgical History:   Procedure Laterality Date   • ADENOIDECTOMY     •  TONSILLECTOMY     • WISDOM TOOTH EXTRACTION       FAMILY HISTORY  Family History   Problem Relation Age of Onset   • Hypertension Father    • Idiopathic pulmonary fibrosis Father    • Heart disease Mother    • Hypertension Brother    • Idiopathic pulmonary fibrosis Paternal Grandmother    • Heart attack Maternal Grandmother    • Bone cancer Maternal Grandfather    • Cirrhosis Maternal Grandfather    • No Known Problems Son    • No Known Problems Son      SOCIAL HISTORY  Social History     Socioeconomic History   • Marital status:      Spouse name: Not on file   • Number of children: Not on file   • Years of education: Not on file   • Highest education level: Not on file   Tobacco Use   • Smoking status: Former Smoker   • Smokeless tobacco: Never Used   Substance and Sexual Activity   • Alcohol use: No   • Drug use: No   • Sexual activity: Yes     Partners: Male     Birth control/protection: Pill     Comment: LAST PAP SMEAR 2017 negative     ALLERGIES  Allergies   Allergen Reactions   • Penicillins Rash     Rash     HOME MEDICATIONS  Prior to Admission medications    Medication Sig Start Date End Date Taking? Authorizing Provider   magnesium oxide (MAG-OX) 400 MG tablet Take 400 mg by mouth.   Yes ProviderAlina MD   Prenatal Vit-Fe Fumarate-FA (PRENATAL, CLASSIC, VITAMIN) 28-0.8 MG tablet tablet Take  by mouth Daily.   Yes ProviderAlina MD     PHYSICAL EXAM  /72   Wt 96.5 kg (212 lb 12.8 oz)   LMP 2020 (Exact Date)   BMI 30.53 kg/m²   General: No acute distress. Well developed, well nourished. Pleasant.  Heart: Regular rate and rhythm. No murmurs, rubs, or gallops  Lungs: Clear to auscultation bilaterally. No wheezes, rales, or rhonchi.  Abdomen: Soft, nontender to palpation, enlarged by gravid uterus.    SVE: / -3/ soft/ mid, vertex    IMPRESSION  Angélica Hooper is a 31 y.o.  at 39w2d scheduled for EIOL at 39w4d.  Will induce with pitocin given favorable  cervix..    PLAN  1.  EIOL  - Admit: Labor and Delivery  - Attending: Dr. Ramos  - Condition: Stable  - Vitals: per protocol  - Activity: ad elle  - Nursing: Continuous electronic fetal monitoring, as per protocol  - Diet: Clears  - IV fluids:  mL/hr  - Meds: Pitocin  - Allergies: PCN (rash)  - Labs: CBC, T&S, UDS  - GBS: neg.  Antibiotics:  N/A  - Angélica Loretta Hooper and I have discussed pain goals for this hospitalization after reviewing her current clinical condition, medical history and prior pain experiences.  The goal is to keep her pain level appropriate.  Patient does desire an epidural  - Anticipate     This document has been electronically signed by Lisa Ramos MD on Veena 15, 2021 12:23 CDT.

## 2021-06-15 NOTE — H&P
HISTORY & PHYSICAL - Obstetrics  [facility]    Name: Angélica Hooper  MRN: [id number]  Location: Room/bed info not found  Date: 06/15/2021  CSN: [id number]      CHIEF COMPLAINT:  EIOL    HISTORY OF PRESENT ILLNESS  Angélica Hooper is a 31 y.o.  at 39w2d presents for RPN today.  she requests induction of labor.  Today denies LOF, vaginal bleeding, or regular contractions.  Reports good FM.    Patient denies any chest pain, palpitations, headaches, lightheadedness, shortness of breath, cough, nausea, vomiting, diarrhea, constipation, fever, or chills.    ROS  Review of Systems   Constitutional: Negative.    HENT: Negative.    Eyes: Negative.    Respiratory: Negative.    Cardiovascular: Negative.    Gastrointestinal: Negative.    Endocrine: Negative.    Genitourinary: Negative.    Musculoskeletal: Negative.    Skin: Negative.    Neurological: Negative.    Hematological: Negative.    Psychiatric/Behavioral: Negative.      PRENATAL LAB RESULTS  Prenatal labs reviewed  External Prenatal Results     Pregnancy Outside Results - Transcribed From Office Records - See Scanned Records For Details     Test Value Date Time    ABO  O  20 1512    Rh  Positive  20 1512    Antibody Screen  Negative  20 1512    Varicella IgG  Positive  20 1512    Rubella  Positive  20 1512    Hgb  12.8 g/dL 21 0805       13.2 g/dL 20 1512    Hct  36.3 % 21 0805       38.7 % 20 1512    Glucose Fasting GTT       Glucose Tolerance Test 1 hour       Glucose Tolerance Test 3 hour       Gonorrhea (discrete)  Negative  20 1510    Chlamydia (discrete)  Negative  20 1510    RPR  Non-Reactive  20 1512    VDRL       Syphilis Antibody       HBsAg  Non-Reactive  20 1512    Herpes Simplex Virus PCR       Herpes Simplex VIrus Culture       HIV  Non-Reactive  20 1512    Hep C RNA Quant PCR       Hep C Antibody  Non-Reactive  20 1512    AFP        Group B Strep  Negative  21 1604    GBS Susceptibility to Clindamycin       GBS Susceptibility to Erythromycin       Fetal Fibronectin       Genetic Testing, Maternal Blood             Drug Screening     Test Value Date Time    Urine Drug Screen       Amphetamine Screen  Negative  20 1510    Barbiturate Screen  Negative  20 1510    Benzodiazepine Screen  Negative  20 1510    Methadone Screen  Negative  20 1510    Phencyclidine Screen  Negative  20 1510    Opiates Screen  Negative  20 1510    THC Screen  Negative  20 1510    Cocaine Screen       Propoxyphene Screen  Negative  20 1510    Buprenorphine Screen  Negative  20 1510    Methamphetamine Screen       Oxycodone Screen  Negative  20 1510    Tricyclic Antidepressants Screen  Negative  20 1510          Legend    ^: Historical                      PRENATAL RISK FACTORS   Problems (from 20 to present)     Problem Noted Resolved    Supervision of normal pregnancy 2020 by Usha Villaseñor APRN No    Heart palpitations 2020 by Usha Villaseñor APRN No        OB HISTORY  OB History    Para Term  AB Living   4 2 2   1 2   SAB TAB Ectopic Molar Multiple Live Births   1         2      # Outcome Date GA Lbr Yoseph/2nd Weight Sex Delivery Anes PTL Lv   4 Current            3 Term 18 38w6d 11:15 / 00:15 3260 g (7 lb 3 oz) M Vag-Spont EPI N LEOPOLDO   2 SAB 2017 5w0d          1 Term 12/30/15 39w6d  3600 g (7 lb 15 oz) M Vag-Spont   LEOPOLDO      Complications: Perineal laceration, second degree     GYN HISTORY  Denies h/o sexually transmitted infections/pelvic inflammatory disease  Denies h/o gynecologic surgeries, including biopsies of the cervix    PAST MEDICAL HISTORY  Past Medical History:   Diagnosis Date   • Depression    • Palpitations      PAST SURGICAL HISTORY  Past Surgical History:   Procedure Laterality Date   • ADENOIDECTOMY     •  TONSILLECTOMY     • WISDOM TOOTH EXTRACTION       FAMILY HISTORY  Family History   Problem Relation Age of Onset   • Hypertension Father    • Idiopathic pulmonary fibrosis Father    • Heart disease Mother    • Hypertension Brother    • Idiopathic pulmonary fibrosis Paternal Grandmother    • Heart attack Maternal Grandmother    • Bone cancer Maternal Grandfather    • Cirrhosis Maternal Grandfather    • No Known Problems Son    • No Known Problems Son      SOCIAL HISTORY  Social History     Socioeconomic History   • Marital status:      Spouse name: Not on file   • Number of children: Not on file   • Years of education: Not on file   • Highest education level: Not on file   Tobacco Use   • Smoking status: Former Smoker   • Smokeless tobacco: Never Used   Substance and Sexual Activity   • Alcohol use: No   • Drug use: No   • Sexual activity: Yes     Partners: Male     Birth control/protection: Pill     Comment: LAST PAP SMEAR 2017 negative     ALLERGIES  Allergies   Allergen Reactions   • Penicillins Rash     Rash     HOME MEDICATIONS  Prior to Admission medications    Medication Sig Start Date End Date Taking? Authorizing Provider   magnesium oxide (MAG-OX) 400 MG tablet Take 400 mg by mouth.   Yes ProviderAlina MD   Prenatal Vit-Fe Fumarate-FA (PRENATAL, CLASSIC, VITAMIN) 28-0.8 MG tablet tablet Take  by mouth Daily.   Yes ProviderAlina MD     PHYSICAL EXAM  /72   Wt 96.5 kg (212 lb 12.8 oz)   LMP 2020 (Exact Date)   BMI 30.53 kg/m²   General: No acute distress. Well developed, well nourished. Pleasant.  Heart: Regular rate and rhythm. No murmurs, rubs, or gallops  Lungs: Clear to auscultation bilaterally. No wheezes, rales, or rhonchi.  Abdomen: Soft, nontender to palpation, enlarged by gravid uterus.    SVE: / -3/ soft/ mid, vertex    IMPRESSION  Angélica Hooper is a 31 y.o.  at 39w2d scheduled for EIOL at 39w4d.  Will induce with pitocin given favorable  cervix..    PLAN  1.  EIOL  - Admit: Labor and Delivery  - Attending: Dr. Ramos  - Condition: Stable  - Vitals: per protocol  - Activity: ad elle  - Nursing: Continuous electronic fetal monitoring, as per protocol  - Diet: Clears  - IV fluids:  mL/hr  - Meds: Pitocin  - Allergies: PCN (rash)  - Labs: CBC, T&S, UDS  - GBS: neg.  Antibiotics:  N/A  - Angélica Loretta Hooper and I have discussed pain goals for this hospitalization after reviewing her current clinical condition, medical history and prior pain experiences.  The goal is to keep her pain level appropriate.  Patient does desire an epidural  - Anticipate     This document has been electronically signed by Lisa Ramos MD on Veena 15, 2021 12:23 CDT.

## 2021-06-15 NOTE — PROGRESS NOTES
CC: Prenatal visit    Angélica Hooper is a 31 y.o.  at 39w2d.  Doing well.  Denies contractions, LOF, or VB.  Reports good FM.    /72   Wt 96.5 kg (212 lb 12.8 oz)   LMP 2020 (Exact Date)   BMI 30.53 kg/m²   SVE: 4-5/6/-3/soft/mid, vertex  Fundal Height (cm): 37 cm  Fetal Heart Rate: 146     Problems (from 20 to present)     Problem Noted Resolved    Supervision of normal pregnancy 2020 by Usha Villaseñor, NEO No    Heart palpitations 2020 by Usha Villaseñor, NEO No        A/P: Angélica Hooper is a 31 y.o.  at 39w2d.  - EIOL scheduled Thursday, pitocin  - Reviewed COVID-19 visitation policy  - Reviewed COVID-19 precautions     Diagnosis Plan   1. Encounter for supervision of other normal pregnancy in third trimester     2. Heart palpitations     3. 39 weeks gestation of pregnancy       Lisa Ramos MD  6/15/2021  12:20 CDT

## 2021-06-17 ENCOUNTER — HOSPITAL ENCOUNTER (INPATIENT)
Dept: LABOR AND DELIVERY | Facility: HOSPITAL | Age: 31
Discharge: HOME | End: 2021-06-17

## 2021-06-17 ENCOUNTER — ANESTHESIA (OUTPATIENT)
Dept: LABOR AND DELIVERY | Facility: HOSPITAL | Age: 31
End: 2021-06-17

## 2021-06-17 ENCOUNTER — HOSPITAL ENCOUNTER (INPATIENT)
Facility: HOSPITAL | Age: 31
LOS: 1 days | Discharge: HOME | End: 2021-06-18
Attending: OBSTETRICS & GYNECOLOGY | Admitting: OBSTETRICS & GYNECOLOGY

## 2021-06-17 ENCOUNTER — ANESTHESIA EVENT (OUTPATIENT)
Dept: LABOR AND DELIVERY | Facility: HOSPITAL | Age: 31
End: 2021-06-17

## 2021-06-17 DIAGNOSIS — Z34.83 ENCOUNTER FOR SUPERVISION OF OTHER NORMAL PREGNANCY IN THIRD TRIMESTER: ICD-10-CM

## 2021-06-17 PROBLEM — Z34.90 ENCOUNTER FOR ELECTIVE INDUCTION OF LABOR: Status: ACTIVE | Noted: 2021-06-17

## 2021-06-17 LAB
ABO GROUP BLD: NORMAL
ALBUMIN SERPL-MCNC: 3.3 G/DL (ref 3.5–5.2)
ALBUMIN/GLOB SERPL: 1.1 G/DL
ALP SERPL-CCNC: 93 U/L (ref 39–117)
ALT SERPL W P-5'-P-CCNC: 9 U/L (ref 1–33)
AMPHET+METHAMPHET UR QL: NEGATIVE
AMPHETAMINES UR QL: NEGATIVE
ANION GAP SERPL CALCULATED.3IONS-SCNC: 9 MMOL/L (ref 5–15)
AST SERPL-CCNC: 14 U/L (ref 1–32)
BACTERIA UR QL AUTO: ABNORMAL /HPF
BARBITURATES UR QL SCN: NEGATIVE
BENZODIAZ UR QL SCN: NEGATIVE
BILIRUB SERPL-MCNC: <0.2 MG/DL (ref 0–1.2)
BILIRUB UR QL STRIP: NEGATIVE
BLD GP AB SCN SERPL QL: NEGATIVE
BUN SERPL-MCNC: 7 MG/DL (ref 6–20)
BUN/CREAT SERPL: 13.2 (ref 7–25)
BUPRENORPHINE SERPL-MCNC: NEGATIVE NG/ML
CALCIUM SPEC-SCNC: 8.7 MG/DL (ref 8.6–10.5)
CANNABINOIDS SERPL QL: NEGATIVE
CHLORIDE SERPL-SCNC: 99 MMOL/L (ref 98–107)
CLARITY UR: ABNORMAL
CO2 SERPL-SCNC: 23 MMOL/L (ref 22–29)
COCAINE UR QL: NEGATIVE
COLOR UR: YELLOW
CREAT SERPL-MCNC: 0.53 MG/DL (ref 0.57–1)
DEPRECATED RDW RBC AUTO: 44.7 FL (ref 37–54)
ERYTHROCYTE [DISTWIDTH] IN BLOOD BY AUTOMATED COUNT: 14 % (ref 12.3–15.4)
GFR SERPL CREATININE-BSD FRML MDRD: 135 ML/MIN/1.73
GLOBULIN UR ELPH-MCNC: 3 GM/DL
GLUCOSE SERPL-MCNC: 100 MG/DL (ref 65–99)
GLUCOSE UR STRIP-MCNC: NEGATIVE MG/DL
HCT VFR BLD AUTO: 34.2 % (ref 34–46.6)
HGB BLD-MCNC: 11.6 G/DL (ref 12–15.9)
HGB UR QL STRIP.AUTO: NEGATIVE
HOLD SPECIMEN: NORMAL
HYALINE CASTS UR QL AUTO: ABNORMAL /LPF
KETONES UR QL STRIP: NEGATIVE
LEUKOCYTE ESTERASE UR QL STRIP.AUTO: ABNORMAL
Lab: NORMAL
MCH RBC QN AUTO: 30.1 PG (ref 26.6–33)
MCHC RBC AUTO-ENTMCNC: 33.9 G/DL (ref 31.5–35.7)
MCV RBC AUTO: 88.8 FL (ref 79–97)
METHADONE UR QL SCN: NEGATIVE
NITRITE UR QL STRIP: NEGATIVE
OPIATES UR QL: NEGATIVE
OXYCODONE UR QL SCN: NEGATIVE
PCP UR QL SCN: NEGATIVE
PH UR STRIP.AUTO: 6 [PH] (ref 5–9)
PLATELET # BLD AUTO: 165 10*3/MM3 (ref 140–450)
PMV BLD AUTO: 11 FL (ref 6–12)
POTASSIUM SERPL-SCNC: 3.1 MMOL/L (ref 3.5–5.2)
PROPOXYPH UR QL: NEGATIVE
PROT SERPL-MCNC: 6.3 G/DL (ref 6–8.5)
PROT UR QL STRIP: NEGATIVE
RBC # BLD AUTO: 3.85 10*6/MM3 (ref 3.77–5.28)
RBC # UR: ABNORMAL /HPF
REF LAB TEST METHOD: ABNORMAL
RH BLD: POSITIVE
SODIUM SERPL-SCNC: 131 MMOL/L (ref 136–145)
SP GR UR STRIP: 1.02 (ref 1–1.03)
SQUAMOUS #/AREA URNS HPF: ABNORMAL /HPF
T&S EXPIRATION DATE: NORMAL
TRICYCLICS UR QL SCN: NEGATIVE
UROBILINOGEN UR QL STRIP: ABNORMAL
WBC # BLD AUTO: 6.89 10*3/MM3 (ref 3.4–10.8)
WBC UR QL AUTO: ABNORMAL /HPF

## 2021-06-17 PROCEDURE — 3E033VJ INTRODUCTION OF OTHER HORMONE INTO PERIPHERAL VEIN, PERCUTANEOUS APPROACH: ICD-10-PCS | Performed by: OBSTETRICS & GYNECOLOGY

## 2021-06-17 PROCEDURE — 59400 OBSTETRICAL CARE: CPT | Performed by: OBSTETRICS & GYNECOLOGY

## 2021-06-17 PROCEDURE — 51703 INSERT BLADDER CATH COMPLEX: CPT

## 2021-06-17 PROCEDURE — 86900 BLOOD TYPING SEROLOGIC ABO: CPT | Performed by: OBSTETRICS & GYNECOLOGY

## 2021-06-17 PROCEDURE — 80306 DRUG TEST PRSMV INSTRMNT: CPT | Performed by: OBSTETRICS & GYNECOLOGY

## 2021-06-17 PROCEDURE — 25010000003 LIDOCAINE 1 % SOLUTION: Performed by: NURSE ANESTHETIST, CERTIFIED REGISTERED

## 2021-06-17 PROCEDURE — 86901 BLOOD TYPING SEROLOGIC RH(D): CPT | Performed by: OBSTETRICS & GYNECOLOGY

## 2021-06-17 PROCEDURE — 85027 COMPLETE CBC AUTOMATED: CPT | Performed by: OBSTETRICS & GYNECOLOGY

## 2021-06-17 PROCEDURE — 0KQM0ZZ REPAIR PERINEUM MUSCLE, OPEN APPROACH: ICD-10-PCS | Performed by: OBSTETRICS & GYNECOLOGY

## 2021-06-17 PROCEDURE — 81001 URINALYSIS AUTO W/SCOPE: CPT | Performed by: OBSTETRICS & GYNECOLOGY

## 2021-06-17 PROCEDURE — 80053 COMPREHEN METABOLIC PANEL: CPT | Performed by: OBSTETRICS & GYNECOLOGY

## 2021-06-17 PROCEDURE — 86850 RBC ANTIBODY SCREEN: CPT | Performed by: OBSTETRICS & GYNECOLOGY

## 2021-06-17 PROCEDURE — C1755 CATHETER, INTRASPINAL: HCPCS

## 2021-06-17 PROCEDURE — C1755 CATHETER, INTRASPINAL: HCPCS | Performed by: NURSE ANESTHETIST, CERTIFIED REGISTERED

## 2021-06-17 RX ORDER — LANOLIN 100 %
OINTMENT (GRAM) TOPICAL
Status: DISCONTINUED | OUTPATIENT
Start: 2021-06-17 | End: 2021-06-18 | Stop reason: HOSPADM

## 2021-06-17 RX ORDER — LIDOCAINE HYDROCHLORIDE AND EPINEPHRINE 15; 5 MG/ML; UG/ML
INJECTION, SOLUTION EPIDURAL AS NEEDED
Status: DISCONTINUED | OUTPATIENT
Start: 2021-06-17 | End: 2021-06-17 | Stop reason: SURG

## 2021-06-17 RX ORDER — MISOPROSTOL 200 UG/1
800 TABLET ORAL AS NEEDED
Status: DISCONTINUED | OUTPATIENT
Start: 2021-06-17 | End: 2021-06-17 | Stop reason: HOSPADM

## 2021-06-17 RX ORDER — OXYTOCIN/0.9 % SODIUM CHLORIDE 30/500 ML
85 PLASTIC BAG, INJECTION (ML) INTRAVENOUS ONCE
Status: DISCONTINUED | OUTPATIENT
Start: 2021-06-17 | End: 2021-06-17 | Stop reason: HOSPADM

## 2021-06-17 RX ORDER — ONDANSETRON 4 MG/1
4 TABLET, FILM COATED ORAL EVERY 6 HOURS PRN
Status: DISCONTINUED | OUTPATIENT
Start: 2021-06-17 | End: 2021-06-17 | Stop reason: HOSPADM

## 2021-06-17 RX ORDER — BISACODYL 10 MG
10 SUPPOSITORY, RECTAL RECTAL DAILY PRN
Status: DISCONTINUED | OUTPATIENT
Start: 2021-06-18 | End: 2021-06-18 | Stop reason: HOSPADM

## 2021-06-17 RX ORDER — SODIUM CHLORIDE 0.9 % (FLUSH) 0.9 %
3-10 SYRINGE (ML) INJECTION AS NEEDED
Status: DISCONTINUED | OUTPATIENT
Start: 2021-06-17 | End: 2021-06-17 | Stop reason: HOSPADM

## 2021-06-17 RX ORDER — OXYTOCIN/0.9 % SODIUM CHLORIDE 30/500 ML
650 PLASTIC BAG, INJECTION (ML) INTRAVENOUS ONCE
Status: DISCONTINUED | OUTPATIENT
Start: 2021-06-17 | End: 2021-06-17 | Stop reason: HOSPADM

## 2021-06-17 RX ORDER — METHYLERGONOVINE MALEATE 0.2 MG/ML
200 INJECTION INTRAVENOUS ONCE AS NEEDED
Status: DISCONTINUED | OUTPATIENT
Start: 2021-06-17 | End: 2021-06-17 | Stop reason: HOSPADM

## 2021-06-17 RX ORDER — ONDANSETRON 4 MG/1
4 TABLET, FILM COATED ORAL EVERY 6 HOURS PRN
Status: DISCONTINUED | OUTPATIENT
Start: 2021-06-17 | End: 2021-06-18 | Stop reason: HOSPADM

## 2021-06-17 RX ORDER — CARBOPROST TROMETHAMINE 250 UG/ML
250 INJECTION, SOLUTION INTRAMUSCULAR AS NEEDED
Status: DISCONTINUED | OUTPATIENT
Start: 2021-06-17 | End: 2021-06-17 | Stop reason: HOSPADM

## 2021-06-17 RX ORDER — BUPIVACAINE HYDROCHLORIDE 2.5 MG/ML
INJECTION, SOLUTION EPIDURAL; INFILTRATION; INTRACAUDAL AS NEEDED
Status: DISCONTINUED | OUTPATIENT
Start: 2021-06-17 | End: 2021-06-17 | Stop reason: SURG

## 2021-06-17 RX ORDER — LIDOCAINE HYDROCHLORIDE 10 MG/ML
INJECTION, SOLUTION INFILTRATION; PERINEURAL AS NEEDED
Status: DISCONTINUED | OUTPATIENT
Start: 2021-06-17 | End: 2021-06-17 | Stop reason: SURG

## 2021-06-17 RX ORDER — SODIUM CHLORIDE, SODIUM LACTATE, POTASSIUM CHLORIDE, CALCIUM CHLORIDE 600; 310; 30; 20 MG/100ML; MG/100ML; MG/100ML; MG/100ML
INJECTION, SOLUTION INTRAVENOUS
Status: COMPLETED
Start: 2021-06-17 | End: 2021-06-17

## 2021-06-17 RX ORDER — ONDANSETRON 2 MG/ML
4 INJECTION INTRAMUSCULAR; INTRAVENOUS EVERY 6 HOURS PRN
Status: DISCONTINUED | OUTPATIENT
Start: 2021-06-17 | End: 2021-06-17 | Stop reason: HOSPADM

## 2021-06-17 RX ORDER — ACETAMINOPHEN 500 MG
1000 TABLET ORAL EVERY 6 HOURS
Status: DISCONTINUED | OUTPATIENT
Start: 2021-06-17 | End: 2021-06-17 | Stop reason: HOSPADM

## 2021-06-17 RX ORDER — DOCUSATE SODIUM 100 MG/1
100 CAPSULE, LIQUID FILLED ORAL 2 TIMES DAILY
Status: DISCONTINUED | OUTPATIENT
Start: 2021-06-17 | End: 2021-06-18 | Stop reason: HOSPADM

## 2021-06-17 RX ORDER — OXYTOCIN/0.9 % SODIUM CHLORIDE 30/500 ML
85 PLASTIC BAG, INJECTION (ML) INTRAVENOUS ONCE
Status: DISCONTINUED | OUTPATIENT
Start: 2021-06-17 | End: 2021-06-18 | Stop reason: HOSPADM

## 2021-06-17 RX ORDER — OXYTOCIN/0.9 % SODIUM CHLORIDE 30/500 ML
650 PLASTIC BAG, INJECTION (ML) INTRAVENOUS ONCE
Status: DISCONTINUED | OUTPATIENT
Start: 2021-06-17 | End: 2021-06-18 | Stop reason: HOSPADM

## 2021-06-17 RX ORDER — SODIUM CHLORIDE, SODIUM LACTATE, POTASSIUM CHLORIDE, CALCIUM CHLORIDE 600; 310; 30; 20 MG/100ML; MG/100ML; MG/100ML; MG/100ML
125 INJECTION, SOLUTION INTRAVENOUS CONTINUOUS
Status: DISCONTINUED | OUTPATIENT
Start: 2021-06-17 | End: 2021-06-17

## 2021-06-17 RX ORDER — IBUPROFEN 800 MG/1
800 TABLET ORAL EVERY 8 HOURS
Status: DISCONTINUED | OUTPATIENT
Start: 2021-06-17 | End: 2021-06-18 | Stop reason: HOSPADM

## 2021-06-17 RX ORDER — HYDROCORTISONE 25 MG/G
1 CREAM TOPICAL AS NEEDED
Status: DISCONTINUED | OUTPATIENT
Start: 2021-06-17 | End: 2021-06-18 | Stop reason: HOSPADM

## 2021-06-17 RX ORDER — ONDANSETRON 2 MG/ML
4 INJECTION INTRAMUSCULAR; INTRAVENOUS EVERY 6 HOURS PRN
Status: DISCONTINUED | OUTPATIENT
Start: 2021-06-17 | End: 2021-06-18 | Stop reason: HOSPADM

## 2021-06-17 RX ORDER — PRENATAL VIT/IRON FUM/FOLIC AC 27MG-0.8MG
1 TABLET ORAL DAILY
Status: DISCONTINUED | OUTPATIENT
Start: 2021-06-17 | End: 2021-06-18 | Stop reason: HOSPADM

## 2021-06-17 RX ORDER — ACETAMINOPHEN 500 MG
1000 TABLET ORAL EVERY 6 HOURS
Status: DISCONTINUED | OUTPATIENT
Start: 2021-06-17 | End: 2021-06-18 | Stop reason: HOSPADM

## 2021-06-17 RX ORDER — OXYTOCIN/0.9 % SODIUM CHLORIDE 30/500 ML
2-20 PLASTIC BAG, INJECTION (ML) INTRAVENOUS
Status: DISCONTINUED | OUTPATIENT
Start: 2021-06-17 | End: 2021-06-17 | Stop reason: HOSPADM

## 2021-06-17 RX ORDER — SODIUM CHLORIDE 0.9 % (FLUSH) 0.9 %
3 SYRINGE (ML) INJECTION EVERY 12 HOURS SCHEDULED
Status: DISCONTINUED | OUTPATIENT
Start: 2021-06-17 | End: 2021-06-17 | Stop reason: HOSPADM

## 2021-06-17 RX ORDER — IBUPROFEN 800 MG/1
800 TABLET ORAL EVERY 8 HOURS
Status: DISCONTINUED | OUTPATIENT
Start: 2021-06-17 | End: 2021-06-17 | Stop reason: HOSPADM

## 2021-06-17 RX ORDER — PROMETHAZINE HYDROCHLORIDE 25 MG/1
25 TABLET ORAL EVERY 6 HOURS PRN
Status: DISCONTINUED | OUTPATIENT
Start: 2021-06-17 | End: 2021-06-17 | Stop reason: HOSPADM

## 2021-06-17 RX ORDER — FERROUS SULFATE TAB EC 324 MG (65 MG FE EQUIVALENT) 324 (65 FE) MG
324 TABLET DELAYED RESPONSE ORAL 2 TIMES DAILY WITH MEALS
Status: DISCONTINUED | OUTPATIENT
Start: 2021-06-17 | End: 2021-06-18 | Stop reason: HOSPADM

## 2021-06-17 RX ORDER — LIDOCAINE HYDROCHLORIDE 10 MG/ML
5 INJECTION, SOLUTION EPIDURAL; INFILTRATION; INTRACAUDAL; PERINEURAL AS NEEDED
Status: DISCONTINUED | OUTPATIENT
Start: 2021-06-17 | End: 2021-06-17 | Stop reason: HOSPADM

## 2021-06-17 RX ORDER — CALCIUM CARBONATE 200(500)MG
2 TABLET,CHEWABLE ORAL 3 TIMES DAILY PRN
Status: DISCONTINUED | OUTPATIENT
Start: 2021-06-17 | End: 2021-06-18 | Stop reason: HOSPADM

## 2021-06-17 RX ORDER — SODIUM CHLORIDE 0.9 % (FLUSH) 0.9 %
1-10 SYRINGE (ML) INJECTION AS NEEDED
Status: DISCONTINUED | OUTPATIENT
Start: 2021-06-17 | End: 2021-06-18 | Stop reason: HOSPADM

## 2021-06-17 RX ORDER — PROMETHAZINE HYDROCHLORIDE 12.5 MG/1
12.5 SUPPOSITORY RECTAL EVERY 6 HOURS PRN
Status: DISCONTINUED | OUTPATIENT
Start: 2021-06-17 | End: 2021-06-17 | Stop reason: HOSPADM

## 2021-06-17 RX ADMIN — SODIUM CHLORIDE, POTASSIUM CHLORIDE, SODIUM LACTATE AND CALCIUM CHLORIDE 125 ML/HR: 600; 310; 30; 20 INJECTION, SOLUTION INTRAVENOUS at 04:36

## 2021-06-17 RX ADMIN — ACETAMINOPHEN 1000 MG: 500 TABLET, FILM COATED ORAL at 22:34

## 2021-06-17 RX ADMIN — BUPIVACAINE HYDROCHLORIDE 5 ML: 2.5 INJECTION, SOLUTION EPIDURAL; INFILTRATION; INTRACAUDAL; PERINEURAL at 10:03

## 2021-06-17 RX ADMIN — SODIUM CHLORIDE, POTASSIUM CHLORIDE, SODIUM LACTATE AND CALCIUM CHLORIDE 125 ML/HR: 600; 310; 30; 20 INJECTION, SOLUTION INTRAVENOUS at 09:15

## 2021-06-17 RX ADMIN — ACETAMINOPHEN 1000 MG: 500 TABLET, FILM COATED ORAL at 16:24

## 2021-06-17 RX ADMIN — LIDOCAINE HYDROCHLORIDE 3 ML: 10 INJECTION, SOLUTION INFILTRATION; PERINEURAL at 09:40

## 2021-06-17 RX ADMIN — BUPIVACAINE HYDROCHLORIDE 10 ML: 2.5 INJECTION, SOLUTION EPIDURAL; INFILTRATION; INTRACAUDAL; PERINEURAL at 12:15

## 2021-06-17 RX ADMIN — FERROUS SULFATE TAB EC 324 MG (65 MG FE EQUIVALENT) 324 MG: 324 (65 FE) TABLET DELAYED RESPONSE at 17:32

## 2021-06-17 RX ADMIN — IBUPROFEN 800 MG: 800 TABLET, FILM COATED ORAL at 16:24

## 2021-06-17 RX ADMIN — LIDOCAINE HYDROCHLORIDE AND EPINEPHRINE 3 ML: 15; 5 INJECTION, SOLUTION EPIDURAL at 09:57

## 2021-06-17 RX ADMIN — BENZOCAINE AND LEVOMENTHOL: 200; 5 SPRAY TOPICAL at 19:45

## 2021-06-17 RX ADMIN — OXYTOCIN-SODIUM CHLORIDE 0.9% IV SOLN 30 UNIT/500ML 2 MILLI-UNITS/MIN: 30-0.9/5 SOLUTION at 10:54

## 2021-06-17 RX ADMIN — Medication 10 ML/HR: at 10:07

## 2021-06-17 RX ADMIN — BUPIVACAINE HYDROCHLORIDE 5 ML: 2.5 INJECTION, SOLUTION EPIDURAL; INFILTRATION; INTRACAUDAL; PERINEURAL at 10:05

## 2021-06-17 RX ADMIN — DOCUSATE SODIUM 100 MG: 100 CAPSULE, LIQUID FILLED ORAL at 20:41

## 2021-06-17 RX ADMIN — OXYTOCIN-SODIUM CHLORIDE 0.9% IV SOLN 30 UNIT/500ML 2 MILLI-UNITS/MIN: 30-0.9/5 SOLUTION at 05:01

## 2021-06-17 NOTE — INTERVAL H&P NOTE
"H&P reviewed. The patient was examined and there are no changes to the H&P. No concerns. BP elevated on admission.    /90   Pulse 83   Temp 97.9 °F (36.6 °C) (Oral)   Resp 18   Ht 177.8 cm (70\")   Wt 96.6 kg (213 lb)   LMP 2020 (Exact Date)   SpO2 100%   Breastfeeding Yes   BMI 30.56 kg/m²   Gen: NAD, AAO x3  FHT: Cat 1, irregular toco  SVE per RN: 4 cm, vertex     A/P: Angélica Loretta Hooper is a 31 y.o.  at 39w4d presenting for EIOL with favorable cervix.  Will induce with pitocin.  - GBS neg  - May have epidural    This document has been electronically signed by Lisa Ramos MD on 2021 06:37 CDT.  "

## 2021-06-17 NOTE — ANESTHESIA PREPROCEDURE EVALUATION
Anesthesia Evaluation     Patient summary reviewed and Nursing notes reviewed   NPO Solid Status: > 8 hours  NPO Liquid Status: < 2 hours           Airway   Mallampati: II  TM distance: >3 FB  Neck ROM: full  No difficulty expected  Dental - normal exam     Pulmonary - negative pulmonary ROS and normal exam   Cardiovascular - negative cardio ROS and normal exam    Rhythm: regular  Rate: normal      ROS comment: Reports of heart palpitations in past, investigated prior to admittance.   PE comment: Previous reports of heart palpitations, none present today upon preoperative assessment    Neuro/Psych- negative ROS  GI/Hepatic/Renal/Endo - negative ROS     Musculoskeletal (-) negative ROS    Abdominal  - normal exam   Substance History - negative use     OB/GYN    (+) Pregnant,         Other - negative ROS                     Anesthesia Plan    ASA 2     epidural       Anesthetic plan, all risks, benefits, and alternatives have been provided, discussed and informed consent has been obtained with: patient.

## 2021-06-17 NOTE — ANESTHESIA POSTPROCEDURE EVALUATION
Patient: Angélica Hooper    Procedure Summary     Date: 06/17/21 Room / Location:     Anesthesia Start: 1039 Anesthesia Stop: 1522    Procedure: LABOR ANALGESIA Diagnosis:     Scheduled Providers:  Provider: Merlin Valenzuela CRNA    Anesthesia Type: epidural ASA Status: 2          Anesthesia Type: epidural    Vitals  Vitals Value Taken Time   /81 06/17/21 1431   Temp 98.2 °F (36.8 °C) 06/17/21 1431   Pulse 88 06/17/21 1431   Resp 18 06/17/21 1431   SpO2 98 % 06/17/21 1431           Post Anesthesia Care and Evaluation    Patient location during evaluation: bedside  Patient participation: complete - patient participated  Level of consciousness: awake and alert  Pain score: 0  Pain management: adequate  Airway patency: patent  Anesthetic complications: No anesthetic complications  PONV Status: none  Cardiovascular status: acceptable  Respiratory status: acceptable  Hydration status: acceptable  Post Neuraxial Block status: Motor and sensory function returned to baseline and No signs or symptoms of PDPH  Comments: ---------------------------               06/17/21                      1431         ---------------------------   BP:          135/81         Pulse:         88           Resp:          18           Temp:   98.2 °F (36.8 °C)   SpO2:          98%         ---------------------------

## 2021-06-17 NOTE — PLAN OF CARE
Problem: Adult Inpatient Plan of Care  Goal: Plan of Care Review  Outcome: Ongoing, Progressing  Flowsheets (Taken 6/17/2021 3119)  Progress: improving  Plan of Care Reviewed With: patient  Outcome Summary: vss, delivered baby girl Jami. Bleeding controlled, ff.  Goal: Patient-Specific Goal (Individualized)  Outcome: Ongoing, Progressing  Goal: Absence of Hospital-Acquired Illness or Injury  Outcome: Ongoing, Progressing  Goal: Optimal Comfort and Wellbeing  Outcome: Ongoing, Progressing  Intervention: Provide Person-Centered Care  Recent Flowsheet Documentation  Taken 6/17/2021 1233 by Estela Mcbride RN  Trust Relationship/Rapport:   care explained   questions encouraged   questions answered   choices provided  Goal: Readiness for Transition of Care  Outcome: Ongoing, Progressing     Problem: Adjustment to Role Transition (Postpartum Vaginal Delivery)  Goal: Successful Maternal Role Transition  Outcome: Ongoing, Progressing     Problem: Bleeding (Postpartum Vaginal Delivery)  Goal: Hemostasis  Outcome: Ongoing, Progressing     Problem: Infection (Postpartum Vaginal Delivery)  Goal: Absence of Infection Signs and Symptoms  Outcome: Ongoing, Progressing     Problem: Pain (Postpartum Vaginal Delivery)  Goal: Acceptable Pain Control  Outcome: Ongoing, Progressing     Problem: Urinary Retention (Postpartum Vaginal Delivery)  Goal: Effective Urinary Elimination  Outcome: Ongoing, Progressing     Problem: Breastfeeding  Goal: Effective Breastfeeding  Outcome: Ongoing, Progressing   Goal Outcome Evaluation:  Plan of Care Reviewed With: patient        Progress: improving  Outcome Summary: vss, delivered baby girl Jami. Bleeding controlled, ff.

## 2021-06-17 NOTE — ANESTHESIA PROCEDURE NOTES
Labor Epidural    Pre-sedation assessment completed: 6/17/2021 9:30 AM    Patient reassessed immediately prior to procedure    Patient location during procedure: OB  Start Time: 6/17/2021 9:30 AM  Indication:at surgeon's request  Performed By  CRNA: Merlin Valenzuela CRNA  Preanesthetic Checklist  Completed: patient identified, IV checked, site marked, risks and benefits discussed, surgical consent, monitors and equipment checked, pre-op evaluation and timeout performed  Additional Notes  Epidural inserted by CONRAD Condon, assisted by Tung Valenzuela CRNA.   Prep:  Pt Position:sitting  Sterile Tech:cap, gloves, mask and sterile barrier  Prep:povidone-iodine 7.5% surgical scrub  Monitoring:blood pressure monitoring, continuous pulse oximetry and EKG  Epidural Block Procedure:  Approach:midline  Guidance:landmark technique and palpation technique  Location:L3-L4  Needle Type:Tuohy  Needle Gauge:17 G  Loss of Resistance Medium: saline  Loss of Resistance: 5cm  Cath Depth at skin:12 cm  Paresthesia: none  Aspiration:negative  Test Dose:negative  Med administered at 6/17/2021 9:57 AM  Number of Attempts: 1  Post Assessment:  Dressing:occlusive dressing applied and secured with tape  Pt Tolerance:patient tolerated the procedure well with no apparent complications  Complications:no

## 2021-06-17 NOTE — L&D DELIVERY NOTE
[facility]  Vaginal Delivery Note    Delivery     Delivery: Vaginal, Spontaneous     YOB: 2020    Time of Birth:  Gestational Age 12:27 PM   39w4d     Anesthesia: Epidural     Delivering clinician: Lisa Ramos    Forceps?   No   Vacuum? No    Shoulder dystocia present: No        Delivery narrative:     Due to scarring from prior perineal laceration and patient desire for expedited delivery, a small 1 cm median episiotomy was cut.  Patient pushed to deliver a viable 3480g female from the DAYSI position over an intact perineum via  under epidural anesthesia on 2021 at 12:27PM.  Nuchal cord x2 with body cord noted, reduced after delivery.  Left anterior shoulder was delivered with ease, followed by right posterior shoulder.  Body was then delivered with gentle traction.  Mouth and nose bulb suctioned.  3 vessel cord clamped x2 and cut after 30 seconds of delayed clamping.  Baby was placed on mother's chest.  Cord blood was obtained and sent.  Placenta delivered spontaneously intact and Pitocin was started.  Cervix, vagina, and perineum were examined and a 2nd degree laceration was noted.  3-0 Vicryl was used to repair the laceration in a routine fashion with good hemostasis.  EBL 300mL; QBL pending, please see nursing documentation.  Apgar scores 7/9.  Mother and infant stable.    Infant    Findings: female  infant     Infant observations: Weight: 3480 g (7 lb 10.8 oz)   Length: 20  in  Observations/Comments:        Apgars: 7  @ 1 minute /    9  @ 5 minutes   Infant Name: Jami     Placenta, Cord, and Fluid    Placenta delivered  Spontaneous  at   2021 12:32 PM     Cord: 3 vessels  present.   Nuchal Cord?  yes; Number of nuchal loops present:  2    Cord blood obtained: Yes    Cord gases obtained:  No    Cord gas results: Venous:  No results found for: PHCVEN    Arterial:  No results found for: PHCART     Repair    Episiotomy: Median    Yes  Suture used for repair: 3-0  Vicryl    Lacerations: Yes  Laceration Information  Laceration Repaired?   Perineal: 2nd  Yes    Periurethral:       Labial:       Sulcus:       Vaginal:       Cervical:         Suture used for repair: 3-0 Vicryl   Estimated Blood Loss: Est. Blood Loss (mL): 300 mL (Filed from Delivery Summary) (06/17/21 1050)         Complications  none    Disposition  Mother to Mother Baby/Postpartum in stable condition currently.  Baby to NBN in stable condition currently.    Lisa Ramos MD  06/17/21  13:00 CDT   No

## 2021-06-18 VITALS
RESPIRATION RATE: 18 BRPM | WEIGHT: 213 LBS | DIASTOLIC BLOOD PRESSURE: 82 MMHG | TEMPERATURE: 97.8 F | OXYGEN SATURATION: 97 % | HEIGHT: 70 IN | BODY MASS INDEX: 30.49 KG/M2 | SYSTOLIC BLOOD PRESSURE: 138 MMHG | HEART RATE: 86 BPM

## 2021-06-18 PROCEDURE — 0503F POSTPARTUM CARE VISIT: CPT | Performed by: OBSTETRICS & GYNECOLOGY

## 2021-06-18 RX ORDER — ACETAMINOPHEN 500 MG
1000 TABLET ORAL EVERY 6 HOURS PRN
Qty: 60 TABLET | Refills: 1 | Status: SHIPPED | OUTPATIENT
Start: 2021-06-18 | End: 2021-07-06

## 2021-06-18 RX ORDER — POTASSIUM CHLORIDE 750 MG/1
10 CAPSULE, EXTENDED RELEASE ORAL DAILY
Status: DISCONTINUED | OUTPATIENT
Start: 2021-06-18 | End: 2021-06-18 | Stop reason: HOSPADM

## 2021-06-18 RX ORDER — IBUPROFEN 800 MG/1
800 TABLET ORAL EVERY 8 HOURS
Qty: 60 TABLET | Refills: 1 | Status: SHIPPED | OUTPATIENT
Start: 2021-06-18 | End: 2021-07-06

## 2021-06-18 RX ADMIN — DOCUSATE SODIUM 100 MG: 100 CAPSULE, LIQUID FILLED ORAL at 08:34

## 2021-06-18 RX ADMIN — ACETAMINOPHEN 1000 MG: 500 TABLET, FILM COATED ORAL at 05:22

## 2021-06-18 RX ADMIN — IBUPROFEN 800 MG: 800 TABLET, FILM COATED ORAL at 08:34

## 2021-06-18 RX ADMIN — IBUPROFEN 800 MG: 800 TABLET, FILM COATED ORAL at 01:01

## 2021-06-18 RX ADMIN — PRENATAL VIT W/ FE FUMARATE-FA TAB 27-0.8 MG 1 TABLET: 27-0.8 TAB at 08:34

## 2021-06-18 RX ADMIN — FERROUS SULFATE TAB EC 324 MG (65 MG FE EQUIVALENT) 324 MG: 324 (65 FE) TABLET DELAYED RESPONSE at 08:35

## 2021-06-18 RX ADMIN — POTASSIUM CHLORIDE 10 MEQ: 750 CAPSULE, EXTENDED RELEASE ORAL at 08:35

## 2021-06-18 NOTE — DISCHARGE SUMMARY
[facility]  Discharge Summary  Patient Name: Angélica Hooper  : 1990  MRN: [id number]  CSN: [id number]    Discharge Summary    Date of Admission: 2021   Date of Discharge: 2021    Principle Discharge Dx: Active Hospital Problems    Diagnosis  POA   • **Encounter for elective induction of labor [Z34.90]  Not Applicable   • Single liveborn infant delivered vaginally [Z38.00]  No   • Supervision of normal pregnancy [Z34.90]  Not Applicable   • Heart palpitations [R00.2]  Yes      Procedures Performed:    Brief History: Patient is a 31 y.o. now  who presented to labor and delivery t 39w4d for EIOL.   Hospital Course: Patient presented at 39w4d for EIOL.  She had a .  Her postpartum course was unremarkable.  On PPD #1 she expressed the desire for discharge.  She had passed gas and was urinating normally.  She was eating a regular diet without difficulty.  She was ambulating well.  Discharge instructions were given.  All questions were answered   Condition:  Discharge Activity:  Discharge Diet: Stable  Activity Instructions     Bathing Restrictions      Type of Restriction: Bathing    Bathing Restrictions: Other    Explain Bathing Restrictions: No soaking in bathtub for 4 weeks. Showers are fine.    Driving Restrictions      Type of Restriction: Driving    Driving Restrictions: No Driving (Time Limited)    Length: Other    Indicate Length of Restriction: No driving for 1 week or if using narcotic pain medications. Riding is car is fine.    Lifting Restrictions      Type of Restriction: Lifting    Lifting Restrictions: Other    Explain Lifing Restrictions: No lifting more than infant and baby carrier together for 6 weeks.    Pelvic Rest      Nothing in the vagina for 6 weeks to include tampons, intercourse, or douching.    Sexual Activity Restrictions      Type of Restriction: Sex    Explain Sexual Activity Restrictions: No sexual intercourse for at least 6 weeks         Regular   Discharge Medications:    Your medication list      START taking these medications      Instructions Last Dose Given Next Dose Due   acetaminophen 500 MG tablet  Commonly known as: TYLENOL      Take 2 tablets by mouth Every 6 (Six) Hours As Needed for Mild Pain .       ibuprofen 800 MG tablet  Commonly known as: ADVIL,MOTRIN      Take 1 tablet by mouth Every 8 (Eight) Hours.          CONTINUE taking these medications      Instructions Last Dose Given Next Dose Due   magnesium oxide 400 MG tablet  Commonly known as: MAG-OX      Take 400 mg by mouth.       prenatal (CLASSIC) vitamin  tablet  Generic drug: prenatal vitamin      Take  by mouth Daily.             Where to Get Your Medications      These medications were sent to [facility] - [place] [id number]    Hours: Monday through Friday 7:00am to 5:00pm Phone: [phone] ·   acetaminophen 500 MG tablet  · ibuprofen 800 MG tablet        Discharge Disposition: Home   Follow-up: Future Appointments   Date Time Provider Department Center   7/6/2021 11:00 AM Lisa Ramos MD [facility] None      <30 minutes was spent with the patient on the day of discharge.    This document has been electronically signed by Lisa Ramos MD on June 18, 2021 11:43 CDT.

## 2021-06-18 NOTE — PROGRESS NOTES
[facility] - Vaginal Delivery Progress Note  Hospital Day: 1  Subjective:     The patient feels well. Pain is well controlled with current medications. The baby is well. The patient is ambulating well. The patient is tolerating a normal diet. Lochia lessening.   Feeding method: Breastfeed.  Birth control plan: undecided     Meds reviewed  ROS reviewed and otherwise negative     Prenatal lab:  Lab Results   Component Value Date    RUBELLAABIGG Positive 2020    ABO O 2021    RH Positive 2021       Objective:  Temp:  [97.5 °F (36.4 °C)-98.8 °F (37.1 °C)] 97.5 °F (36.4 °C)  Heart Rate:  [] 85  Resp:  [18] 18  BP: (109-202)/() 137/81    General: alert, well appearing, in no apparent distress  Lochia: appropriate  Uterine Fundus: firm    Labs:  Lab Results   Component Value Date    WBC 6.89 2021    HGB 11.6 (L) 2021    HCT 34.2 2021    MCV 88.8 2021     2021    RH Positive 2021    HEPBSAG Non-Reactive 2020   ?     Assessment:  Active Hospital Problems    Diagnosis  POA   • **Encounter for elective induction of labor [Z34.90]  Not Applicable   • Single liveborn infant delivered vaginally [Z38.00]  No   • Supervision of normal pregnancy [Z34.90]  Not Applicable   • Heart palpitations [R00.2]  Yes      Resolved Hospital Problems   No resolved problems to display.       31 y.o.  39w4d with Estimated Date of Delivery: 21 s/p  Vaginal, Spontaneous  PPD# 1. Doing well.    Plan:   Continue current care     Signature    This document has been electronically signed by Dylan Oseguera MD on 2021 06:44 CDT

## 2021-06-18 NOTE — LACTATION NOTE
This note was copied from a baby's chart.  Attempted to latch baby at this time, baby was sleepy at breast, did go over different positions with mom.

## 2021-06-21 ENCOUNTER — TELEPHONE (OUTPATIENT)
Dept: LACTATION | Facility: HOSPITAL | Age: 31
End: 2021-06-21

## 2021-06-23 ENCOUNTER — HOSPITAL ENCOUNTER (OUTPATIENT)
Dept: LACTATION | Facility: HOSPITAL | Age: 31
Discharge: HOME | End: 2021-06-23

## 2021-06-23 NOTE — LACTATION NOTE
Lactation Consult Note  Mother was just a little anxious about making sure the baby was getting plenty of milk. Mothers supply is great and infant is nursing very well. Mother left very reassured.     Evaluation Completed: 2021 14:27 CDT  Patient Name: Angélica Hooper  :  1990  MRN:  [id number]     REFERRAL  INFORMATION:                          Date of Referral: 21   Person Making Referral: patient  Maternal Reason for Referral: breastfeeding currently         MATERNAL ASSESSMENT:  Breast Size Issue: none (21)  Breast Shape: Bilateral:, wide (21)  Breast Density: full (21)  Areola: Bilateral:, elastic (21)  Nipples: everted, graspable, other (see comments) (large) (21)                MATERNAL INFANT FEEDING:     Maternal Emotional State: independent (21)  Infant Positioning: cradle (21)   Signs of Milk Transfer: audible swallow, breasts soften with feeding (21)  Pain with Feeding: no (21)           Milk Ejection Reflex: present (21)                                       Feeding Readiness Cues: sleeping (21)  Satiety Cues: decreased number of sucks, infant releases breast (21)     Effective Latch During Feeding: yes (21)  Suck/Swallow Coordination: present (21 1400)     Prefeeding Weight (gm): 3415 g (120.5 oz) (21 1400)  Postfeeding Weight (gm): 3470 g (122.4 oz) (21 1400)  Weight Gain/Loss (gm) : 55 g (1.9 oz) (21)      Latch: 2-->grasps breast, tongue down, lips flanged, rhythmic sucking (21)  Audible Swallowin-->spontaneous and intermittent (24 hrs old) (21)  Type of Nipple: 2-->everted (after stimulation) (21 1400)  Comfort (Breast/Nipple): 2-->soft/nontender (21)  Hold (Positioning): 2-->no assist from staff, mother able to position/hold infant (21 1400)  Latch Score: 10  (06/23/21 1400)      EQUIPMENT TYPE:                                 BREAST PUMPING:          LACTATION REFERRALS:

## 2021-07-06 ENCOUNTER — OFFICE VISIT (OUTPATIENT)
Dept: OBSTETRICS AND GYNECOLOGY | Facility: CLINIC | Age: 31
End: 2021-07-06

## 2021-07-06 PROCEDURE — 0503F POSTPARTUM CARE VISIT: CPT | Performed by: OBSTETRICS & GYNECOLOGY

## 2021-07-06 NOTE — PROGRESS NOTES
Postpartum visit     Angélica Hooper is a 31 y.o.  s/p Vaginal, Spontaneous on 2021 at 39w4d secondary to EIOL who presents today for postpartum check.  The patient states she is doing well.  Patient denies postpartum depression.  Menstrual cycles have not resumed.  Breastfeeding, but weaning.  Desires OCPs (MONONESSA, 28, 0.25-35 mg-mcg) for contraception.  She has not resumed sexual intercourse.  Denies bowel or bladder issues.    PHYSICAL EXAM:    LMP 2020 (Exact Date)   Breastfeeding No   Postpartum Depression Screening Questionnaire: 1, no treatment indicated.    IMPRESSION/PLAN: Angélica Hooper is a 31 y.o.  s/p Vaginal, Spontaneous on .  Doing well.   - Recovered nicely from her delivery  - Contraception: OCPs  - RTC 4 weeks for final PP visit    This document has been electronically signed by Lisa Ramos MD on 2021 10:51 CDT.    This visit has been rescheduled as a phone visit to comply with patient safety concerns in accordance with CDC recommendations.  Total time of discussion was 8 minutes.  The use of a telephone visit has been reviewed with the patient and verbal informed consent has been obtained.

## 2021-07-14 ENCOUNTER — PATIENT MESSAGE (OUTPATIENT)
Dept: OBSTETRICS AND GYNECOLOGY | Facility: CLINIC | Age: 31
End: 2021-07-14

## 2021-07-14 DIAGNOSIS — Z30.011 ENCOUNTER FOR INITIAL PRESCRIPTION OF CONTRACEPTIVE PILLS: Primary | ICD-10-CM

## 2021-07-14 RX ORDER — NORGESTIMATE AND ETHINYL ESTRADIOL 0.25-0.035
1 KIT ORAL DAILY
Qty: 28 TABLET | Refills: 12 | Status: SHIPPED | OUTPATIENT
Start: 2021-07-14 | End: 2022-06-28 | Stop reason: ALTCHOICE

## 2021-08-02 ENCOUNTER — POSTPARTUM VISIT (OUTPATIENT)
Dept: OBSTETRICS AND GYNECOLOGY | Facility: CLINIC | Age: 31
End: 2021-08-02

## 2021-08-02 VITALS
BODY MASS INDEX: 26.86 KG/M2 | HEIGHT: 70 IN | WEIGHT: 187.6 LBS | DIASTOLIC BLOOD PRESSURE: 76 MMHG | SYSTOLIC BLOOD PRESSURE: 130 MMHG

## 2021-08-02 DIAGNOSIS — Z12.4 ENCOUNTER FOR PAPANICOLAOU SMEAR FOR CERVICAL CANCER SCREENING: ICD-10-CM

## 2021-08-02 PROBLEM — Z34.90 ENCOUNTER FOR ELECTIVE INDUCTION OF LABOR: Status: RESOLVED | Noted: 2021-06-17 | Resolved: 2021-08-02

## 2021-08-02 PROBLEM — R00.2 HEART PALPITATIONS: Status: RESOLVED | Noted: 2020-11-20 | Resolved: 2021-08-02

## 2021-08-02 PROBLEM — Z34.90 SUPERVISION OF NORMAL PREGNANCY: Status: RESOLVED | Noted: 2020-11-20 | Resolved: 2021-08-02

## 2021-08-02 PROCEDURE — 0503F POSTPARTUM CARE VISIT: CPT | Performed by: OBSTETRICS & GYNECOLOGY

## 2021-08-02 NOTE — PROGRESS NOTES
"Postpartum visit     Angélica Hooper is a 31 y.o.  s/p Vaginal, Spontaneous on 2021 at 39w4d secondary to EIOL who presents today for postpartum check.  The patient states she is doing well.  Patient denies postpartum depression.  Menstrual cycles have not resumed.  Breastfeeding, but weaning.  Desires OCPs (MONONESSA, 28, 0.25-35 mg-mcg) for contraception.  She has not resumed sexual intercourse.  Denies bowel or bladder issues.    PHYSICAL EXAM:    /76   Ht 177.8 cm (70\")   Wt 85.1 kg (187 lb 9.6 oz)   LMP 2020 (Exact Date)   BMI 26.92 kg/m²   Gen: NAD, AAO x3  External Genitalia/Vulva: Anatomy is normal, no significant redness of labia, no discharge on vulvar tissues, [place]'s and Bartholin's glands are normal, no ulcers, no condylomatous lesions.  Urethral meatus: Normal, no lesions, no prolapse.  Urethra: Normal, no masses, no tenderness with palpation.  Bladder: Normal, no fullness, no masses, no tenderness with palpation.  Vagina: Vaginal tissues are not inflamed, normal color and texture, no significant discharge present.  Pelvic support adequate.  Cervix: Normal, no lesions, no purulent discharge, no cervical motion tenderness.  Pap smear obtained.  Uterus: Normal size, shape, and consistency.  Good mobility noted.  Minimal descent noted with good support.  Adnexa: Normal size and shape bilaterally, no palpable mass bilaterally and non-tender bilaterally.  Rectal: Normal, no masses or polyps, confirms bimanual exam, perianal normal, no lesions; SAM deferred.  Postpartum Depression Screening Questionnaire: 0, no treatment indicated.    IMPRESSION/PLAN: Angélica Hooper is a 31 y.o.  s/p Vaginal, Spontaneous on .  Doing well.   - Recovered nicely from her delivery  - Contraception: OCPs  - Pap smear obtained today    This document has been electronically signed by Lisa Ramos MD on 2021 15:10 CDT.  "

## 2021-08-09 LAB
LAB AP CASE REPORT: NORMAL
PATH INTERP SPEC-IMP: NORMAL

## 2021-09-27 DIAGNOSIS — M53.3 TAIL BONE PAIN: Primary | ICD-10-CM

## 2021-10-25 ENCOUNTER — HOSPITAL ENCOUNTER (OUTPATIENT)
Dept: PHYSICAL THERAPY | Facility: HOSPITAL | Age: 31
Setting detail: THERAPIES SERIES
Discharge: HOME | End: 2021-10-25

## 2021-10-25 DIAGNOSIS — M53.3 COCCYX PAIN: Primary | ICD-10-CM

## 2021-10-25 PROCEDURE — 97162 PT EVAL MOD COMPLEX 30 MIN: CPT | Performed by: PHYSICAL THERAPIST

## 2021-10-25 PROCEDURE — 97035 APP MDLTY 1+ULTRASOUND EA 15: CPT | Performed by: PHYSICAL THERAPIST

## 2021-10-25 PROCEDURE — 97140 MANUAL THERAPY 1/> REGIONS: CPT | Performed by: PHYSICAL THERAPIST

## 2021-10-25 NOTE — THERAPY EVALUATION
Outpatient Physical Therapy Pelvic Health Initial Evaluation  [facility]     Patient Name: Angélica Hooper  : 1990  MRN: [id number]  Today's Date: 10/25/2021        Visit Date: 10/25/2021  Visit number:   Recheck: 21  Insurance: [employer]      Patient Active Problem List   Diagnosis   (none) - all problems resolved or deleted        Past Medical History:   Diagnosis Date   • Depression    • Palpitations         Past Surgical History:   Procedure Laterality Date   • ADENOIDECTOMY     • TONSILLECTOMY     • WISDOM TOOTH EXTRACTION       Allergies   Allergen Reactions   • Penicillins Rash     Rash     Current Outpatient Medications on File Prior to Encounter   Medication Sig Dispense Refill   • magnesium oxide (MAG-OX) 400 MG tablet Take 400 mg by mouth.     • norgestimate-ethinyl estradiol (ORTHO-CYCLEN) 0.25-35 MG-MCG per tablet Take 1 tablet by mouth Daily. 28 tablet 12   • Prenatal Vit-Fe Fumarate-FA (PRENATAL, CLASSIC, VITAMIN) 28-0.8 MG tablet tablet Take  by mouth Daily.       No current facility-administered medications on file prior to encounter.         Visit Dx:    ICD-10-CM ICD-9-CM   1. Coccyx pain  M53.3 724.79            Pelvic Health     Row Name 10/25/21 1600             Subjective Comments    Subjective Comments Tailbone pain started in pregnancy, but worsened with delivery of the child.  Delivery on  fo female infant.  Induced at 5am and delivered at 12pm.  Cut scar tissue a little to assist with delivery.  Pain is worse with prolonged seated position.  Sit to stand creates increased pain.  Bowel activity is good without pain. Return to intimacy without pain. Tailbone pain seems to be centered.  Glut muscle feels tightened on R side butt cheek. Sitting on cushion seat with constant moving but tolerance to 45 minutes.  -SW              Pain Assessment    Pain Assessment 0-10  -SW      Pain Score 5  -SW      Post Pain Score 5  -SW            User Key  (r) =  Recorded By, (t) = Taken By, (c) = Cosigned By    Initials Name Provider Type    SW Chris Patrandal Worthington, PT DPT Physical Therapist               PT Ortho     Row Name 10/25/21 1600       Subjective Pain    Able to rate subjective pain? yes  -SW    Pre-Treatment Pain Level 5  -SW    Post-Treatment Pain Level 5  -SW       Posture/Observations    Posture- WNL Posture is WNL  -SW    Posture/Observations Comments standing symmetry at pelvic girdle. supine positon ant innom on L side.  Alert and oriented.  No discomfort observed with seated posture.  Post pelvic tilt with seated position.  -SW       Quarter Clearing    Quarter Clearing Lower Quarter Clearing  -SW       DTR- Lower Quarter Clearing    Patellar tendon (L2-4) 2- Normal response  -SW    Achilles tendon (S1-2) 2- Normal response  -SW       Neural Tension Signs- Lower Quarter Clearing    Slump Negative  -SW    SLR --  hamstring restriction vs nerve tension  -SW       Sensory Screen for Light Touch- Lower Quarter Clearing    L1 (inguinal area) Intact  -SW    L2 (anterior mid thigh) Intact  -SW    L3 (distal anterior thigh) Intact  -SW    L4 (medial lower leg/foot) Intact  -SW    L5 (lateral lower leg/great toe) Intact  -SW    S1 (bottom of foot) Intact  -SW       Lumbar ROM Screen- Lower Quarter Clearing    Lumbar Flexion Normal  -SW    Lumbar Extension Normal  -SW    Lumbar Lateral Flexion Normal  -SW    Lumbar Rotation Normal  -SW       SI/Hip Screen- Lower Quarter Clearing    ASIS compression Positive  -SW    ASIS distraction Negative  -SW    Mary's/Oswaldo's test Positive  -SW    Posterior thigh sheer Negative  -SW    Pain in Denisha's area Positive; Right:  -SW       Special Tests/Palpation    Special Tests/Palpation Lumbar/SI  -SW       Lumbosacral Accessory Motions    Lumbosacral Accessory Motions Tested? Yes  -SW    PA glide- Sacral base --  tender on RSI base and AP glides reduced.  R on L sacral torsion  -SW    Innominate rotation --  ant L innom in  supine  -SW       Lumbosacral Palpation    Lumbosacral Palpation? Yes  -SW    SI --  R on L sacral torsion  -SW    Lumbosacral Segment --  L4/5 rotated to L  -SW    Piriformis Guarded/taut; Tender  -SW    Quadratus Lumborum Guarded/taut; Tender  -SW    Erector Spinae (Paraspinals) Guarded/taut; Tender  -SW    Hamstring --  reduced length of 45 deg bilaterally  -SW    Lumbosacral Palpation Comments restricted muscle length to hamstrings, QL and errector spinae.  R on L sacral torsion with L lumbar rotation at L4/5.  -SW       Flexibility    Flexibility Tested? --  reduced throughout  -SW       Balance Skills Training    Balance Comments unremarkable  -SW       Transfers    Comment (Transfers) independent  -SW       Gait/Stairs (Locomotion)    Comment (Gait/Stairs) independent  -          User Key  (r) = Recorded By, (t) = Taken By, (c) = Cosigned By    Initials Name Provider Type    Pat Michel, PT DPT Physical Therapist                            PT Assessment/Plan     Row Name 10/25/21 1700          PT Assessment    Assessment Comments Patient is a 32 yo female presenting to clinic with c/o back pain due to lumbosacral dysfunction.  She presents with L ant innom and R on L sacral torsion with inc muscle tension and pain. She would benefit from skiled therapy services to improve function without pain.  -SW     Rehab Potential Good  -SW     Patient/caregiver participated in establishment of treatment plan and goals Yes  -SW     Patient would benefit from skilled therapy intervention Yes  -SW            PT Plan    PT Frequency 1x/week  -SW     PT Plan Comments combo, manual therapy, stretching, stabilization and postural control exercises.  -           User Key  (r) = Recorded By, (t) = Taken By, (c) = Cosigned By    Initials Name Provider Type    Pat Michel, PT DPT Physical Therapist                 Modalities     Row Name 10/25/21 1600             Ultrasound 55182    Location B SI joint  and LS paraspinals  -SW      Combo RX Minutes [id number]  -SW      Duty Cycle 100  -SW      Frequency 1.0 MHz  -SW      Intensity - Wts/cm 1.5  -SW            User Key  (r) = Recorded By, (t) = Taken By, (c) = Cosigned By    Initials Name Provider Type    Pat Michel, PT DPT Physical Therapist               OP Exercises     Row Name 10/25/21 1600             Subjective Comments    Subjective Comments Tailbone pain started in pregnancy, but worsened with delivery of the child.  Delivery on June 17th fo female infant.  Induced at 5am and delivered at 12pm.  Cut scar tissue a little to assist with delivery.  Pain is worse with prolonged seated position.  Sit to stand creates increased pain.  Bowel activity is good without pain. Return to intimacy without pain. Tailbone pain seems to be centered.  Glut muscle feels tightened on R side butt cheek. Sitting on cushion seat with constant moving but tolerance to 45 minutes.  -SW              Subjective Pain    Able to rate subjective pain? yes  -SW      Pre-Treatment Pain Level 5  -SW      Post-Treatment Pain Level 5  -SW              Exercise 1    Exercise Name 1 hamstring stretch  -SW      Reps 1 3  -SW      Time 1 30 sec  -SW              Exercise 2    Exercise Name 2 nerve glides standing with trunk rotation  -SW      Reps 2 5  -SW              Exercise 3    Exercise Name 3 piriformis stretch standing  -SW      Reps 3 3  -SW      Time 3 30 sec  -SW              Exercise 4    Exercise Name 4 angry cat stretch  -SW      Reps 4 10  -SW      Time 4 5 sc  -SW            User Key  (r) = Recorded By, (t) = Taken By, (c) = Cosigned By    Initials Name Provider Type    Pat Michel, WILFREDO DPT Physical Therapist              Manual Rx (last 36 hours)     Manual Treatments     Row Name 10/25/21 1700             Manual Rx 1    Manual Rx 1 Location MET for alignment  -SW      Manual Rx 1 Type R on L sacral torsion, ant innom on L  -SW              Manual Rx 2    Manual  Rx 2 Location lumbosacral region  -      Manual Rx 2 Type MFR/ STM/cupping  -            User Key  (r) = Recorded By, (t) = Taken By, (c) = Cosigned By    Initials Name Provider Type    Pat Michel, PT DPT Physical Therapist                           PT OP Goals     Row Name 10/25/21 1700          PT Short Term Goals    STG Date to Achieve 11/25/21  -     STG 1 patient to be independent and compliant with HEP to assist with stretching and mobility  -     STG 1 Progress New  -     STG 2 patient to recognize seated neutral position and maintain with ther intevention x 20 minutes without cues required.  -     STG 2 Progress New  -     STG 3 Pt to show improved alignment to pelvic girdle without need for MET required.  -     STG 3 Progress New  -     STG 4 patient to report pain to 3/10 most days without inc for prolonged seated position.  -     STG 4 Progress New  -     STG 5 patient to present with improved position to lumbosacral region such that lumbar paraspinals are improved length and dec tension with improved fascial glide that doesn't restrict general mobility.  -     STG 5 Progress New  [facility]            Long Term Goals    LTG Date to Achieve 01/27/22  -     LTG 1 patient to report improvement of 70% better overall since induction of PT.  -     LTG 1 Progress New  -     LTG 2 patient to improve seated tolerance of 1.5 hours as needed without inc pain reported.  -     LTG 2 Progress New  -     LTG 3 Meet personal goals of caretaking of kids without modifications required.  -     LTG 3 Progress New  [facility]            Time Calculation    PT Goal Re-Cert Due Date 11/25/21  -           User Key  (r) = Recorded By, (t) = Taken By, (c) = Cosigned By    Initials Name Provider Type    Pat Michel, PT DPT Physical Therapist                Therapy Education  Given: HEP  Program: New  How Provided: Verbal, Demonstration  Provided to: Patient  Level of Understanding:  Teach back education performed, Verbalized, Demonstrated               Time Calculation:   Start Time: 1600  Stop Time: 1706  Time Calculation (min): 66 min  Therapy Charges for Today     Code Description Service Date Service Provider Modifiers Qty    12786627310 HC PT EVAL MOD COMPLEXITY 3 10/25/2021 Pat Perez, PT DPT GP 1    65067525136 HC PT MANUAL THERAPY EA 15 MIN 10/25/2021 Pat Perez, PT DPT GP 1    06192879521 HC PT ULTRASOUND EA 15 MIN 10/25/2021 Pat Perez, PT DPT GP 1                  Pat Perez, PT DPT  10/25/2021

## 2021-11-04 ENCOUNTER — HOSPITAL ENCOUNTER (OUTPATIENT)
Dept: PHYSICAL THERAPY | Facility: HOSPITAL | Age: 31
Setting detail: THERAPIES SERIES
Discharge: HOME | End: 2021-11-04

## 2021-11-04 DIAGNOSIS — M53.3 COCCYX PAIN: Primary | ICD-10-CM

## 2021-11-04 PROCEDURE — 97110 THERAPEUTIC EXERCISES: CPT | Performed by: PHYSICAL THERAPIST

## 2021-11-04 PROCEDURE — 97035 APP MDLTY 1+ULTRASOUND EA 15: CPT | Performed by: PHYSICAL THERAPIST

## 2021-11-04 PROCEDURE — 97140 MANUAL THERAPY 1/> REGIONS: CPT | Performed by: PHYSICAL THERAPIST

## 2021-11-04 NOTE — THERAPY TREATMENT NOTE
Outpatient Physical Therapy Pelvic Health Treatment Note  [facility]     Patient Name: Angélica Hooper  : 1990  MRN: [id number]  Today's Date: 2021        Visit Date: 2021   Visit number:   Recheck: 21  Insurance: [employer]      Visit Dx:    ICD-10-CM ICD-9-CM   1. Coccyx pain  M53.3 724.79       Patient Active Problem List   Diagnosis   (none) - all problems resolved or deleted           PT Ortho     Row Name 21 1500       Subjective Comments    Subjective Comments Started chiropractor more regularly and feels that it may have helped a bit as well.  I told her what you said about my sacrum being sidebent and she agreed.  Sacrum has been better.  Riding in car causes inc pain.  Still can't make it through [email or web address] service without pain. I have been stretching as you requested.  -SW       Subjective Pain    Able to rate subjective pain? yes  -SW    Pre-Treatment Pain Level 3  -SW    Post-Treatment Pain Level 0  -SW       Posture/Observations    Posture/Observations Comments normal gait sequence noted.  Sits in post pelvic tilt. Good spirits this date.  -SW       Lumbosacral Accessory Motions    PA glide- Sacral base --  remains with L on R sacral torsion; stiff with AP glide  -SW       Lumbosacral Palpation    SI --  L on R sacral torsion  -SW    Erector Spinae (Paraspinals) Tender; Guarded/taut  -SW    Lumbosacral Palpation Comments Inc tension across LB and sacral region. Reduced mobility through LS and sacral spine.  -SW          User Key  (r) = Recorded By, (t) = Taken By, (c) = Cosigned By    Initials Name Provider Type    SW Pat Perez, PT DPT Physical Therapist                            PT Assessment/Plan     Row Name 21 1500          PT Assessment    Assessment Comments Patient's complaints are doing better overall.  She has started chiro appointments to compliment PT services.  Overall feels less painful and more mobile.  She is able to recognize  neutral spine mechanics which are necessary to maintain spinal alignment.  Responds well to combo and cupping treatment.  Based on clinical examination, suspect some degenerative changes to LS and sacral spine as to origin of pain.  -SW     Rehab Potential Good  -SW     Patient/caregiver participated in establishment of treatment plan and goals Yes  -SW     Patient would benefit from skilled therapy intervention Yes  -SW            PT Plan    PT Frequency 1x/week  -SW     PT Plan Comments Pelvic stretch quadruped IR/ER, mid back stretch and seated stab on ball with rows/clocks.  -SW           User Key  (r) = Recorded By, (t) = Taken By, (c) = Cosigned By    Initials Name Provider Type    Pat Michel, PT DPT Physical Therapist                 Modalities     Row Name 11/04/21 1500             Ultrasound [id number]    Location B SI joint and LS paraspinals  -SW      Combo RX Minutes [id number] 8  -SW      Duty Cycle 100  -SW      Frequency 1.0 MHz  -SW      Intensity - Wts/cm 1.5  -SW            User Key  (r) = Recorded By, (t) = Taken By, (c) = Cosigned By    Initials Name Provider Type    Pat Michel, PT DPT Physical Therapist               OP Exercises     Row Name 11/04/21 1500             Subjective Comments    Subjective Comments Started chiropractor more regularly and feels that it may have helped a bit as well.  I told her what you said about my sacrum being sidebent and she agreed.  Sacrum has been better.  Riding in car causes inc pain.  Still can't make it through [employer] service without pain. I have been stretching as you requested.  -SW              Subjective Pain    Able to rate subjective pain? yes  -SW      Pre-Treatment Pain Level 3  -SW      Post-Treatment Pain Level 0  -SW              Exercise 1    Exercise Name 1 hamstring stretch  -SW      Reps 1 3  -SW      Time 1 30 sec  -SW              Exercise 2    Exercise Name 2 adductor stretch  -SW      Reps 2 3  -SW      Time 2 30 sec  -SW    "           Exercise 3    Exercise Name 3 physioball AP glides  -SW      Time 3 2'min  -SW              Exercise 4    Exercise Name 4 physioball lateral glides  -SW      Time 4 2'min  -SW              Exercise 5    Exercise Name 5 physioball circles  -SW      Time 5 2'min  -SW              Exercise 6    Exercise Name 6 piriformis stretch  -SW      Reps 6 3  -SW      Time 6 30 sec  -SW      Additional Comments introduced supine position  -SW              Exercise 7    Exercise Name 7 neutral spine posture education  -SW      Additional Comments instructed pt on neutral spine posture in seated.  Able to find neutral and understand need for that position.  Used \"bowl spill\" conversation for education while demonstrating to patient.  Good understanding and teach back.  -SW              Exercise 8    Exercise Name 8 car position for seated mechanics  -SW      Additional Comments avoid full extension of LE.  Rest on heel to allow PF/DF as needed.  Spine in neutral spine supported with lumbar roll or towel as needed.  -SW            User Key  (r) = Recorded By, (t) = Taken By, (c) = Cosigned By    Initials Name Provider Type     Pat Perez, PT DPT Physical Therapist              Manual Rx (last 36 hours)     Manual Treatments     Row Name 11/04/21 1700             Manual Rx 1    Manual Rx 1 Location Lumbosacral region  -SW      Manual Rx 1 Type MFR and STM and cupping  -SW              Manual Rx 2    Manual Rx 2 Location sacral base  -SW      Manual Rx 2 Type AP glides Grade II-III  -SW            User Key  (r) = Recorded By, (t) = Taken By, (c) = Cosigned By    Initials Name Provider Type     Pat Perez, PT DPT Physical Therapist                           PT OP Goals     Row Name 11/04/21 1500          PT Short Term Goals    STG Date to Achieve 11/25/21  -SW     STG 1 patient to be independent and compliant with HEP to assist with stretching and mobility  -SW     STG 1 Progress Met  -SW     STG 2 " patient to recognize seated neutral position and maintain with ther intevention x 20 minutes without cues required.  -     STG 2 Progress Progressing  -     STG 3 Pt to show improved alignment to pelvic girdle without need for MET required.  -     STG 3 Progress Progressing  -     STG 4 patient to report pain to 3/10 most days without inc for prolonged seated position.  -[facility] 4 Progress New  -[facility] 5 patient to present with improved position to lumbosacral region such that lumbar paraspinals are improved length and dec tension with improved fascial glide that doesn't restrict general mobility.  -[facility] 5 Progress New  [facility]            Long Term Goals    LTG Date to Achieve 01/27/22  -     LTG 1 patient to report improvement of 70% better overall since induction of PT.  -     LTG 1 Progress New  -     LTG 2 patient to improve seated tolerance of 1.5 hours as needed without inc pain reported.  -     LTG 2 Progress New  [facility]     LTG 3 Meet personal goals of caretaking of kids without modifications required.  -     LTG 3 Progress New  [facility]            Time Calculation    PT Goal Re-Cert Due Date 11/25/21  -           User Key  (r) = Recorded By, (t) = Taken By, (c) = Cosigned By    Initials Name Provider Type     Pat Perez, PT DPT Physical Therapist                 Therapy Education  Given: HEP, Posture/body mechanics  Program: Reinforced  How Provided: Verbal, Demonstration  Provided to: Patient  Level of Understanding: Teach back education performed, Verbalized, Demonstrated              Time Calculation:   Start Time: 1601  Stop Time: 1710  Time Calculation (min): 69 min  Therapy Charges for Today     Code Description Service Date Service Provider Modifiers Qty    06311854321 HC PT ULTRASOUND EA 15 MIN 11/4/2021 Pat Perez, PT DPT GP 1    72174487377 HC PT THER PROC EA 15 MIN 11/4/2021 Pat Perez, PT DPT GP 2    34362869350 HC PT MANUAL THERAPY EA 15 MIN  11/4/2021 Pat Perez, PT DPT GP 2                    Pat Perez, PT DPT  11/4/2021

## 2021-11-11 ENCOUNTER — HOSPITAL ENCOUNTER (OUTPATIENT)
Dept: PHYSICAL THERAPY | Facility: HOSPITAL | Age: 31
Setting detail: THERAPIES SERIES
Discharge: HOME | End: 2021-11-11

## 2021-11-11 DIAGNOSIS — M53.3 COCCYX PAIN: Primary | ICD-10-CM

## 2021-11-11 PROCEDURE — 97140 MANUAL THERAPY 1/> REGIONS: CPT | Performed by: PHYSICAL THERAPIST

## 2021-11-11 PROCEDURE — 97035 APP MDLTY 1+ULTRASOUND EA 15: CPT | Performed by: PHYSICAL THERAPIST

## 2021-11-11 NOTE — THERAPY TREATMENT NOTE
Outpatient Physical Therapy Pelvic Health Treatment Note  [facility]     Patient Name: Angélica Hooper  : 1990  MRN: [id number]  Today's Date: 2021        Visit Date: 2021   Visit number: 3/3  Recheck: 21  Insurance: [employer]      Visit Dx:    ICD-10-CM ICD-9-CM   1. Coccyx pain  M53.3 724.79       Patient Active Problem List   Diagnosis   (none) - all problems resolved or deleted           PT Ortho     Row Name 21 1600       Subjective Comments    Subjective Comments Has been up early this morning.  Child was sick and throwing up.  Tailbone is ok.  Prolonged seated pain is the worse.  Chriopractor on Friday and headed there tomorrow.  -       Subjective Pain    Able to rate subjective pain? yes  -    Pre-Treatment Pain Level 3  -    Post-Treatment Pain Level 0  -       Posture/Observations    Posture/Observations Comments lethargic appearance.  Presents tired having been up all night.  Gait and t/fs are without dificulty.  -SW       Lumbosacral Palpation    Quadratus Lumborum Guarded/taut; Bilateral:; Trigger point  active trigger points throughout with L worse and most active deep at ilium attachment.  -SW    Lumbosacral Palpation Comments LB tension to QL noted bilaterally.  Active trigger points noted along ilium.  Soleus with positive triggers points but most latent and non-referral to SI joint.  R soleus > L.  -          User Key  (r) = Recorded By, (t) = Taken By, (c) = Cosigned By    Initials Name Provider Type    Pat Michel, PT DPT Physical Therapist                            PT Assessment/Plan     Row Name 21 1600          PT Assessment    Assessment Comments Arranged treatment this date to focus on myofascial tension around SI joint. Lots of fascial tension noted around QL bilateral sides with active trigger points.  Improved mobility post treatment.  Has had a past injury to R ankle as well with + triggers in soleus muscle.   This could be some contribution to her pain at SI joint.  Continued combo post treatment to assist with soreness due to inc manual work.  Will monitor progress and response to treatment.  -SW     Rehab Potential Good  -SW     Patient/caregiver participated in establishment of treatment plan and goals Yes  -SW     Patient would benefit from skilled therapy intervention Yes  -SW            PT Plan    PT Frequency 1x/week  -SW     PT Plan Comments intiiate stretches from prior date.  Revisit manual work as necessary and beneficial.  -SW           User Key  (r) = Recorded By, (t) = Taken By, (c) = Cosigned By    Initials Name Provider Type    Pat Michel, PT DPT Physical Therapist                 Modalities     Row Name 11/11/21 1600             Ultrasound 35376    Location B SI joint and LS paraspinals  -SW      Combo RX Minutes 13920 8  -SW      Duty Cycle 100  -SW      Frequency 1.0 MHz  -SW      Intensity - Wts/cm 1.5  -SW      01121 - PT Ultrasound Minutes 8  -SW            User Key  (r) = Recorded By, (t) = Taken By, (c) = Cosigned By    Initials Name Provider Type    Pat Michel, PT DPT Physical Therapist               OP Exercises     Row Name 11/11/21 1600             Subjective Comments    Subjective Comments Has been up early this morning.  Child was sick and throwing up.  Tailbone is ok.  Prolonged seated pain is the worse.  Chriopractor on Friday and headed there tomorrow.  -SW              Subjective Pain    Able to rate subjective pain? yes  -SW      Pre-Treatment Pain Level 3  -SW      Post-Treatment Pain Level 0  -SW              Exercise 1    Exercise Name 1 prone ankle DF/PF with soleus work  -SW      Sets 1 2  -SW      Reps 1 10  -SW              Exercise 2    Exercise Name 2 opp arm/leg raise  -SW      Sets 2 2  -SW      Reps 2 10  -SW              Exercise 3    Exercise Name 3 angry cat stretch  -SW      Sets 3 2  -SW      Reps 3 10  -SW              Exercise 4    Exercise  Name 4 QL stretch SL  -SW      Reps 4 3  -SW      Time 4 30 sec  -SW            User Key  (r) = Recorded By, (t) = Taken By, (c) = Cosigned By    Initials Name Provider Type    Pat Michel, PT DPT Physical Therapist              Manual Rx (last 36 hours)     Manual Treatments     Row Name 11/11/21 1700             Manual Rx 1    Manual Rx 1 Location QL active trigger release/deactivation/reinforcement  -      Manual Rx 1 Duration performed bilaterally for 15 minutes each for 30 min total  -              Manual Rx 2    Manual Rx 2 Location soleus trigger release/deactivation and reinforcement  -      Manual Rx 2 Duration R side only 15'min  -SW            User Key  (r) = Recorded By, (t) = Taken By, (c) = Cosigned By    Initials Name Provider Type    Pat Michel, PT DPT Physical Therapist                           PT OP Goals     Row Name 11/11/21 1700          PT Short Term Goals    STG Date to Achieve 11/25/21  -     STG 1 patient to be independent and compliant with HEP to assist with stretching and mobility  -     STG 1 Progress Met  -     STG 2 patient to recognize seated neutral position and maintain with ther intevention x 20 minutes without cues required.  -     STG 2 Progress Progressing  -     STG 2 Progress Comments patient more aware with positioning.  Modified seat in car and school.  Improved awareness.  -     STG 3 Pt to show improved alignment to pelvic girdle without need for MET required.  -     STG 3 Progress Progressing  -     STG 4 patient to report pain to 3/10 most days without inc for prolonged seated position.  -     STG 4 Progress Progressing  -     STG 4 Progress Comments currently 3/10 at initiation of treatment.  -     STG 5 patient to present with improved position to lumbosacral region such that lumbar paraspinals are improved length and dec tension with improved fascial glide that doesn't restrict general mobility.  -     STG 5  Progress Progressing  -            Long Term Goals    LTG Date to Achieve 01/27/22  -     LTG 1 patient to report improvement of 70% better overall since induction of PT.  -     LTG 1 Progress New  -     LTG 2 patient to improve seated tolerance of 1.5 hours as needed without inc pain reported.  -     LTG 2 Progress New  -     LTG 3 Meet personal goals of caretaking of kids without modifications required.  -     LTG 3 Progress New  -            Time Calculation    PT Goal Re-Cert Due Date 11/25/21  -           User Key  (r) = Recorded By, (t) = Taken By, (c) = Cosigned By    Initials Name Provider Type    Pat Michel, PT DPT Physical Therapist                 Therapy Education  Given: HEP  Program: Reinforced, Progressed  How Provided: Verbal, Demonstration  Provided to: Patient  Level of Understanding: Teach back education performed, Verbalized, Demonstrated              Time Calculation:   Start Time: 1558  Stop Time: 1705  Time Calculation (min): 67 min  Timed Charges  57747 - PT Ultrasound Minutes: 8  Total Minutes  Timed Charges Total Minutes: 8   Total Minutes: 8  Therapy Charges for Today     Code Description Service Date Service Provider Modifiers Qty    69524406092 HC PT ULTRASOUND EA 15 MIN 11/11/2021 Pat Perez, PT DPT GP 1    09822210302 HC PT MANUAL THERAPY EA 15 MIN 11/11/2021 Pat Perez, PT DPT GP 3    1991154 HC PT THER SUPP EA 15 MIN 11/11/2021 Pat Perez, PT DPT GP 1                    Pat Perez PT DPT  11/11/2021

## 2021-11-18 ENCOUNTER — HOSPITAL ENCOUNTER (OUTPATIENT)
Dept: PHYSICAL THERAPY | Facility: HOSPITAL | Age: 31
Setting detail: THERAPIES SERIES
Discharge: HOME | End: 2021-11-18

## 2021-11-18 DIAGNOSIS — M53.3 COCCYX PAIN: Primary | ICD-10-CM

## 2021-11-18 PROCEDURE — 97140 MANUAL THERAPY 1/> REGIONS: CPT | Performed by: PHYSICAL THERAPIST

## 2021-11-19 NOTE — THERAPY TREATMENT NOTE
Outpatient Physical Therapy Pelvic Health Treatment Note  [facility]     Patient Name: Angélica Hooper  : 1990  MRN: [id number]  Today's Date: 2021        Visit Date: 2021   Visit number:   Recheck: 21  Insurance: [employer]      Visit Dx:    ICD-10-CM ICD-9-CM   1. Coccyx pain  M53.3 724.79       Patient Active Problem List   Diagnosis   (none) - all problems resolved or deleted           PT Ortho     Row Name 21 1600       Subjective Comments    Subjective Comments Back felt better after last treatment.  Was sore but hasn't been quite as bad sitting through [employer].  Pain is not as intense.  Takes longer before it starts.  Still in the center part of back.  Went Friday to chiro and goes again tomorrow. I think I am getting some better.  -SW       Subjective Pain    Able to rate subjective pain? yes  -SW    Pre-Treatment Pain Level 2  -SW    Post-Treatment Pain Level 0  -SW       Posture/Observations    Posture/Observations Comments alert and oriented.  Good spirits.  Happy demeanor.  -SW       SI/Hip Screen- Lower Quarter Clearing    ASIS compression Negative  -SW    ASIS distraction Negative  -SW    Mary's/Oswaldo's test Negative  -SW    Posterior thigh sheer Negative  -SW    Pain in Denisha's area Bilateral:; Positive  -SW       Lumbosacral Accessory Motions    Lumbosacral Accessory Motions Tested? Yes  aligned with good symmetry  -SW       Lumbosacral Palpation    Quadratus Lumborum Guarded/taut; Bilateral:; Trigger point  active trigger points bilaterally; most active deep at ilium attachment on both sides.  -SW    Lumbosacral Palpation Comments Improved fascial glide to segmental attachments of QL.  Remains tender along ilium attachment bilaterally as active trigger location.  Improved with treatment.  -SW       General ROM    GENERAL ROM COMMENTS no functional restrictions noted.  -SW       Transfers    Comment (Transfers) independent without guarding  -SW           User Key  (r) = Recorded By, (t) = Taken By, (c) = Cosigned By    Initials Name Provider Type    Pat Michel, PT DPT Physical Therapist                            PT Assessment/Plan     Row Name 11/18/21 1600          PT Assessment    Assessment Comments Patient is responding well to myofascial trigger point techniques with reported less pain and improved function.  She presents with less pain and more symmetrical alignment overall.  Improved seated tolerance for prolonged periods.  Overall good response thus far. Good progression with goal attainment.  -SW     Rehab Potential Good  -     Patient/caregiver participated in establishment of treatment plan and goals Yes  -SW     Patient would benefit from skilled therapy intervention Yes  -SW            PT Plan    PT Plan Comments con manual work as beneficial.  Initiate stabilization and strengthening as pain improves.  -           User Key  (r) = Recorded By, (t) = Taken By, (c) = Cosigned By    Initials Name Provider Type    Pat Michel, PT DPT Physical Therapist                   OP Exercises     Row Name 11/18/21 1600             Subjective Comments    Subjective Comments Back felt better after last treatment.  Was sore but hasn't been quite as bad sitting through [employer].  Pain is not as intense.  Takes longer before it starts.  Still in the center part of back.  Went Friday to chiro and goes again tomorrow. I think I am getting some better.  -SW              Subjective Pain    Able to rate subjective pain? yes  -      Pre-Treatment Pain Level 2  -SW      Post-Treatment Pain Level 0  -SW              Exercise 1    Exercise Name 1 opp arm/leg raise.  -SW      Reps 1 10 bilaterally  -            User Key  (r) = Recorded By, (t) = Taken By, (c) = Cosigned By    Initials Name Provider Type    Pat Michel, PT DPT Physical Therapist              Manual Rx (last 36 hours)     Manual Treatments     Row Name 11/18/21 1700              Manual Rx 1    Manual Rx 1 Location QL active trigger release/deactivation and reinforcement.  -            User Key  (r) = Recorded By, (t) = Taken By, (c) = Cosigned By    Initials Name Provider Type    Pat Michel, PT DPT Physical Therapist                           PT OP Goals     Row Name 11/18/21 1600          PT Short Term Goals    STG Date to Achieve 11/25/21  -     STG 1 patient to be independent and compliant with HEP to assist with stretching and mobility  -     STG 1 Progress Met  -     STG 2 patient to recognize seated neutral position and maintain with ther intevention x 20 minutes without cues required.  -     STG 2 Progress Met  -     STG 3 Pt to show improved alignment to pelvic girdle without need for MET required.  -     STG 3 Progress Met  -     STG 4 patient to report pain to 3/10 most days without inc for prolonged seated position.  -     STG 4 Progress Partially Met  -     STG 4 Progress Comments needs consistency  -[facility] 5 patient to present with improved position to lumbosacral region such that lumbar paraspinals are improved length and dec tension with improved fascial glide that doesn't restrict general mobility.  -     STG 5 Progress Progressing  -            Long Term Goals    LTG Date to Achieve 01/27/22  -     LTG 1 patient to report improvement of 70% better overall since induction of PT.  -     LTG 1 Progress New  -     LTG 2 patient to improve seated tolerance of 1.5 hours as needed without inc pain reported.  -     LTG 2 Progress New  -     LTG 3 Meet personal goals of caretaking of kids without modifications required.  -     LTG 3 Progress New  -            Time Calculation    PT Goal Re-Cert Due Date 11/25/21  -           User Key  (r) = Recorded By, (t) = Taken By, (c) = Cosigned By    Initials Name Provider Type    Pat Michel, PT DPT Physical Therapist                 Therapy Education  Given: HEP,  Symptoms/condition management  Program: Reinforced  How Provided: Verbal, Demonstration  Provided to: Patient  Level of Understanding: Teach back education performed, Verbalized, Demonstrated              Time Calculation:   Start Time: 1611  Stop Time: 1700  Time Calculation (min): 49 min  Therapy Charges for Today     Code Description Service Date Service Provider Modifiers Qty    41641681409 HC PT MANUAL THERAPY EA 15 MIN 11/18/2021 Pat Perez, PT DPT  3                    Pat Perez, PT DPT  11/18/2021

## 2021-12-02 ENCOUNTER — HOSPITAL ENCOUNTER (OUTPATIENT)
Dept: PHYSICAL THERAPY | Facility: HOSPITAL | Age: 31
Setting detail: THERAPIES SERIES
Discharge: HOME | End: 2021-12-02

## 2021-12-02 DIAGNOSIS — M53.3 COCCYX PAIN: Primary | ICD-10-CM

## 2021-12-02 PROCEDURE — 97140 MANUAL THERAPY 1/> REGIONS: CPT | Performed by: PHYSICAL THERAPIST

## 2021-12-03 NOTE — THERAPY TREATMENT NOTE
Outpatient Physical Therapy Pelvic Health Progress Note  [facility]     Patient Name: Angélica Hooper  : 1990  MRN: [id number]  Today's Date: 2021        Visit Date: 2021   Visit number 5 out of 5  Recheck 2021    Visit Dx:    ICD-10-CM ICD-9-CM   1. Coccyx pain  M53.3 724.79       Patient Active Problem List   Diagnosis   (none) - all problems resolved or deleted         Pelvic Health     Row Name 21 1500             Subjective Comments    Subjective Comments I have been doing good.  Alright at school but still struggle with Scientologist position.  No longer having trouble with seated position long term, but getting up and down is a trick.  -SW            User Key  (r) = Recorded By, (t) = Taken By, (c) = Cosigned By    Initials Name Provider Type    Pat Michel, PT DPT Physical Therapist               PT Ortho     Row Name 21 1500       Subjective Pain    Pre-Treatment Pain Level 1  -SW    Post-Treatment Pain Level 0  -SW       Posture/Observations    Posture/Observations Comments no distress.  Alignment good this date.  No guarding identified with postural transitions.  -SW       SI/Hip Screen- Lower Quarter Clearing    Pain in Denisha's area Bilateral:; Positive  -SW       Lumbosacral Palpation    Quadratus Lumborum Trigger point; Tender  taut bands have reduced.  Not near the reaction with palpation.  Remains most tender at QL distal attachment bilaterally L>R  -SW    Lumbosacral Palpation Comments Improved fascial mobility to the Q-jemma bilaterally.  There is only minimal amounts of tenderness on the left side compared to the right.  1 region directly at iliac crest attachment was noted to be with active triggering.  Overall low back fascial mobility improved.  Sacral alignment also improved without MET required  -SW       General ROM    GENERAL ROM COMMENTS No functional restriction noted  -SW       MMT (Manual Muscle Testing)    General MMT Comments 5 out  of 5 bilaterally  -SW          User Key  (r) = Recorded By, (t) = Taken By, (c) = Cosigned By    Initials Name Provider Type    Pat Michel PT DPT Physical Therapist                            PT Assessment/Plan     Row Name 12/02/21 1500          PT Assessment    Assessment Comments Patient presents today with improved alignment without muscle energy technique required.  She also was identified with improved myofascial mobility within the Q-jemma region.  She remains tender along the most distal attachment to the ileum.  Today's assessment revealed left side with increased sensitivity than that of the right.  Minimal to no Francis radicular symptoms were noted with palpation to the area.  -SW     Rehab Potential Good  -SW     Patient/caregiver participated in establishment of treatment plan and goals Yes  -SW     Patient would benefit from skilled therapy intervention Yes  -SW            PT Plan    PT Plan Comments Continue manual work as necessary.  Suspect 2 additional visits and transition to discharge at patient's request secondary to deductibles restarting at first of year  -SW           User Key  (r) = Recorded By, (t) = Taken By, (c) = Cosigned By    Initials Name Provider Type    Pat Michel, PT DPT Physical Therapist                   OP Exercises     Row Name 12/02/21 1500             Subjective Comments    Subjective Comments I have been doing good.  Alright at school but still struggle with Orthodoxy position.  No longer having trouble with seated position long term, but getting up and down is a trick.  -SW              Subjective Pain    Pre-Treatment Pain Level 1  -SW      Post-Treatment Pain Level 0  -SW              Exercise 1    Exercise Name 1 Also the arm opposite leg raise  -SW      Sets 1 2  -SW      Reps 1 10  -SW              Exercise 2    Exercise Name 2 Ipsilateral hip hike  -SW      Reps 2 10  -SW              Exercise 3    Exercise Name 3 Manual QL stretch  -SW      Reps  3 3  -SW      Time 3 30 seconds  -            User Key  (r) = Recorded By, (t) = Taken By, (c) = Cosigned By    Initials Name Provider Type    Pat Michel, PT DPT Physical Therapist              Manual Rx (last 36 hours)     Manual Treatments     Row Name 12/02/21 1700             Manual Rx 1    Manual Rx 1 Location QL active trigger release bilaterally with deactivation/reinforcement  -            User Key  (r) = Recorded By, (t) = Taken By, (c) = Cosigned By    Initials Name Provider Type    Pat Michel, PT DPT Physical Therapist                           PT OP Goals     Row Name 12/02/21 1500          PT Short Term Goals    STG Date to Achieve 12/30/21  -     STG 1 patient to be independent and compliant with HEP to assist with stretching and mobility  -     STG 1 Progress Met  -     STG 2 patient to recognize seated neutral position and maintain with ther intevention x 20 minutes without cues required.  -     STG 2 Progress Met  -     STG 3 Pt to show improved alignment to pelvic girdle without need for MET required.  -     STG 3 Progress Met  -     STG 4 patient to report pain to 3/10 most days without inc for prolonged seated position.  -     STG 4 Progress Partially Met  -     STG 5 patient to present with improved position to lumbosacral region such that lumbar paraspinals are improved length and dec tension with improved fascial glide that doesn't restrict general mobility.  -     STG 5 Progress Progressing  -            Long Term Goals    LTG Date to Achieve 01/27/22  -     LTG 1 patient to report improvement of 70% better overall since induction of PT.  -     LTG 1 Progress New  -     LTG 2 patient to improve seated tolerance of 1.5 hours as needed without inc pain reported.  -     LTG 2 Progress New  -     LTG 3 Meet personal goals of caretaking of kids without modifications required.  -     LTG 3 Progress New  -            Time Calculation     PT Goal Re-Cert Due Date 12/30/21  -           User Key  (r) = Recorded By, (t) = Taken By, (c) = Cosigned By    Initials Name Provider Type    Pat Michel, PT DPT Physical Therapist                 Therapy Education  Given: Symptoms/condition management  Program: Reinforced  How Provided: Verbal  Provided to: Patient  Level of Understanding: Teach back education performed, Verbalized, Demonstrated              Time Calculation:   Start Time: 1509  Stop Time: 1555  Time Calculation (min): 46 min  Therapy Charges for Today     Code Description Service Date Service Provider Modifiers Qty    59364667249 HC PT MANUAL THERAPY EA 15 MIN 12/2/2021 Pat Perez, PT DPT GP 3                    Pat Perez, PT DPT  12/2/2021

## 2021-12-20 ENCOUNTER — HOSPITAL ENCOUNTER (OUTPATIENT)
Dept: PHYSICAL THERAPY | Facility: HOSPITAL | Age: 31
Setting detail: THERAPIES SERIES
Discharge: HOME | End: 2021-12-20

## 2021-12-20 DIAGNOSIS — M53.3 COCCYX PAIN: Primary | ICD-10-CM

## 2021-12-20 PROCEDURE — 97110 THERAPEUTIC EXERCISES: CPT | Performed by: PHYSICAL THERAPIST

## 2021-12-20 PROCEDURE — 97140 MANUAL THERAPY 1/> REGIONS: CPT | Performed by: PHYSICAL THERAPIST

## 2021-12-20 NOTE — THERAPY TREATMENT NOTE
Outpatient Physical Therapy Pelvic Health Treatment Note  [facility]     Patient Name: Angélica Hooper  : 1990  MRN: [id number]  Today's Date: 2021        Visit Date: 2021  Visit Number: 6  Recheck: 21    Visit Dx:    ICD-10-CM ICD-9-CM   1. Coccyx pain  M53.3 724.79       Patient Active Problem List   Diagnosis   (none) - all problems resolved or deleted           PT Ortho     Row Name 21 1500       Subjective Comments    Subjective Comments I am doing good.  No tailbone issue.  Little discomfort noted in general.  -SW       Subjective Pain    Pre-Treatment Pain Level 0  -SW    Post-Treatment Pain Level 0  -SW       Posture/Observations    Posture/Observations Comments alignment intact this session.  No asymmetry noted.  Good functional movement noted throughout treatment.  -SW          User Key  (r) = Recorded By, (t) = Taken By, (c) = Cosigned By    Initials Name Provider Type    SW Pat Perez, PT DPT Physical Therapist                            PT Assessment/Plan     Row Name 21 1500          PT Assessment    Assessment Comments Patient is doing well with re: to tailbone pain.  She is sitting with improved comfort.  Understands HEP and progression of program.  Will ensure final HEP understanding next visit and transition toward HEP at that time. Instructed on pelvic floor training as to encourage stabilization from pelvic girdle due to most recent concern with varicose vein.  Verbalized understanding was achieved.  -SW     Rehab Potential Good  -SW     Patient/caregiver participated in establishment of treatment plan and goals Yes  -SW     Patient would benefit from skilled therapy intervention Yes  -SW            PT Plan    PT Plan Comments one additional visit then transition to DC and I home management program. Next visit: teach clamshells, hip abd, ext, quadruped leg/arm raises for HEP advancements.  -SW           User Key  (r) = Recorded By, (t) =  Taken By, (c) = Cosigned By    Initials Name Provider Type    Pat Michel, PT DPT Physical Therapist                   OP Exercises     Row Name 12/20/21 1500             Subjective Comments    Subjective Comments I am doing good.  No tailbone issue.  Little discomfort noted in general.  -SW              Subjective Pain    Pre-Treatment Pain Level 0  -SW      Post-Treatment Pain Level 0  -SW              Exercise 1    Exercise Name 1 hamstring stretch with ER  -SW      Reps 1 3  -SW      Time 1 30 sec  -SW              Exercise 2    Exercise Name 2 adductor stretch  -SW      Reps 2 3  -SW      Time 2 30 sec  -SW              Exercise 3    Exercise Name 3 piriformis standing and supine  -SW      Reps 3 3  -SW      Time 3 30 sec  -SW      Additional Comments each stretch  -SW              Exercise 4    Exercise Name 4 opposite arm/leg raise  -SW      Sets 4 2  -SW      Reps 4 10  -SW              Exercise 5    Exercise Name 5 toileting position  -SW      Additional Comments demonstrated toileting position as to improve evacuation efficiency.  Discussed PF uptraining with diaphragmatic breaths.  -SW              Exercise 6    Exercise Name 6 diaphragmatic breaths with PF activation  -SW      Reps 6 10  -SW              Exercise 7    Exercise Name 7 endurance PF  -SW      Reps 7 10  -SW      Time 7 5 sec  -SW              Exercise 8    Exercise Name 8 pelvic rest position  -SW      Time 8 10 min  -SW      Additional Comments encouraged post activity of prolonged WB and weight lifting.  -SW            User Key  (r) = Recorded By, (t) = Taken By, (c) = Cosigned By    Initials Name Provider Type    Pat Michel, PT DPT Physical Therapist                             PT OP Goals     Row Name 12/20/21 1600          PT Short Term Goals    STG Date to Achieve 12/30/21  -SW     STG 1 patient to be independent and compliant with HEP to assist with stretching and mobility  -SW     STG 1 Progress Met  -SW      STG 2 patient to recognize seated neutral position and maintain with ther intevention x 20 minutes without cues required.  -     STG 2 Progress Met  -     STG 3 Pt to show improved alignment to pelvic girdle without need for MET required.  -     STG 3 Progress Met  -     STG 4 patient to report pain to 3/10 most days without inc for prolonged seated position.  -     STG 4 Progress Met  -     STG 5 patient to present with improved position to lumbosacral region such that lumbar paraspinals are improved length and dec tension with improved fascial glide that doesn't restrict general mobility.  -     STG 5 Progress Progressing  -            Long Term Goals    LTG Date to Achieve 01/27/22  -     LTG 1 patient to report improvement of 70% better overall since induction of PT.  -     LTG 1 Progress Met  -     LTG 2 patient to improve seated tolerance of 1.5 hours as needed without inc pain reported.  -     LTG 2 Progress Progressing  -     LTG 3 Meet personal goals of caretaking of kids without modifications required.  -     LTG 3 Progress Met  -            Time Calculation    PT Goal Re-Cert Due Date 12/30/21  -           User Key  (r) = Recorded By, (t) = Taken By, (c) = Cosigned By    Initials Name Provider Type     Pat Perez, PT DPT Physical Therapist                 Therapy Education  Given: HEP, Symptoms/condition management, Posture/body mechanics  Program: Reinforced  How Provided: Demonstration, Verbal  Provided to: Patient  Level of Understanding: Verbalized, Demonstrated, Teach back education performed              Time Calculation:   Start Time: 1502  Stop Time: 1605  Time Calculation (min): 63 min  Therapy Charges for Today     Code Description Service Date Service Provider Modifiers Qty    33435366195 HC PT MANUAL THERAPY EA 15 MIN 12/20/2021 Pat Perez, PT DPT GP 2    82283273551 HC PT THER PROC EA 15 MIN 12/20/2021 Pat Perez, PT DPT GP 2                     Pat Perez, PT DPT  12/20/2021

## 2021-12-27 ENCOUNTER — HOSPITAL ENCOUNTER (OUTPATIENT)
Dept: PHYSICAL THERAPY | Facility: HOSPITAL | Age: 31
Setting detail: THERAPIES SERIES
Discharge: HOME | End: 2021-12-27

## 2021-12-27 DIAGNOSIS — M53.3 COCCYX PAIN: Primary | ICD-10-CM

## 2021-12-27 PROCEDURE — 97110 THERAPEUTIC EXERCISES: CPT | Performed by: PHYSICAL THERAPIST

## 2021-12-27 PROCEDURE — 97140 MANUAL THERAPY 1/> REGIONS: CPT | Performed by: PHYSICAL THERAPIST

## 2021-12-27 NOTE — THERAPY DISCHARGE NOTE
Outpatient Physical Therapy Pelvic Health Progress Note/Discharge Summary  [facility]     Patient Name: Angélica Hooper  : 1990  MRN: [id number]  Today's Date: 2021        Visit Date: 2021   Visit number: 7  Recheck: NA due to DC today      Visit Dx:    ICD-10-CM ICD-9-CM   1. Coccyx pain  M53.3 724.79       Patient Active Problem List   Diagnosis   (none) - all problems resolved or deleted           PT Ortho     Row Name 21 1100       Subjective Comments    Subjective Comments Nothing new or different.  I am better.  When I am moving around and busy I do not hurt.  Noticed more with attempts to sit for long periods.  Sitting at [employer] remains the hardest.  -SW       Subjective Pain    Pre-Treatment Pain Level 0  -SW    Post-Treatment Pain Level 0  -SW       Posture/Observations    Posture/Observations Comments cont to show good alignment with intact symmetry to innominate and sacrum.  -SW       DTR- Lower Quarter Clearing    Patellar tendon (L2-4) 2- Normal response  -SW    Achilles tendon (S1-2) 2- Normal response  -SW       Neural Tension Signs- Lower Quarter Clearing    Slump Negative  -SW    SLR Negative  -SW       Sensory Screen for Light Touch- Lower Quarter Clearing    L1 (inguinal area) Intact  -SW    L2 (anterior mid thigh) Intact  -SW    L3 (distal anterior thigh) Intact  -SW    L4 (medial lower leg/foot) Intact  -SW    L5 (lateral lower leg/great toe) Intact  -SW    S1 (bottom of foot) Intact  -SW       Myotomal Screen- Lower Quarter Clearing    Hip flexion (L2) 5 (Normal)  -SW    Knee extension (L3) 5 (Normal)  -SW    Knee flexion (S2) 5 (Normal)  -SW       Lumbar ROM Screen- Lower Quarter Clearing    Lumbar Flexion Normal  -SW    Lumbar Extension Normal  -SW    Lumbar Lateral Flexion Normal  -SW    Lumbar Rotation Normal  -SW       SI/Hip Screen- Lower Quarter Clearing    ASIS compression Negative  -SW    ASIS distraction Negative  -SW    Mary's/Oswaldo's test  Negative  -SW    Posterior thigh sheer Negative  -SW    Pain in Denisha's area Right:; Positive  -SW       Lumbosacral Accessory Motions    PA glide- Sacral base --  sacral alignment good but ttp at R sacral base.  -SW    Innominate rotation --  symmetrical  -SW       Lumbosacral Palpation    Piriformis Right:; Tender; Guarded/taut; Fibrotic; Trigger point  -SW    Quadratus Lumborum --  much improved fascial mobility; no active triggers noted.  Small latent point along distal attachment of ilium.  -SW    Lumbosacral Palpation Comments Patient presents with good alignment to SI and innominates bilaterally.  Mild tenderness at distal attachment of QL onto ilium on R side.  No active trigger with radiation of pain noted. New region of thickened tissue to piriformis/glut region of R side that is tender and extremely dense with palpation.  Inc tenderness noted with palpation to region.  Mild radicular s/s noted.  -SW       General ROM    GENERAL ROM COMMENTS no functional restrictions noted.  -SW       MMT (Manual Muscle Testing)    General MMT Comments 5/5 bilateral LE  -SW       Balance Skills Training    Balance Comments normal  -SW       Transfers    Comment (Transfers) independent without assist required.  -SW       Gait/Stairs (Locomotion)    Comment (Gait/Stairs) unremarkable.  -SW          User Key  (r) = Recorded By, (t) = Taken By, (c) = Cosigned By    Initials Name Provider Type    Pat Michel, PT DPT Physical Therapist                            PT Assessment/Plan     Row Name 12/27/21 0900          PT Assessment    Assessment Comments Patient is requesting DC this date due to restart of deductibles.  Patient has progressed well without complaints, but remains triggered by prolonged seated position on hard surfaces.  Pt feels she can manage on  her own at this time.  Alignment has remained good.  Instructed her on exercises to assist with stabilization post d/c.  She will consult as needed. All STG  and LTG met except partial meet of LTG 2.  -SW     Rehab Potential Good  -SW     Patient/caregiver participated in establishment of treatment plan and goals Yes  -SW     Patient would benefit from skilled therapy intervention Yes  -SW            PT Plan    PT Plan Comments DC this date at patient request.  Pt will cont with HEP as assigned and contact with questions or concerns she may have.  -           User Key  (r) = Recorded By, (t) = Taken By, (c) = Cosigned By    Initials Name Provider Type    Pat Michel, PT DPT Physical Therapist                     OP Exercises     Row Name 12/27/21 1100 12/27/21 0900          Subjective Comments    Subjective Comments Nothing new or different.  I am better.  When I am moving around and busy I do not hurt.  Noticed more with attempts to sit for long periods.  Sitting at [employer] remains the hardest.  -SW --            Subjective Pain    Pre-Treatment Pain Level 0  -SW --     Post-Treatment Pain Level 0  -SW --            Exercise 1    Exercise Name 1 -- hamstring stretch  -SW     Reps 1 -- 2  -SW     Time 1 -- 30 sec  -SW            Exercise 2    Exercise Name 2 -- addcutor stretch  -SW     Reps 2 -- 2  -SW     Time 2 -- 30 sec  -SW            Exercise 3    Exercise Name 3 -- piriformis stretch  -SW     Reps 3 -- 3  -SW     Time 3 -- 30 sec  -SW            Exercise 4    Exercise Name 4 -- clamshells  -SW     Reps 4 -- 15  -SW     Additional Comments -- bilaterally  -SW            Exercise 5    Exercise Name 5 -- hip abduction SL  -SW     Reps 5 -- 15  -SW     Additional Comments -- bilaterally  -SW            Exercise 6    Exercise Name 6 -- SL adduction  -SW     Reps 6 -- 15  -SW     Additional Comments -- bilaterally  -SW            Exercise 7    Exercise Name 7 -- bridging  -SW     Sets 7 -- 2  -SW     Reps 7 -- 15  -SW     Additional Comments -- bilaterally  -SW            Exercise 8    Exercise Name 8 -- bridging with SLR  -SW     Reps 8 -- 10  -SW      Additional Comments -- challenging for patient.  -           User Key  (r) = Recorded By, (t) = Taken By, (c) = Cosigned By    Initials Name Provider Type    SW Pat Perez, PT DPT Physical Therapist              Manual Rx (last 36 hours)     Manual Treatments     Row Name 12/27/21 1100             Manual Rx 1    Manual Rx 1 Location manual alignment assessment  -              Manual Rx 2    Manual Rx 2 Location QL release at ilium  -              Manual Rx 3    Manual Rx 3 Location piriformis release R with deactivation and reinforcement  -            User Key  (r) = Recorded By, (t) = Taken By, (c) = Cosigned By    Initials Name Provider Type    SW Pat Perez, PT DPT Physical Therapist                           PT OP Goals     Row Name 12/27/21 0900          PT Short Term Goals    STG Date to Achieve 12/30/21  -     STG 1 patient to be independent and compliant with HEP to assist with stretching and mobility  -     STG 1 Progress Met  -     STG 2 patient to recognize seated neutral position and maintain with ther intevention x 20 minutes without cues required.  -     STG 2 Progress Met  -     STG 3 Pt to show improved alignment to pelvic girdle without need for MET required.  -     STG 3 Progress Met  -     STG 4 patient to report pain to 3/10 most days without inc for prolonged seated position.  -     STG 4 Progress Met  -     STG 5 patient to present with improved position to lumbosacral region such that lumbar paraspinals are improved length and dec tension with improved fascial glide that doesn't restrict general mobility.  -     STG 5 Progress Met  -            Long Term Goals    LTG Date to Achieve 01/27/22  -     LTG 1 patient to report improvement of 70% better overall since induction of PT.  -     LTG 1 Progress Met  -     LTG 2 patient to improve seated tolerance of 1.5 hours as needed without inc pain reported.  -     LTG 2 Progress Partially Met   -     LTG 2 Progress Comments met except for Uatsdin seated posture on pew.  -     LTG 3 Meet personal goals of caretaking of kids without modifications required.  [facility]  -           User Key  (r) = Recorded By, (t) = Taken By, (c) = Cosigned By    Initials Name Provider Type    Pat Michel, PT DPT Physical Therapist                 Therapy Education  Given: HEP, Pain management, Symptoms/condition management, Posture/body mechanics            Time Calculation:   Start Time: 0927  Stop Time: 1026  Time Calculation (min): 59 min    Therapy Charges for Today     Code Description Service Date Service Provider Modifiers Qty    27704823006  PT THER PROC EA 15 MIN 12/27/2021 Pat Perez, PT DPT GP 2    [id number]  PT MANUAL THERAPY EA 15 MIN 12/27/2021 Pat Perez, PT DPT GP 2                      Pat Perez PT DPT  12/27/2021

## 2022-06-27 ENCOUNTER — HOSPITAL ENCOUNTER (EMERGENCY)
Facility: HOSPITAL | Age: 32
Discharge: HOME | End: 2022-06-27
Attending: EMERGENCY MEDICINE | Admitting: STUDENT IN AN ORGANIZED HEALTH CARE EDUCATION/TRAINING PROGRAM

## 2022-06-27 ENCOUNTER — APPOINTMENT (OUTPATIENT)
Dept: ULTRASOUND IMAGING | Facility: HOSPITAL | Age: 32
End: 2022-06-27

## 2022-06-27 VITALS
TEMPERATURE: 97.8 F | BODY MASS INDEX: 26.77 KG/M2 | RESPIRATION RATE: 20 BRPM | SYSTOLIC BLOOD PRESSURE: 140 MMHG | WEIGHT: 187 LBS | HEIGHT: 70 IN | HEART RATE: 74 BPM | OXYGEN SATURATION: 100 % | DIASTOLIC BLOOD PRESSURE: 94 MMHG

## 2022-06-27 DIAGNOSIS — R10.9 ABDOMINAL PAIN, UNSPECIFIED ABDOMINAL LOCATION: Primary | ICD-10-CM

## 2022-06-27 LAB
ALBUMIN SERPL-MCNC: 4.1 G/DL (ref 3.5–5.2)
ALBUMIN/GLOB SERPL: 1.1 G/DL
ALP SERPL-CCNC: 65 U/L (ref 39–117)
ALT SERPL W P-5'-P-CCNC: 21 U/L (ref 1–33)
ANION GAP SERPL CALCULATED.3IONS-SCNC: 9 MMOL/L (ref 5–15)
AST SERPL-CCNC: 21 U/L (ref 1–32)
BACTERIA UR QL AUTO: NORMAL /HPF
BILIRUB SERPL-MCNC: 0.3 MG/DL (ref 0–1.2)
BILIRUB UR QL STRIP: NEGATIVE
BUN SERPL-MCNC: 11 MG/DL (ref 6–20)
BUN/CREAT SERPL: 15.5 (ref 7–25)
CALCIUM SPEC-SCNC: 8.7 MG/DL (ref 8.6–10.5)
CHLORIDE SERPL-SCNC: 104 MMOL/L (ref 98–107)
CLARITY UR: CLEAR
CO2 SERPL-SCNC: 24 MMOL/L (ref 22–29)
COLOR UR: YELLOW
CREAT SERPL-MCNC: 0.71 MG/DL (ref 0.57–1)
DEPRECATED RDW RBC AUTO: 40.3 FL (ref 37–54)
EGFRCR SERPLBLD CKD-EPI 2021: 116.7 ML/MIN/1.73
EOSINOPHIL # BLD MANUAL: 0.04 10*3/MM3 (ref 0–0.4)
EOSINOPHIL NFR BLD MANUAL: 1 % (ref 0.3–6.2)
ERYTHROCYTE [DISTWIDTH] IN BLOOD BY AUTOMATED COUNT: 12.8 % (ref 12.3–15.4)
GLOBULIN UR ELPH-MCNC: 3.7 GM/DL
GLUCOSE SERPL-MCNC: 108 MG/DL (ref 65–99)
GLUCOSE UR STRIP-MCNC: NEGATIVE MG/DL
HCG SERPL QL: NEGATIVE
HCT VFR BLD AUTO: 39.7 % (ref 34–46.6)
HGB BLD-MCNC: 13.9 G/DL (ref 12–15.9)
HGB UR QL STRIP.AUTO: NEGATIVE
HOLD SPECIMEN: NORMAL
HYALINE CASTS UR QL AUTO: NORMAL /LPF
KETONES UR QL STRIP: NEGATIVE
LEUKOCYTE ESTERASE UR QL STRIP.AUTO: ABNORMAL
LIPASE SERPL-CCNC: 27 U/L (ref 13–60)
LYMPHOCYTES # BLD MANUAL: 2.27 10*3/MM3 (ref 0.7–3.1)
LYMPHOCYTES NFR BLD MANUAL: 19 % (ref 5–12)
MCH RBC QN AUTO: 30.3 PG (ref 26.6–33)
MCHC RBC AUTO-ENTMCNC: 35 G/DL (ref 31.5–35.7)
MCV RBC AUTO: 86.7 FL (ref 79–97)
MONOCYTES # BLD: 0.77 10*3/MM3 (ref 0.1–0.9)
NEUTROPHILS # BLD AUTO: 0.97 10*3/MM3 (ref 1.7–7)
NEUTROPHILS NFR BLD MANUAL: 23 % (ref 42.7–76)
NEUTS BAND NFR BLD MANUAL: 1 % (ref 0–5)
NITRITE UR QL STRIP: NEGATIVE
PH UR STRIP.AUTO: 6 [PH] (ref 5–9)
PLATELET # BLD AUTO: 233 10*3/MM3 (ref 140–450)
PMV BLD AUTO: 9.8 FL (ref 6–12)
POTASSIUM SERPL-SCNC: 3.6 MMOL/L (ref 3.5–5.2)
PROT SERPL-MCNC: 7.8 G/DL (ref 6–8.5)
PROT UR QL STRIP: NEGATIVE
RBC # BLD AUTO: 4.58 10*6/MM3 (ref 3.77–5.28)
RBC # UR STRIP: NORMAL /HPF
RBC MORPH BLD: NORMAL
REF LAB TEST METHOD: NORMAL
SMALL PLATELETS BLD QL SMEAR: ADEQUATE
SODIUM SERPL-SCNC: 137 MMOL/L (ref 136–145)
SP GR UR STRIP: 1.01 (ref 1–1.03)
SQUAMOUS #/AREA URNS HPF: NORMAL /HPF
UROBILINOGEN UR QL STRIP: ABNORMAL
VARIANT LYMPHS NFR BLD MANUAL: 56 % (ref 19.6–45.3)
WBC # UR STRIP: NORMAL /HPF
WBC MORPH BLD: NORMAL
WBC NRBC COR # BLD: 4.06 10*3/MM3 (ref 3.4–10.8)
WHOLE BLOOD HOLD COAG: NORMAL
WHOLE BLOOD HOLD SPECIMEN: NORMAL

## 2022-06-27 PROCEDURE — 84703 CHORIONIC GONADOTROPIN ASSAY: CPT | Performed by: EMERGENCY MEDICINE

## 2022-06-27 PROCEDURE — 83690 ASSAY OF LIPASE: CPT | Performed by: EMERGENCY MEDICINE

## 2022-06-27 PROCEDURE — 80053 COMPREHEN METABOLIC PANEL: CPT | Performed by: EMERGENCY MEDICINE

## 2022-06-27 PROCEDURE — 81001 URINALYSIS AUTO W/SCOPE: CPT | Performed by: EMERGENCY MEDICINE

## 2022-06-27 PROCEDURE — 85025 COMPLETE CBC W/AUTO DIFF WBC: CPT | Performed by: EMERGENCY MEDICINE

## 2022-06-27 PROCEDURE — 99283 EMERGENCY DEPT VISIT LOW MDM: CPT

## 2022-06-27 PROCEDURE — 85007 BL SMEAR W/DIFF WBC COUNT: CPT | Performed by: EMERGENCY MEDICINE

## 2022-06-27 PROCEDURE — 76705 ECHO EXAM OF ABDOMEN: CPT

## 2022-06-27 RX ORDER — DICYCLOMINE HCL 20 MG
20 TABLET ORAL 4 TIMES DAILY
COMMUNITY
Start: 2022-06-14 | End: 2022-11-21

## 2022-06-27 RX ORDER — NORGESTIMATE AND ETHINYL ESTRADIOL 0.25-0.035
1 KIT ORAL DAILY
COMMUNITY

## 2022-06-27 RX ORDER — ATORVASTATIN CALCIUM 20 MG/1
20 TABLET, FILM COATED ORAL NIGHTLY
COMMUNITY
Start: 2022-06-14 | End: 2023-06-15

## 2022-06-27 RX ORDER — SODIUM CHLORIDE 0.9 % (FLUSH) 0.9 %
10 SYRINGE (ML) INJECTION AS NEEDED
Status: DISCONTINUED | OUTPATIENT
Start: 2022-06-27 | End: 2022-06-27 | Stop reason: HOSPADM

## 2022-06-28 ENCOUNTER — OFFICE VISIT (OUTPATIENT)
Dept: GASTROENTEROLOGY | Facility: CLINIC | Age: 32
End: 2022-06-28

## 2022-06-28 VITALS
WEIGHT: 140.4 LBS | HEART RATE: 89 BPM | SYSTOLIC BLOOD PRESSURE: 138 MMHG | HEIGHT: 70 IN | DIASTOLIC BLOOD PRESSURE: 87 MMHG | BODY MASS INDEX: 20.1 KG/M2

## 2022-06-28 DIAGNOSIS — R19.7 DIARRHEA, UNSPECIFIED TYPE: ICD-10-CM

## 2022-06-28 DIAGNOSIS — R10.12 LEFT UPPER QUADRANT ABDOMINAL PAIN: ICD-10-CM

## 2022-06-28 DIAGNOSIS — R11.0 NAUSEA: ICD-10-CM

## 2022-06-28 DIAGNOSIS — R10.11 RIGHT UPPER QUADRANT ABDOMINAL PAIN: Primary | ICD-10-CM

## 2022-06-28 PROCEDURE — 99204 OFFICE O/P NEW MOD 45 MIN: CPT | Performed by: INTERNAL MEDICINE

## 2022-06-28 RX ORDER — DEXTROSE AND SODIUM CHLORIDE 5; .45 G/100ML; G/100ML
30 INJECTION, SOLUTION INTRAVENOUS CONTINUOUS PRN
Status: CANCELLED | OUTPATIENT
Start: 2022-06-30

## 2022-06-30 ENCOUNTER — ANESTHESIA (OUTPATIENT)
Dept: GASTROENTEROLOGY | Facility: HOSPITAL | Age: 32
End: 2022-06-30

## 2022-06-30 ENCOUNTER — ANESTHESIA EVENT (OUTPATIENT)
Dept: GASTROENTEROLOGY | Facility: HOSPITAL | Age: 32
End: 2022-06-30

## 2022-06-30 ENCOUNTER — HOSPITAL ENCOUNTER (OUTPATIENT)
Facility: HOSPITAL | Age: 32
Setting detail: HOSPITAL OUTPATIENT SURGERY
Discharge: HOME | End: 2022-06-30
Attending: INTERNAL MEDICINE | Admitting: INTERNAL MEDICINE

## 2022-06-30 VITALS
HEART RATE: 76 BPM | TEMPERATURE: 98 F | RESPIRATION RATE: 18 BRPM | BODY MASS INDEX: 20.04 KG/M2 | DIASTOLIC BLOOD PRESSURE: 75 MMHG | WEIGHT: 140 LBS | HEIGHT: 70 IN | OXYGEN SATURATION: 100 % | SYSTOLIC BLOOD PRESSURE: 127 MMHG

## 2022-06-30 DIAGNOSIS — R10.11 RIGHT UPPER QUADRANT ABDOMINAL PAIN: ICD-10-CM

## 2022-06-30 DIAGNOSIS — R11.0 NAUSEA: ICD-10-CM

## 2022-06-30 DIAGNOSIS — R19.7 DIARRHEA, UNSPECIFIED TYPE: ICD-10-CM

## 2022-06-30 DIAGNOSIS — R10.12 LEFT UPPER QUADRANT ABDOMINAL PAIN: ICD-10-CM

## 2022-06-30 PROCEDURE — 25010000002 PROPOFOL 10 MG/ML EMULSION: Performed by: NURSE ANESTHETIST, CERTIFIED REGISTERED

## 2022-06-30 PROCEDURE — 45380 COLONOSCOPY AND BIOPSY: CPT | Performed by: INTERNAL MEDICINE

## 2022-06-30 PROCEDURE — 43239 EGD BIOPSY SINGLE/MULTIPLE: CPT | Performed by: INTERNAL MEDICINE

## 2022-06-30 RX ORDER — ONDANSETRON 2 MG/ML
4 INJECTION INTRAMUSCULAR; INTRAVENOUS ONCE AS NEEDED
Status: DISCONTINUED | OUTPATIENT
Start: 2022-06-30 | End: 2022-06-30 | Stop reason: HOSPADM

## 2022-06-30 RX ORDER — LIDOCAINE HYDROCHLORIDE 20 MG/ML
INJECTION, SOLUTION INTRAVENOUS AS NEEDED
Status: DISCONTINUED | OUTPATIENT
Start: 2022-06-30 | End: 2022-06-30 | Stop reason: SURG

## 2022-06-30 RX ORDER — PROPOFOL 10 MG/ML
VIAL (ML) INTRAVENOUS AS NEEDED
Status: DISCONTINUED | OUTPATIENT
Start: 2022-06-30 | End: 2022-06-30 | Stop reason: SURG

## 2022-06-30 RX ORDER — DEXTROSE AND SODIUM CHLORIDE 5; .45 G/100ML; G/100ML
30 INJECTION, SOLUTION INTRAVENOUS CONTINUOUS PRN
Status: DISCONTINUED | OUTPATIENT
Start: 2022-06-30 | End: 2022-06-30 | Stop reason: HOSPADM

## 2022-06-30 RX ADMIN — PROPOFOL 20 MG: 10 INJECTION, EMULSION INTRAVENOUS at 13:52

## 2022-06-30 RX ADMIN — LIDOCAINE HYDROCHLORIDE 60 MG: 20 INJECTION, SOLUTION INTRAVENOUS at 13:40

## 2022-06-30 RX ADMIN — PROPOFOL 20 MG: 10 INJECTION, EMULSION INTRAVENOUS at 13:50

## 2022-06-30 RX ADMIN — PROPOFOL 20 MG: 10 INJECTION, EMULSION INTRAVENOUS at 13:48

## 2022-06-30 RX ADMIN — PROPOFOL 10 MG: 10 INJECTION, EMULSION INTRAVENOUS at 13:54

## 2022-06-30 RX ADMIN — PROPOFOL 20 MG: 10 INJECTION, EMULSION INTRAVENOUS at 13:44

## 2022-06-30 RX ADMIN — PROPOFOL 70 MG: 10 INJECTION, EMULSION INTRAVENOUS at 13:40

## 2022-06-30 RX ADMIN — DEXTROSE AND SODIUM CHLORIDE 30 ML/HR: 5; 450 INJECTION, SOLUTION INTRAVENOUS at 13:17

## 2022-06-30 RX ADMIN — PROPOFOL 20 MG: 10 INJECTION, EMULSION INTRAVENOUS at 13:46

## 2022-06-30 RX ADMIN — PROPOFOL 20 MG: 10 INJECTION, EMULSION INTRAVENOUS at 13:42

## 2022-06-30 NOTE — ANESTHESIA PREPROCEDURE EVALUATION
Anesthesia Evaluation     Patient summary reviewed and Nursing notes reviewed   NPO Solid Status: > 8 hours  NPO Liquid Status: < 2 hours           Airway   Mallampati: II  TM distance: <3 FB  Neck ROM: full  Possible difficult intubation  Dental      Pulmonary - normal exam   (+) a smoker Former,   Cardiovascular - normal exam    Rhythm: regular  Rate: normal    (+) hyperlipidemia,     ROS comment: Reports of heart palpitations in past, investigated prior to admittance.   PE comment: Previous reports of heart palpitations, none present today upon preoperative assessment    Neuro/Psych- negative ROS  GI/Hepatic/Renal/Endo - negative ROS     Musculoskeletal (-) negative ROS    Abdominal    Substance History - negative use     OB/GYN    (-)  Pregnant        Other - negative ROS                       Anesthesia Plan    ASA 2     MAC   total IV anesthesia  intravenous induction     Anesthetic plan, risks, benefits, and alternatives have been provided, discussed and informed consent has been obtained with: patient.

## 2022-06-30 NOTE — ANESTHESIA POSTPROCEDURE EVALUATION
Patient: Angélica Hooper    Procedure Summary     Date: 06/30/22 Room / Location: [facility] ENDOSCOPY 2 / [facility] ENDOSCOPY    Anesthesia Start: 1332 Anesthesia Stop: 1358    Procedures:       ESOPHAGOGASTRODUODENOSCOPY (N/A )      COLONOSCOPY (N/A ) Diagnosis:       Right upper quadrant abdominal pain      Left upper quadrant abdominal pain      Diarrhea, unspecified type      Nausea      (Right upper quadrant abdominal pain [R10.11])      (Left upper quadrant abdominal pain [R10.12])      (Diarrhea, unspecified type [R19.7])      (Nausea [R11.0])    Surgeons: Yo Pinto MD Provider: Henry Menezes CRNA    Anesthesia Type: MAC ASA Status: 2          Anesthesia Type: MAC    Vitals  No vitals data found for the desired time range.          Post Anesthesia Care and Evaluation    Patient location during evaluation: PACU  Level of consciousness: sleepy but conscious  Pain score: 0  Pain management: adequate    Airway patency: patent  Anesthetic complications: No anesthetic complications  PONV Status: none  Cardiovascular status: acceptable and hemodynamically stable  Respiratory status: acceptable and spontaneous ventilation  Hydration status: acceptable

## 2022-07-05 LAB — REF LAB TEST METHOD: NORMAL

## 2022-07-07 ENCOUNTER — TELEPHONE (OUTPATIENT)
Dept: GASTROENTEROLOGY | Facility: CLINIC | Age: 32
End: 2022-07-07

## 2022-07-07 RX ORDER — AMOXICILLIN 500 MG/1
1000 CAPSULE ORAL 2 TIMES DAILY
Qty: 56 CAPSULE | Refills: 0 | Status: SHIPPED | OUTPATIENT
Start: 2022-07-07 | End: 2022-08-01

## 2022-07-07 RX ORDER — CLARITHROMYCIN 500 MG/1
500 TABLET, COATED ORAL 2 TIMES DAILY
Qty: 28 TABLET | Refills: 0 | Status: SHIPPED | OUTPATIENT
Start: 2022-07-07 | End: 2022-08-01

## 2022-07-07 RX ORDER — OMEPRAZOLE 40 MG/1
40 CAPSULE, DELAYED RELEASE ORAL DAILY
Qty: 14 CAPSULE | Refills: 0 | Status: SHIPPED | OUTPATIENT
Start: 2022-07-07 | End: 2022-08-01

## 2022-07-07 NOTE — TELEPHONE ENCOUNTER
07/07/2022, 1342 - Patient telephoned per this staff member [phone] with notification of written order received per Dr. Yo Pinto M.D. - Patient to begin utilization of Clarithromycin 500 MG Tablets, 1 tablet by mouth 2 times per day X 14 days, Amoxicillin 500 MG Capsules, 2 capsules by mouth 2 times per day X 14 days, and Omeprazole 40 MG Capsules, 1 capsule by mouth once daily, as treatment of H. Pylori Infection.  Patient made aware of need to cease utilization of Atorvastatin 20 MG Tablets while utilizing prescription medication therapy for H. Pylori Infection, and made aware prescription medications have been submitted to [facility], [facility].  Patient verbalized understanding.

## 2022-07-12 RX ORDER — METRONIDAZOLE 500 MG/1
500 TABLET ORAL 3 TIMES DAILY
Qty: 42 TABLET | Refills: 0 | Status: SHIPPED | OUTPATIENT
Start: 2022-07-12 | End: 2022-08-01

## 2022-07-13 RX ORDER — ONDANSETRON HYDROCHLORIDE 8 MG/1
8 TABLET, FILM COATED ORAL EVERY 8 HOURS PRN
Qty: 30 TABLET | Refills: 1 | Status: SHIPPED | OUTPATIENT
Start: 2022-07-13 | End: 2022-08-01

## 2022-08-01 ENCOUNTER — OFFICE VISIT (OUTPATIENT)
Dept: GASTROENTEROLOGY | Facility: CLINIC | Age: 32
End: 2022-08-01

## 2022-08-01 VITALS
HEART RATE: 76 BPM | DIASTOLIC BLOOD PRESSURE: 88 MMHG | SYSTOLIC BLOOD PRESSURE: 134 MMHG | BODY MASS INDEX: 20.07 KG/M2 | HEIGHT: 70 IN | WEIGHT: 140.2 LBS

## 2022-08-01 DIAGNOSIS — R10.11 RIGHT UPPER QUADRANT ABDOMINAL PAIN: Primary | ICD-10-CM

## 2022-08-01 PROCEDURE — 99214 OFFICE O/P EST MOD 30 MIN: CPT | Performed by: INTERNAL MEDICINE

## 2022-08-01 RX ORDER — SUCRALFATE 1 G/1
1 TABLET ORAL 4 TIMES DAILY
Qty: 120 TABLET | Refills: 4 | Status: SHIPPED | OUTPATIENT
Start: 2022-08-01 | End: 2022-10-25

## 2022-08-01 RX ORDER — OMEPRAZOLE 40 MG/1
40 CAPSULE, DELAYED RELEASE ORAL DAILY
Qty: 30 CAPSULE | Refills: 11 | Status: SHIPPED | OUTPATIENT
Start: 2022-08-01

## 2022-08-05 ENCOUNTER — HOSPITAL ENCOUNTER (OUTPATIENT)
Dept: CT IMAGING | Facility: HOSPITAL | Age: 32
Discharge: HOME | End: 2022-08-05
Admitting: INTERNAL MEDICINE

## 2022-08-05 DIAGNOSIS — R10.11 RIGHT UPPER QUADRANT ABDOMINAL PAIN: ICD-10-CM

## 2022-08-05 PROCEDURE — 25010000002 IOPAMIDOL 61 % SOLUTION: Performed by: INTERNAL MEDICINE

## 2022-08-05 PROCEDURE — 74170 CT ABD WO CNTRST FLWD CNTRST: CPT

## 2022-08-05 RX ADMIN — IOPAMIDOL 90 ML: 612 INJECTION, SOLUTION INTRAVENOUS at 16:44

## 2022-08-17 ENCOUNTER — OFFICE VISIT (OUTPATIENT)
Dept: GASTROENTEROLOGY | Facility: CLINIC | Age: 32
End: 2022-08-17

## 2022-08-17 VITALS
HEIGHT: 70 IN | SYSTOLIC BLOOD PRESSURE: 145 MMHG | HEART RATE: 81 BPM | WEIGHT: 140.6 LBS | BODY MASS INDEX: 20.13 KG/M2 | DIASTOLIC BLOOD PRESSURE: 97 MMHG

## 2022-08-17 DIAGNOSIS — R10.12 LEFT UPPER QUADRANT ABDOMINAL PAIN: Primary | ICD-10-CM

## 2022-08-17 PROCEDURE — 99213 OFFICE O/P EST LOW 20 MIN: CPT | Performed by: INTERNAL MEDICINE

## 2022-08-20 NOTE — PROGRESS NOTES
Chief Complaint   Patient presents with   • 1 Month Clinical Appointment      Right Upper Quadrant Abdominal Pain   • EGD/Colonoscopy Performed 2022       Troy Hooper is a 32 y.o. female.    History of Present Illness  Patient presented to clinic for follow-up visit today.  Had 2 bouts of abdominal pain with nausea since last visit.  Denied vomiting, diarrhea, constipation, rectal bleeding or weight loss.  Completed Prevpac.  EGD was consistent with esophagitis and gastritis.  Path was consistent with H. pylori gastritis.  Colonoscopy was consistent with hemorrhoids.  Path was unremarkable.       The following portions of the patient's history were reviewed and updated as appropriate:   Past Medical History:   Diagnosis Date   • Depression    • Hyperlipidemia    • Palpitations      Past Surgical History:   Procedure Laterality Date   • ADENOIDECTOMY     • COLONOSCOPY N/A 2022    Procedure: COLONOSCOPY;  Surgeon: Yo Pinto MD;  Location: [facility] ENDOSCOPY;  Service: Gastroenterology;  Laterality: N/A;   • ENDOSCOPY N/A 2022    Procedure: ESOPHAGOGASTRODUODENOSCOPY;  Surgeon: Yo Pinto MD;  Location: [facility] ENDOSCOPY;  Service: Gastroenterology;  Laterality: N/A;   • TONSILLECTOMY     • WISDOM TOOTH EXTRACTION       Family History   Problem Relation Age of Onset   • Heart disease Mother    • Hypertension Father    • Idiopathic pulmonary fibrosis Father    • Hypertension Brother    • Heart attack Maternal Grandmother    • Bone cancer Maternal Grandfather    • Cirrhosis Maternal Grandfather    • Idiopathic pulmonary fibrosis Paternal Grandmother    • No Known Problems Son    • No Known Problems Son    • Stroke Other    • Diabetes Other    • Liver disease Other    • Crohn's disease Other      OB History        4    Para   3    Term   3            AB   1    Living   3       SAB   1    IAB        Ectopic        Molar        Multiple   0    Live  Births   3              Prior to Admission medications    Medication Sig Start Date End Date Taking? Authorizing Provider   atorvastatin (LIPITOR) 20 MG tablet Take 20 mg by mouth Daily. 6/14/22 6/15/23 Yes Alina Viavs MD   dicyclomine (BENTYL) 20 MG tablet Take 20 mg by mouth 4 (Four) Times a Day. 22  Yes ProviderAlina MD   norgestimate-ethinyl estradiol (ORTHO-CYCLEN) 0.25-35 MG-MCG per tablet Take 1 tablet by mouth Daily.   Yes ProviderAlina MD   sertraline (ZOLOFT) 50 MG tablet Take 50 mg by mouth Daily. 22 Yes ProviderAlina MD   omeprazole (priLOSEC) 40 MG capsule Take 1 capsule by mouth Daily. 22   Yo Pinto MD   sucralfate (Carafate) 1 g tablet Take 1 tablet by mouth 4 (Four) Times a Day for 30 days. 22  Yo Pinto MD     Allergies   Allergen Reactions   • Amoxicillin Rash   • Penicillins Rash     Social History     Socioeconomic History   • Marital status:    Tobacco Use   • Smoking status: Former Smoker     Packs/day: 0.25     Years: 1.00     Pack years: 0.25     Quit date:      Years since quittin.6   • Smokeless tobacco: Never Used   Vaping Use   • Vaping Use: Never used   Substance and Sexual Activity   • Alcohol use: No   • Drug use: No   • Sexual activity: Yes     Partners: Male     Birth control/protection: Pill     Comment: LAST PAP SMEAR 2017 negative       Review of Systems  Review of Systems   Constitutional: Negative for chills, fatigue, fever and unexpected weight change.   HENT: Negative for congestion, ear discharge, hearing loss, nosebleeds and sore throat.    Eyes: Negative for pain, discharge and redness.   Respiratory: Negative for cough, chest tightness, shortness of breath and wheezing.    Cardiovascular: Negative for chest pain and palpitations.   Gastrointestinal: Positive for abdominal pain and nausea. Negative for abdominal distention, blood in stool, constipation, diarrhea and  "vomiting.   Endocrine: Negative for cold intolerance, polydipsia, polyphagia and polyuria.   Genitourinary: Negative for dysuria, flank pain, frequency, hematuria and urgency.   Musculoskeletal: Negative for arthralgias, back pain, joint swelling and myalgias.   Skin: Negative for color change, pallor and rash.   Neurological: Negative for tremors, seizures, syncope, weakness and headaches.   Hematological: Negative for adenopathy. Does not bruise/bleed easily.   Psychiatric/Behavioral: Negative for behavioral problems, confusion, dysphoric mood, hallucinations and suicidal ideas. The patient is not nervous/anxious.         /88   Pulse 76   Ht 177.8 cm (70\")   Wt 63.6 kg (140 lb 3.2 oz)   BMI 20.12 kg/m²     Objective    Physical Exam  Constitutional:       Appearance: She is well-developed.   HENT:      Head: Normocephalic and atraumatic.   Eyes:      Conjunctiva/sclera: Conjunctivae normal.      Pupils: Pupils are equal, round, and reactive to light.   Neck:      Thyroid: No thyromegaly.   Cardiovascular:      Rate and Rhythm: Normal rate and regular rhythm.      Heart sounds: Normal heart sounds. No murmur heard.  Pulmonary:      Effort: Pulmonary effort is normal.      Breath sounds: Normal breath sounds. No wheezing.   Abdominal:      General: Bowel sounds are normal. There is no distension.      Palpations: Abdomen is soft. There is no mass.      Tenderness: There is no abdominal tenderness.      Hernia: No hernia is present.   Genitourinary:     Comments: No lesions noted  Musculoskeletal:         General: No tenderness. Normal range of motion.      Cervical back: Normal range of motion and neck supple.   Lymphadenopathy:      Cervical: No cervical adenopathy.   Skin:     General: Skin is warm and dry.      Findings: No rash.   Neurological:      Mental Status: She is alert and oriented to person, place, and time.      Cranial Nerves: No cranial nerve deficit.   Psychiatric:         Thought Content: " Thought content normal.       Admission on 2022, Discharged on 2022   Component Date Value Ref Range Status   • Reference Lab Report 2022    Final                    Value:[facility]  [address] [place] [address]  Phone: [phone] or [phone]  Fax: [phone]  Chinedu Diaz M.D., Medical Director    PATIENT NAME                           LABORATORY NO.  SYDNEE MARTINEZ.                  [id number]  [id number]                         AGE              SEX  SSN           CLIENT REF #  [facility]           32      1990  F    [id number]   [id number]    [place]                       REQUESTING M.D.     ATTENDING M.D.     COPY TO.  [address] [place] [address]                 VINAYAK WALSH [address]  DATE COLLECTED      DATE RECEIVED      DATE REPORTED  2022    DIAGNOSIS:  A.   SMALL BOWEL, BIOPSY:  Normal small bowel architecture with increased intraepithelial  lymphocytes (modified Marsh type 1)  B.   ANTRUM, BIOPSY:  Active chronic gastritis  Positive for Helicobacter pylori on                           routine staining  C.   GE JUNCTION:  Inflamed gastric mucosa and benign squamous mucosa  Positive for Helicobacter pylori on routine staining  Negative for specialized Rod's mucosa  D.   TERMINAL ILEUM BIOPSY:  No significant histologic abnormality  E.   COLON, BIOPSY:  No significant histologic abnormality    JBS/pah    COMMENT:  The biopsy of small bowel (duodenum) shows increased  intraepithelial lymphocytes with neither crypt hyperplasia nor villous shortening.  Consider a mild form of celiac disease vs. Helicobacter associated duodenitis vs.  other cause of lymphocytic enteritis.    CLINICAL HISTORY:  Right upper quadrant abdominal pain, left upper quadrant abdominal pain,  Diarrhea of unspecified type,  "nausea    SPECIMENS RECEIVED:  A.  SMALL BOWEL, BIOPSY  B.  ANTRUM, BIOPSY  C.  GE JUNCTION  D.  TERMINAL ILEUM BIOPSY  E.  COLON, BIOPSY    MICROSCOPIC DESCRIPTION:  Tissue blocks are prepared and slides are examined microscopically on all  specimens. See diagnosis for                           details.    Professional interpretation rendered by Gilbert Bella M.D. at [facility],  [facility], [address] [place] [address].    GROSS DESCRIPTION:  A.  Specimen received in 1 formalin filled container labeled \"small bowel  biopsy\" consists of multiple pieces of tan-gray soft tissue measuring 1.1 x  0.3 x 0.2 cm in aggregate.  All submitted in 1 cassette.  BKO  B.  Specimen received in 1 formalin filled container labeled \"antrum biopsy\"  consists of 3 pieces of tan soft tissue measuring 0.9 x 0.3 x 0.2 centimeters  in aggregate.  All submitted in 1 cassette.  C.  Specimen received in 1 formalin filled container labeled \"EG junction  biopsy\" consists of 3 pieces of tan-gray soft tissue measuring 0.8 x 0.3 x  0.2 cm in aggregate.  All submitted in 1 cassette.  D.  Specimen received in 1 formalin filled container labeled \"terminal ileum  biopsy\" consists of multiple pieces of tan-gray soft tissue measuring 1.0 x  0.3 x 0.2 cm in aggregate.  All submitted in 1 cassette.  E.                            Specimen received in 1 formalin filled container labeled \"colonic mucosa  biopsy\" consists of 2 pieces of tan-gray soft tissue measuring 0.7 x 0.3 x  0.2 cm in aggregate.  All submitted in 1 cassette.  BKO    REVIEWED, DIAGNOSED AND ELECTRONICALLY  SIGNED BY:    Gilbert Bella M.D.  CPT CODES:  88305x5       Assessment & Plan      1. Right upper quadrant abdominal pain    1.  Bilateral upper quadrant pain with nausea, likely due to H. pylori gastritis and need to rule out pancreatico biliary pathology.  Patient has completed Prevpac.  Continue Prilosec 40 mg p.o. daily.  Add Carafate 1 g p.o. 4 times daily.  Proceed " with CT abdomen for further evaluation.  2.  Abdominal pain with diarrhea, well controlled.  Continue Bentyl and high-fiber diet.    3.  Weight loss, continue p.o. nutritional supplements.      Orders placed during this encounter include:  Orders Placed This Encounter   Procedures   • CT Abdomen With & Without Contrast     Standing Status:   Future     Number of Occurrences:   1     Standing Expiration Date:   8/1/2023     Order Specific Question:   Will Oral Contrast be needed for this procedure?     Answer:   No     Order Specific Question:   Patient Pregnant     Answer:   No     Order Specific Question:   Release to patient     Answer:   Immediate       * Surgery not found *    Review and/or summary of lab tests, radiology, procedures, medications. Review and summary of old records and obtaining of history. The risks and benefits of my recommendations, as well as other treatment options were discussed with the patient patient today. Questions were answered.    New Medications Ordered This Visit   Medications   • sucralfate (Carafate) 1 g tablet     Sig: Take 1 tablet by mouth 4 (Four) Times a Day for 30 days.     Dispense:  120 tablet     Refill:  4   • omeprazole (priLOSEC) 40 MG capsule     Sig: Take 1 capsule by mouth Daily.     Dispense:  30 capsule     Refill:  11       Follow-up: Return in about 1 month (around 9/1/2022).               Results for orders placed or performed during the hospital encounter of 06/30/22   TISSUE EXAM, [facility] ([facility])    Specimen: A: Small Intestine, Duodenum; Tissue    B: Gastric, Antrum; Tissue    C: Esophagus; Tissue    D: Small Intestine, Ileum; Tissue    E: Large Intestine; Tissue   Result Value Ref Range    Reference Lab Report       [facility]  [address] [place] [address]  Phone: [phone] or [phone]  Fax: [phone]  Chinedu Diaz M.D., Medical Director    PATIENT NAME                           LABORATORY NO.  [address]   SYDNEE MILAN.                  [id number]  [id number]                         AGE              SEX  SSN           CLIENT REF #  [facility]           32      1990  F    [id number]   [id number]    [place]                       REQUESTING M.D.     ATTENDING MMARCELINO.     COPY TO.  [address] [place] [address]                 VIN[place] [address]             SUMALAHUGHA  DATE COLLECTED      DATE RECEIVED      DATE REPORTED  2022    DIAGNOSIS:  A.   SMALL BOWEL, BIOPSY:  Normal small bowel architecture with increased intraepithelial  lymphocytes (modified Marsh type 1)  B.   ANTRUM, BIOPSY:  Active chronic gastritis  Positive for Helicobacter pylori on  routine staining  C.   GE JUNCTION:  Inflamed gastric mucosa and benign squamous mucosa  Positive for Helicobacter pylori on routine staining  Negative for specialized Rod's mucosa  D.   TERMINAL ILEUM BIOPSY:  No significant histologic abnormality  E.   COLON, BIOPSY:  No significant histologic abnormality    JBS/pah    COMMENT:  The biopsy of small bowel (duodenum) shows increased  intraepithelial lymphocytes with neither crypt hyperplasia nor villous shortening.  Consider a mild form of celiac disease vs. Helicobacter associated duodenitis vs.  other cause of lymphocytic enteritis.    CLINICAL HISTORY:  Right upper quadrant abdominal pain, left upper quadrant abdominal pain,  Diarrhea of unspecified type, nausea    SPECIMENS RECEIVED:  A.  SMALL BOWEL, BIOPSY  B.  ANTRUM, BIOPSY  C.  GE JUNCTION  D.  TERMINAL ILEUM BIOPSY  E.  COLON, BIOPSY    MICROSCOPIC DESCRIPTION:  Tissue blocks are prepared and slides are examined microscopically on all  specimens. See diagnosis for  details.    Professional interpretation rendered by Gilbert Bella M.D. at [facility],  [facility], [address] [place] [address].    GROSS DESCRIPTION:  A.  Specimen received in 1  "formalin filled container labeled \"small bowel  biopsy\" consists of multiple pieces of tan-gray soft tissue measuring 1.1 x  0.3 x 0.2 cm in aggregate.  All submitted in 1 cassette.  BKO  B.  Specimen received in 1 formalin filled container labeled \"antrum biopsy\"  consists of 3 pieces of tan soft tissue measuring 0.9 x 0.3 x 0.2 centimeters  in aggregate.  All submitted in 1 cassette.  C.  Specimen received in 1 formalin filled container labeled \"EG junction  biopsy\" consists of 3 pieces of tan-gray soft tissue measuring 0.8 x 0.3 x  0.2 cm in aggregate.  All submitted in 1 cassette.  D.  Specimen received in 1 formalin filled container labeled \"terminal ileum  biopsy\" consists of multiple pieces of tan-gray soft tissue measuring 1.0 x  0.3 x 0.2 cm in aggregate.  All submitted in 1 cassette.  E.   Specimen received in 1 formalin filled container labeled \"colonic mucosa  biopsy\" consists of 2 pieces of tan-gray soft tissue measuring 0.7 x 0.3 x  0.2 cm in aggregate.  All submitted in 1 cassette.  BKO    REVIEWED, DIAGNOSED AND ELECTRONICALLY  SIGNED BY:    Gilbert Bella M.D.  CPT CODES:  88305x5     Results for orders placed or performed during the hospital encounter of 06/27/22   Green Top (Gel)   Result Value Ref Range    Extra Tube Hold for add-ons.    Urinalysis, Microscopic Only - Urine, Clean Catch    Specimen: Urine, Clean Catch   Result Value Ref Range    RBC, UA None Seen None Seen /HPF    WBC, UA 3-5 None Seen, 0-2, 3-5 /HPF    Bacteria, UA None Seen None Seen /HPF    Squamous Epithelial Cells, UA 0-2 None Seen, 0-2 /HPF    Hyaline Casts, UA 0-2 None Seen /LPF    Methodology Automated Microscopy    Urinalysis With Microscopic If Indicated (No Culture) - Urine, Clean Catch    Specimen: Urine, Clean Catch   Result Value Ref Range    Color, UA Yellow Yellow, Straw, Dark Yellow, Joelle    Appearance, UA Clear Clear    pH, UA 6.0 5.0 - 9.0    Specific Gravity, UA 1.007 1.003 - 1.030    Glucose, UA Negative " Negative    Ketones, UA Negative Negative    Bilirubin, UA Negative Negative    Blood, UA Negative Negative    Protein, UA Negative Negative    Leuk Esterase, UA Trace (A) Negative    Nitrite, UA Negative Negative    Urobilinogen, UA 0.2 E.U./dL 0.2 - 1.0 E.U./dL   CBC Auto Differential    Specimen: Blood   Result Value Ref Range    WBC 4.06 3.40 - 10.80 10*3/mm3    RBC 4.58 3.77 - 5.28 10*6/mm3    Hemoglobin 13.9 12.0 - 15.9 g/dL    Hematocrit 39.7 34.0 - 46.6 %    MCV 86.7 79.0 - 97.0 fL    MCH 30.3 26.6 - 33.0 pg    MCHC 35.0 31.5 - 35.7 g/dL    RDW 12.8 12.3 - 15.4 %    RDW-SD 40.3 37.0 - 54.0 fl    MPV 9.8 6.0 - 12.0 fL    Platelets 233 140 - 450 10*3/mm3   Lavender Top   Result Value Ref Range    Extra Tube hold for add-on    Light Blue Top   Result Value Ref Range    Extra Tube Hold for add-ons.    Manual Differential    Specimen: Blood   Result Value Ref Range    Neutrophil % 23.0 (L) 42.7 - 76.0 %    Lymphocyte % 56.0 (H) 19.6 - 45.3 %    Monocyte % 19.0 (H) 5.0 - 12.0 %    Eosinophil % 1.0 0.3 - 6.2 %    Bands %  1.0 0.0 - 5.0 %    Neutrophils Absolute 0.97 (L) 1.70 - 7.00 10*3/mm3    Lymphocytes Absolute 2.27 0.70 - 3.10 10*3/mm3    Monocytes Absolute 0.77 0.10 - 0.90 10*3/mm3    Eosinophils Absolute 0.04 0.00 - 0.40 10*3/mm3    RBC Morphology Normal Normal    WBC Morphology Normal Normal    Platelet Estimate Adequate Normal   hCG, Serum, Qualitative    Specimen: Blood   Result Value Ref Range    HCG Qualitative Negative Negative   Lipase    Specimen: Blood   Result Value Ref Range    Lipase 27 13 - 60 U/L   Comprehensive Metabolic Panel    Specimen: Blood   Result Value Ref Range    Glucose 108 (H) 65 - 99 mg/dL    BUN 11 6 - 20 mg/dL    Creatinine 0.71 0.57 - 1.00 mg/dL    Sodium 137 136 - 145 mmol/L    Potassium 3.6 3.5 - 5.2 mmol/L    Chloride 104 98 - 107 mmol/L    CO2 24.0 22.0 - 29.0 mmol/L    Calcium 8.7 8.6 - 10.5 mg/dL    Total Protein 7.8 6.0 - 8.5 g/dL    Albumin 4.10 3.50 - 5.20 g/dL    ALT  (SGPT) 21 1 - 33 U/L    AST (SGOT) 21 1 - 32 U/L    Alkaline Phosphatase 65 39 - 117 U/L    Total Bilirubin 0.3 0.0 - 1.2 mg/dL    Globulin 3.7 gm/dL    A/G Ratio 1.1 g/dL    BUN/Creatinine Ratio 15.5 7.0 - 25.0    Anion Gap 9.0 5.0 - 15.0 mmol/L    eGFR 116.7 >60.0 mL/min/1.73   Results for orders placed or performed in visit on 08/02/21   Liquid-based Pap Smear, Screening    Specimen: Cervix; ThinPrep Vial   Result Value Ref Range    Case Report       Gynecologic Cytology Report                       Case: [id number]                                  Authorizing Provider:  Lisa Ramos         Collected:           08/02/2021 03:13 PM                                 MD Kinga                                                                Ordering Location:     [facility]     Received:            08/03/2021 07:15 AM                                 [facility]                                                                 First Screen:          Daniel Cash                                                              Specimen:    Sendout to [facility], Cervix                                                                     Interpretation See attached report    Results for orders placed or performed during the hospital encounter of 06/17/21   PREVIOUS HISTORY    Specimen: Blood   Result Value Ref Range    Previous History Previous Record on File    Gold Top - SST   Result Value Ref Range    Extra Tube Hold for add-ons.    Urinalysis, Microscopic Only - Urine, Clean Catch    Specimen: Urine, Clean Catch   Result Value Ref Range    RBC, UA 0-2 (A) None Seen /HPF    WBC, UA 21-30 (A) None Seen, 0-2, 3-5 /HPF    Bacteria, UA 1+ (A) None Seen /HPF    Squamous Epithelial Cells, UA 3-5 (A) None Seen, 0-2 /HPF    Hyaline Casts, UA 13-20 None Seen /LPF    Methodology Manual Light Microscopy    Urinalysis With Microscopic If Indicated (No Culture) - Urine, Clean Catch    Specimen: Urine, Clean Catch    Result Value Ref Range    Color, UA Yellow Yellow, Straw, Dark Yellow, Joelle    Appearance, UA Cloudy (A) Clear    pH, UA 6.0 5.0 - 9.0    Specific Gravity, UA 1.022 1.003 - 1.030    Glucose, UA Negative Negative    Ketones, UA Negative Negative    Bilirubin, UA Negative Negative    Blood, UA Negative Negative    Protein, UA Negative Negative    Leuk Esterase, UA Small (1+) (A) Negative    Nitrite, UA Negative Negative    Urobilinogen, UA 0.2 E.U./dL 0.2 - 1.0 E.U./dL   Urine Drug Screen -    Specimen: Urine   Result Value Ref Range    THC, Screen, Urine Negative Negative    Phencyclidine (PCP), Urine Negative Negative    Cocaine Screen, Urine Negative Negative    Methamphetamine, Ur Negative Negative    Opiate Screen Negative Negative    Amphetamine Screen, Urine Negative Negative    Benzodiazepine Screen, Urine Negative Negative    Tricyclic Antidepressants Screen Negative Negative    Methadone Screen, Urine Negative Negative    Barbiturates Screen, Urine Negative Negative    Oxycodone Screen, Urine Negative Negative    Propoxyphene Screen Negative Negative    Buprenorphine, Screen, Urine Negative Negative     *Note: Due to a large number of results and/or encounters for the requested time period, some results have not been displayed. A complete set of results can be found in Results Review.         This document has been electronically signed by Yo Pinto MD on August 19, 2022 19:51 CDT

## 2022-08-25 NOTE — PROGRESS NOTES
Chief Complaint   Patient presents with   • Abdominal Pain       Subjective    Angélica Loretta Hooper is a 32 y.o. female.    History of Present Illness  Patient presented to GI clinic for follow-up visit today.  Feels better currently.  Abdominal pain and nausea have resolved with completion of H. pylori therapy.  Bowel movements regular.  Weight is stable.  CT abdomen pelvis was consistent with fatty infiltration.  EGD was consistent with esophagitis and gastritis.  Path was consistent with H. pylori gastritis.  Patient completed Prevpac.       The following portions of the patient's history were reviewed and updated as appropriate:   Past Medical History:   Diagnosis Date   • Depression    • Hyperlipidemia    • Palpitations      Past Surgical History:   Procedure Laterality Date   • ADENOIDECTOMY     • COLONOSCOPY N/A 2022    Procedure: COLONOSCOPY;  Surgeon: Yo Pinto MD;  Location: [facility] ENDOSCOPY;  Service: Gastroenterology;  Laterality: N/A;   • ENDOSCOPY N/A 2022    Procedure: ESOPHAGOGASTRODUODENOSCOPY;  Surgeon: Yo Pinto MD;  Location: [facility] ENDOSCOPY;  Service: Gastroenterology;  Laterality: N/A;   • TONSILLECTOMY     • WISDOM TOOTH EXTRACTION       Family History   Problem Relation Age of Onset   • Heart disease Mother    • Hypertension Father    • Idiopathic pulmonary fibrosis Father    • Hypertension Brother    • Heart attack Maternal Grandmother    • Bone cancer Maternal Grandfather    • Cirrhosis Maternal Grandfather    • Idiopathic pulmonary fibrosis Paternal Grandmother    • No Known Problems Son    • No Known Problems Son    • Stroke Other    • Diabetes Other    • Liver disease Other    • Crohn's disease Other      OB History        4    Para   3    Term   3            AB   1    Living   3       SAB   1    IAB        Ectopic        Molar        Multiple   0    Live Births   3              Prior to Admission medications    Medication Sig Start Date  End Date Taking? Authorizing Provider   atorvastatin (LIPITOR) 20 MG tablet Take 20 mg by mouth Daily. 6/14/22 6/15/23 Yes Alina Vivas MD   dicyclomine (BENTYL) 20 MG tablet Take 20 mg by mouth 4 (Four) Times a Day. 22  Yes ProviderAlina MD   norgestimate-ethinyl estradiol (ORTHO-CYCLEN) 0.25-35 MG-MCG per tablet Take 1 tablet by mouth Daily.   Yes Alina Vivas MD   omeprazole (priLOSEC) 40 MG capsule Take 1 capsule by mouth Daily. 22  Yes Yo Pinto MD   sertraline (ZOLOFT) 50 MG tablet Take 50 mg by mouth Daily. 22 Yes Alina Vivas MD   sucralfate (Carafate) 1 g tablet Take 1 tablet by mouth 4 (Four) Times a Day for 30 days. 22  Yo Pinto MD     Allergies   Allergen Reactions   • Amoxicillin Rash   • Penicillins Rash     Social History     Socioeconomic History   • Marital status:    Tobacco Use   • Smoking status: Former Smoker     Packs/day: 0.25     Years: 1.00     Pack years: 0.25     Quit date:      Years since quittin.6   • Smokeless tobacco: Never Used   Vaping Use   • Vaping Use: Never used   Substance and Sexual Activity   • Alcohol use: No   • Drug use: No   • Sexual activity: Yes     Partners: Male     Birth control/protection: Pill     Comment: LAST PAP SMEAR 2017 negative       Review of Systems  Review of Systems   Constitutional: Negative for chills, fatigue, fever and unexpected weight change.   HENT: Negative for congestion, ear discharge, hearing loss, nosebleeds and sore throat.    Eyes: Negative for pain, discharge and redness.   Respiratory: Negative for cough, chest tightness, shortness of breath and wheezing.    Cardiovascular: Negative for chest pain and palpitations.   Gastrointestinal: Negative for abdominal distention, abdominal pain, blood in stool, constipation, diarrhea, nausea and vomiting.   Endocrine: Negative for cold intolerance, polydipsia, polyphagia and polyuria.  "  Genitourinary: Negative for dysuria, flank pain, frequency, hematuria and urgency.   Musculoskeletal: Negative for arthralgias, back pain, joint swelling and myalgias.   Skin: Negative for color change, pallor and rash.   Neurological: Negative for tremors, seizures, syncope, weakness and headaches.   Hematological: Negative for adenopathy. Does not bruise/bleed easily.   Psychiatric/Behavioral: Negative for behavioral problems, confusion, dysphoric mood, hallucinations and suicidal ideas. The patient is not nervous/anxious.         /97 (BP Location: Left arm)   Pulse 81   Ht 177.8 cm (70\")   Wt 63.8 kg (140 lb 9.6 oz)   BMI 20.17 kg/m²     Objective    Physical Exam  Constitutional:       Appearance: She is well-developed.   HENT:      Head: Normocephalic and atraumatic.   Eyes:      Conjunctiva/sclera: Conjunctivae normal.      Pupils: Pupils are equal, round, and reactive to light.   Neck:      Thyroid: No thyromegaly.   Cardiovascular:      Rate and Rhythm: Normal rate and regular rhythm.      Heart sounds: Normal heart sounds. No murmur heard.  Pulmonary:      Effort: Pulmonary effort is normal.      Breath sounds: Normal breath sounds. No wheezing.   Abdominal:      General: Bowel sounds are normal. There is no distension.      Palpations: Abdomen is soft. There is no mass.      Tenderness: There is no abdominal tenderness.      Hernia: No hernia is present.   Genitourinary:     Comments: No lesions noted  Musculoskeletal:         General: No tenderness. Normal range of motion.      Cervical back: Normal range of motion and neck supple.   Lymphadenopathy:      Cervical: No cervical adenopathy.   Skin:     General: Skin is warm and dry.      Findings: No rash.   Neurological:      Mental Status: She is alert and oriented to person, place, and time.      Cranial Nerves: No cranial nerve deficit.   Psychiatric:         Thought Content: Thought content normal.       Admission on 06/30/2022, Discharged " on 2022   Component Date Value Ref Range Status   • Reference Lab Report 2022    Final                    Value:[facility]  [address] [place] [address]  Phone: [phone] or [phone]  Fax: [phone]  Chinedu Diaz M.D., Medical Director    PATIENT NAME                           LABORATORY NO.  SYDNEE MARTINEZ.                  [id number]  [id number]                         AGE              SEX  SSN           CLIENT REF #  [facility]           32      1990  F    [id number]   [id number]    [place]                       REQUESTING M.D.     ATTENDING M.D.     COPY TO.  [address] [place] [address]                 VINAYAK WALSH PEGGY  [address]  DATE COLLECTED      DATE RECEIVED      DATE REPORTED  2022    DIAGNOSIS:  A.   SMALL BOWEL, BIOPSY:  Normal small bowel architecture with increased intraepithelial  lymphocytes (modified Marsh type 1)  B.   ANTRUM, BIOPSY:  Active chronic gastritis  Positive for Helicobacter pylori on                           routine staining  C.   GE JUNCTION:  Inflamed gastric mucosa and benign squamous mucosa  Positive for Helicobacter pylori on routine staining  Negative for specialized Rod's mucosa  D.   TERMINAL ILEUM BIOPSY:  No significant histologic abnormality  E.   COLON, BIOPSY:  No significant histologic abnormality    JBS/pah    COMMENT:  The biopsy of small bowel (duodenum) shows increased  intraepithelial lymphocytes with neither crypt hyperplasia nor villous shortening.  Consider a mild form of celiac disease vs. Helicobacter associated duodenitis vs.  other cause of lymphocytic enteritis.    CLINICAL HISTORY:  Right upper quadrant abdominal pain, left upper quadrant abdominal pain,  Diarrhea of unspecified type, nausea    SPECIMENS RECEIVED:  A.  SMALL BOWEL, BIOPSY  B.  ANTRUM, BIOPSY  C.   "GE JUNCTION  D.  TERMINAL ILEUM BIOPSY  E.  COLON, BIOPSY    MICROSCOPIC DESCRIPTION:  Tissue blocks are prepared and slides are examined microscopically on all  specimens. See diagnosis for                           details.    Professional interpretation rendered by Gilbert Bella M.D. at [employer], [address] [place], [place] [id number].    GROSS DESCRIPTION:  A.  Specimen received in 1 formalin filled container labeled \"small bowel  biopsy\" consists of multiple pieces of tan-gray soft tissue measuring 1.1 x  0.3 x 0.2 cm in aggregate.  All submitted in 1 cassette.  BKO  B.  Specimen received in 1 formalin filled container labeled \"antrum biopsy\"  consists of 3 pieces of tan soft tissue measuring 0.9 x 0.3 x 0.2 centimeters  in aggregate.  All submitted in 1 cassette.  C.  Specimen received in 1 formalin filled container labeled \"EG junction  biopsy\" consists of 3 pieces of tan-gray soft tissue measuring 0.8 x 0.3 x  0.2 cm in aggregate.  All submitted in 1 cassette.  D.  Specimen received in 1 formalin filled container labeled \"terminal ileum  biopsy\" consists of multiple pieces of tan-gray soft tissue measuring 1.0 x  0.3 x 0.2 cm in aggregate.  All submitted in 1 cassette.  E.                            Specimen received in 1 formalin filled container labeled \"colonic mucosa  biopsy\" consists of 2 pieces of tan-gray soft tissue measuring 0.7 x 0.3 x  0.2 cm in aggregate.  All submitted in 1 cassette.  BKO    REVIEWED, DIAGNOSED AND ELECTRONICALLY  SIGNED BY:    Gilbert Bella M.D.  CPT CODES:  88305x5       Assessment & Plan    No diagnosis found..   1.  Bilateral upper quadrant pain with nausea, likely due to H. pylori gastritis and improved with Prevpac. Continue Prilosec 40 mg p.o. daily.  And Carafate 1 g p.o. 4 times daily.   2.  Abdominal pain with diarrhea, well controlled.  Continue Bentyl and high-fiber diet.    3.  Weight loss, continue p.o. nutritional supplements.    Orders placed " during this encounter include:  No orders of the defined types were placed in this encounter.      * Surgery not found *    Review and/or summary of lab tests, radiology, procedures, medications. Review and summary of old records and obtaining of history. The risks and benefits of my recommendations, as well as other treatment options were discussed with the patient today. Questions were answered.    No orders of the defined types were placed in this encounter.      Follow-up: Return in about 3 months (around 2022).               Results for orders placed or performed during the hospital encounter of 22   TISSUE EXAM, [facility] (SHRUTHI,COR,[facility]AD)    Specimen: A: Small Intestine, Duodenum; Tissue    B: Gastric, Antrum; Tissue    C: Esophagus; Tissue    D: Small Intestine, Ileum; Tissue    E: Large Intestine; Tissue   Result Value Ref Range    Reference Lab Report       [facility]  [address] [place] [address]  Phone: [phone] or [phone]  Fax: [phone]  Chinedu Diaz M.D., Medical Director    PATIENT NAME                           LABORATORY NO.  [address]  SYDNEE MILAN.                  [id number]  [id number]                         AGE              SEX  SSN           CLIENT REF #  [facility]           32      1990  F    [id number]   [id number]    [place]                       REQUESTING M.D.     ATTENDING M.D.     COPY TO.  [address] [place] [address]                 VINAYAK WALSH PEGGY  [address]  DATE COLLECTED      DATE RECEIVED      DATE REPORTED  2022    DIAGNOSIS:  A.   SMALL BOWEL, BIOPSY:  Normal small bowel architecture with increased intraepithelial  lymphocytes (modified Marsh type 1)  B.   ANTRUM, BIOPSY:  Active chronic gastritis  Positive for Helicobacter pylori on  routine staining  C.   GE JUNCTION:  Inflamed gastric mucosa  "and benign squamous mucosa  Positive for Helicobacter pylori on routine staining  Negative for specialized Rod's mucosa  D.   TERMINAL ILEUM BIOPSY:  No significant histologic abnormality  E.   COLON, BIOPSY:  No significant histologic abnormality    JBS/pah    COMMENT:  The biopsy of small bowel (duodenum) shows increased  intraepithelial lymphocytes with neither crypt hyperplasia nor villous shortening.  Consider a mild form of celiac disease vs. Helicobacter associated duodenitis vs.  other cause of lymphocytic enteritis.    CLINICAL HISTORY:  Right upper quadrant abdominal pain, left upper quadrant abdominal pain,  Diarrhea of unspecified type, nausea    SPECIMENS RECEIVED:  A.  SMALL BOWEL, BIOPSY  B.  ANTRUM, BIOPSY  C.  GE JUNCTION  D.  TERMINAL ILEUM BIOPSY  E.  COLON, BIOPSY    MICROSCOPIC DESCRIPTION:  Tissue blocks are prepared and slides are examined microscopically on all  specimens. See diagnosis for  details.    Professional interpretation rendered by Gilbert Bella M.D. at "[employer] [address] [place] [address].    GROSS DESCRIPTION:  A.  Specimen received in 1 formalin filled container labeled \"small bowel  biopsy\" consists of multiple pieces of tan-gray soft tissue measuring 1.1 x  0.3 x 0.2 cm in aggregate.  All submitted in 1 cassette.  BKO  B.  Specimen received in 1 formalin filled container labeled \"antrum biopsy\"  consists of 3 pieces of tan soft tissue measuring 0.9 x 0.3 x 0.2 centimeters  in aggregate.  All submitted in 1 cassette.  C.  Specimen received in 1 formalin filled container labeled \"EG junction  biopsy\" consists of 3 pieces of tan-gray soft tissue measuring 0.8 x 0.3 x  0.2 cm in aggregate.  All submitted in 1 cassette.  D.  Specimen received in 1 formalin filled container labeled \"terminal ileum  biopsy\" consists of multiple pieces of tan-gray soft tissue measuring 1.0 x  0.3 x 0.2 cm in aggregate.  All submitted in 1 cassette.  E.   Specimen " "received in 1 formalin filled container labeled \"colonic mucosa  biopsy\" consists of 2 pieces of tan-gray soft tissue measuring 0.7 x 0.3 x  0.2 cm in aggregate.  All submitted in 1 cassette.  BKO    REVIEWED, DIAGNOSED AND ELECTRONICALLY  SIGNED BY:    Gilbert Bella M.D.  CPT CODES:  88305x5     Results for orders placed or performed during the hospital encounter of 06/27/22   Green Top (Gel)   Result Value Ref Range    Extra Tube Hold for add-ons.    Urinalysis, Microscopic Only - Urine, Clean Catch    Specimen: Urine, Clean Catch   Result Value Ref Range    RBC, UA None Seen None Seen /HPF    WBC, UA 3-5 None Seen, 0-2, 3-5 /HPF    Bacteria, UA None Seen None Seen /HPF    Squamous Epithelial Cells, UA 0-2 None Seen, 0-2 /HPF    Hyaline Casts, UA 0-2 None Seen /LPF    Methodology Automated Microscopy    Urinalysis With Microscopic If Indicated (No Culture) - Urine, Clean Catch    Specimen: Urine, Clean Catch   Result Value Ref Range    Color, UA Yellow Yellow, Straw, Dark Yellow, Joelle    Appearance, UA Clear Clear    pH, UA 6.0 5.0 - 9.0    Specific Gravity, UA 1.007 1.003 - 1.030    Glucose, UA Negative Negative    Ketones, UA Negative Negative    Bilirubin, UA Negative Negative    Blood, UA Negative Negative    Protein, UA Negative Negative    Leuk Esterase, UA Trace (A) Negative    Nitrite, UA Negative Negative    Urobilinogen, UA 0.2 E.U./dL 0.2 - 1.0 E.U./dL   CBC Auto Differential    Specimen: Blood   Result Value Ref Range    WBC 4.06 3.40 - 10.80 10*3/mm3    RBC 4.58 3.77 - 5.28 10*6/mm3    Hemoglobin 13.9 12.0 - 15.9 g/dL    Hematocrit 39.7 34.0 - 46.6 %    MCV 86.7 79.0 - 97.0 fL    MCH 30.3 26.6 - 33.0 pg    MCHC 35.0 31.5 - 35.7 g/dL    RDW 12.8 12.3 - 15.4 %    RDW-SD 40.3 37.0 - 54.0 fl    MPV 9.8 6.0 - 12.0 fL    Platelets 233 140 - 450 10*3/mm3   Lavender Top   Result Value Ref Range    Extra Tube hold for add-on    Light Blue Top   Result Value Ref Range    Extra Tube Hold for add-ons.  "   Manual Differential    Specimen: Blood   Result Value Ref Range    Neutrophil % 23.0 (L) 42.7 - 76.0 %    Lymphocyte % 56.0 (H) 19.6 - 45.3 %    Monocyte % 19.0 (H) 5.0 - 12.0 %    Eosinophil % 1.0 0.3 - 6.2 %    Bands %  1.0 0.0 - 5.0 %    Neutrophils Absolute 0.97 (L) 1.70 - 7.00 10*3/mm3    Lymphocytes Absolute 2.27 0.70 - 3.10 10*3/mm3    Monocytes Absolute 0.77 0.10 - 0.90 10*3/mm3    Eosinophils Absolute 0.04 0.00 - 0.40 10*3/mm3    RBC Morphology Normal Normal    WBC Morphology Normal Normal    Platelet Estimate Adequate Normal   hCG, Serum, Qualitative    Specimen: Blood   Result Value Ref Range    HCG Qualitative Negative Negative   Lipase    Specimen: Blood   Result Value Ref Range    Lipase 27 13 - 60 U/L   Comprehensive Metabolic Panel    Specimen: Blood   Result Value Ref Range    Glucose 108 (H) 65 - 99 mg/dL    BUN 11 6 - 20 mg/dL    Creatinine 0.71 0.57 - 1.00 mg/dL    Sodium 137 136 - 145 mmol/L    Potassium 3.6 3.5 - 5.2 mmol/L    Chloride 104 98 - 107 mmol/L    CO2 24.0 22.0 - 29.0 mmol/L    Calcium 8.7 8.6 - 10.5 mg/dL    Total Protein 7.8 6.0 - 8.5 g/dL    Albumin 4.10 3.50 - 5.20 g/dL    ALT (SGPT) 21 1 - 33 U/L    AST (SGOT) 21 1 - 32 U/L    Alkaline Phosphatase 65 39 - 117 U/L    Total Bilirubin 0.3 0.0 - 1.2 mg/dL    Globulin 3.7 gm/dL    A/G Ratio 1.1 g/dL    BUN/Creatinine Ratio 15.5 7.0 - 25.0    Anion Gap 9.0 5.0 - 15.0 mmol/L    eGFR 116.7 >60.0 mL/min/1.73   Results for orders placed or performed in visit on 08/02/21   Liquid-based Pap Smear, Screening    Specimen: Cervix; ThinPrep Vial   Result Value Ref Range    Case Report       Gynecologic Cytology Report                       Case: [id number]                                  Authorizing Provider:  Lisa Ramos         Collected:           08/02/2021 03:13 PM                                 MD Kinga                                                                Ordering Location:     [facility]      Received:            08/03/2021 07:15 AM                                 [facility]                                                                 First Screen:          Daniel Cash                                                              Specimen:    Sendout to [facility], Cervix                                                                     Interpretation See attached report    Results for orders placed or performed during the hospital encounter of 06/17/21   PREVIOUS HISTORY    Specimen: Blood   Result Value Ref Range    Previous History Previous Record on File    Gold Top - SST   Result Value Ref Range    Extra Tube Hold for add-ons.    Urinalysis, Microscopic Only - Urine, Clean Catch    Specimen: Urine, Clean Catch   Result Value Ref Range    RBC, UA 0-2 (A) None Seen /HPF    WBC, UA 21-30 (A) None Seen, 0-2, 3-5 /HPF    Bacteria, UA 1+ (A) None Seen /HPF    Squamous Epithelial Cells, UA 3-5 (A) None Seen, 0-2 /HPF    Hyaline Casts, UA 13-20 None Seen /LPF    Methodology Manual Light Microscopy    Urinalysis With Microscopic If Indicated (No Culture) - Urine, Clean Catch    Specimen: Urine, Clean Catch   Result Value Ref Range    Color, UA Yellow Yellow, Straw, Dark Yellow, Joelle    Appearance, UA Cloudy (A) Clear    pH, UA 6.0 5.0 - 9.0    Specific Gravity, UA 1.022 1.003 - 1.030    Glucose, UA Negative Negative    Ketones, UA Negative Negative    Bilirubin, UA Negative Negative    Blood, UA Negative Negative    Protein, UA Negative Negative    Leuk Esterase, UA Small (1+) (A) Negative    Nitrite, UA Negative Negative    Urobilinogen, UA 0.2 E.U./dL 0.2 - 1.0 E.U./dL   Urine Drug Screen -    Specimen: Urine   Result Value Ref Range    THC, Screen, Urine Negative Negative    Phencyclidine (PCP), Urine Negative Negative    Cocaine Screen, Urine Negative Negative    Methamphetamine, Ur Negative Negative    Opiate Screen Negative Negative    Amphetamine Screen, Urine Negative Negative    Benzodiazepine  Screen, Urine Negative Negative    Tricyclic Antidepressants Screen Negative Negative    Methadone Screen, Urine Negative Negative    Barbiturates Screen, Urine Negative Negative    Oxycodone Screen, Urine Negative Negative    Propoxyphene Screen Negative Negative    Buprenorphine, Screen, Urine Negative Negative     *Note: Due to a large number of results and/or encounters for the requested time period, some results have not been displayed. A complete set of results can be found in Results Review.         This document has been electronically signed by Yo Pinto MD on August 25, 2022 06:33 CDT

## 2022-10-25 RX ORDER — SUCRALFATE 1 G/1
TABLET ORAL
Qty: 120 TABLET | Refills: 4 | Status: SHIPPED | OUTPATIENT
Start: 2022-10-25 | End: 2022-11-21

## 2022-11-01 ENCOUNTER — OFFICE VISIT (OUTPATIENT)
Dept: OBSTETRICS AND GYNECOLOGY | Facility: CLINIC | Age: 32
End: 2022-11-01

## 2022-11-01 VITALS
HEIGHT: 70 IN | WEIGHT: 146 LBS | BODY MASS INDEX: 20.9 KG/M2 | SYSTOLIC BLOOD PRESSURE: 136 MMHG | DIASTOLIC BLOOD PRESSURE: 72 MMHG

## 2022-11-01 DIAGNOSIS — N64.4 BREAST PAIN IN FEMALE: Primary | ICD-10-CM

## 2022-11-01 DIAGNOSIS — N76.0 ACUTE VAGINITIS: ICD-10-CM

## 2022-11-01 LAB
CANDIDA ALBICANS: NEGATIVE
GARDNERELLA VAGINALIS: NEGATIVE
T VAGINALIS DNA VAG QL PROBE+SIG AMP: NEGATIVE

## 2022-11-01 PROCEDURE — 99213 OFFICE O/P EST LOW 20 MIN: CPT | Performed by: NURSE PRACTITIONER

## 2022-11-01 PROCEDURE — 87510 GARDNER VAG DNA DIR PROBE: CPT | Performed by: NURSE PRACTITIONER

## 2022-11-01 PROCEDURE — 87480 CANDIDA DNA DIR PROBE: CPT | Performed by: NURSE PRACTITIONER

## 2022-11-01 PROCEDURE — 87660 TRICHOMONAS VAGIN DIR PROBE: CPT | Performed by: NURSE PRACTITIONER

## 2022-11-01 NOTE — PROGRESS NOTES
Subjective   Angélica Loretta Hooper is a 32 y.o. breast pain, possible vaginal infection      History of Present Illness  LMP: 10/25/2022  Pap: NIL, negative hrHPV 2021  BC: ANANTH    Pt presents with complaints bilateral breast pain and vaginal irritation.  Her breast pain started around two weeks ago.  The left breast pain is more apparent and bothersome than right.  Today her breast pain seems to be worse.  At times the breast pain is accompanied by a tingling feeling. She stopped breastfeeding around 14 months ago.  Her cousin whom is around 50 years old was recently diagnosed with breast cancer.  No breast/nipple skin color changes or discharge.  The vaginal irritation started recently and is accompanied by spotting, increased discharge, and itching.  Back in the spring she was diagnosed with bacterial vaginosis and treated.  No recent antibiotic or vaccines.    Breast Problem  This is a new problem. The current episode started 1 to 4 weeks ago. The problem occurs daily. The problem has been gradually worsening. Pertinent negatives include no abdominal pain, chest pain, chills, diaphoresis, fatigue, fever, headaches or rash. Nothing aggravates the symptoms. She has tried nothing for the symptoms.   Vaginitis  This is a new problem. The problem has been unchanged. Pertinent negatives include no abdominal pain, chest pain, chills, diaphoresis, fatigue, fever, headaches or rash. Nothing aggravates the symptoms. She has tried nothing for the symptoms.       The following portions of the patient's history were reviewed and updated as appropriate: allergies, current medications, past family history, past medical history, past social history, past surgical history and problem list.    Review of Systems   Constitutional: Negative for chills, diaphoresis, fatigue, fever and unexpected weight change.   Respiratory: Negative for apnea, chest tightness and shortness of breath.    Cardiovascular: Negative for chest pain and  palpitations.   Gastrointestinal: Negative for abdominal distention, abdominal pain, constipation and diarrhea.   Genitourinary: Positive for vaginal bleeding and vaginal discharge. Negative for decreased urine volume, difficulty urinating, dysuria, enuresis, flank pain, frequency, genital sores, hematuria, menstrual problem, pelvic pain, urgency and vaginal pain.   Skin: Negative for rash.   Neurological: Negative for headaches.   Psychiatric/Behavioral: Negative for sleep disturbance and suicidal ideas.         Objective   Physical Exam  Vitals and nursing note reviewed. Exam conducted with a chaperone present.   Constitutional:       General: She is awake. She is not in acute distress.     Appearance: Normal appearance. She is well-developed and well-groomed. She is not ill-appearing, toxic-appearing or diaphoretic.   Cardiovascular:      Rate and Rhythm: Normal rate and regular rhythm.      Heart sounds: Normal heart sounds.   Pulmonary:      Effort: Pulmonary effort is normal.      Breath sounds: Normal breath sounds.   Chest:   Breasts:     Curt Score is 5.      Breasts are symmetrical.      Right: Tenderness present. No swelling, bleeding, inverted nipple, mass, nipple discharge or skin change.      Left: Tenderness present. No swelling, bleeding, inverted nipple, mass, nipple discharge or skin change.       Genitourinary:     General: Normal vulva.      Exam position: Lithotomy position.      Labia:         Right: No rash, tenderness, lesion or injury.         Left: No rash, tenderness, lesion or injury.       Urethra: No prolapse, urethral pain, urethral swelling or urethral lesion.      Vagina: Vaginal discharge present.      Cervix: Normal.      Uterus: Normal.       Adnexa:         Right: No mass, tenderness or fullness.          Left: No mass, tenderness or fullness.        Comments: Vaginosis panel swab obtained  Lymphadenopathy:      Upper Body:      Right upper body: No supraclavicular, axillary or  pectoral adenopathy.      Left upper body: No supraclavicular, axillary or pectoral adenopathy.   Skin:     General: Skin is warm and dry.   Neurological:      Mental Status: She is alert and oriented to person, place, and time.   Psychiatric:         Attention and Perception: Attention and perception normal.         Mood and Affect: Mood and affect normal.         Speech: Speech normal.         Behavior: Behavior normal. Behavior is cooperative.           Assessment & Plan   Diagnoses and all orders for this visit:    1. Breast pain in female (Primary)  -     Mammo Diagnostic Bilateral With CAD; Future  -     US Breast Left Limited; Future    2. Acute vaginitis  -     Gardnerella vaginalis, Trichomonas vaginalis, Candida albicans, DNA - Swab, Vagina       Wear supportive bra.  Stay hydrated.  She does not consume caffeine or smoke.  Imaging ordered due to breast pain persistence.  Vaginosis panel obtained to r/o infection. Will follow up with results.

## 2022-11-02 ENCOUNTER — TELEPHONE (OUTPATIENT)
Dept: OBSTETRICS AND GYNECOLOGY | Facility: CLINIC | Age: 32
End: 2022-11-02

## 2022-11-21 ENCOUNTER — LAB (OUTPATIENT)
Dept: LAB | Facility: HOSPITAL | Age: 32
End: 2022-11-21

## 2022-11-21 ENCOUNTER — OFFICE VISIT (OUTPATIENT)
Dept: GASTROENTEROLOGY | Facility: CLINIC | Age: 32
End: 2022-11-21

## 2022-11-21 VITALS
DIASTOLIC BLOOD PRESSURE: 93 MMHG | HEART RATE: 81 BPM | WEIGHT: 144.4 LBS | HEIGHT: 70 IN | SYSTOLIC BLOOD PRESSURE: 155 MMHG | BODY MASS INDEX: 20.67 KG/M2

## 2022-11-21 DIAGNOSIS — R11.0 NAUSEA: ICD-10-CM

## 2022-11-21 DIAGNOSIS — R10.11 RIGHT UPPER QUADRANT ABDOMINAL PAIN: Primary | ICD-10-CM

## 2022-11-21 DIAGNOSIS — R10.11 RIGHT UPPER QUADRANT ABDOMINAL PAIN: ICD-10-CM

## 2022-11-21 PROCEDURE — 86003 ALLG SPEC IGE CRUDE XTRC EA: CPT

## 2022-11-21 PROCEDURE — 99214 OFFICE O/P EST MOD 30 MIN: CPT | Performed by: INTERNAL MEDICINE

## 2022-11-21 PROCEDURE — 36415 COLL VENOUS BLD VENIPUNCTURE: CPT

## 2022-11-21 PROCEDURE — 82785 ASSAY OF IGE: CPT

## 2022-11-21 PROCEDURE — 86008 ALLG SPEC IGE RECOMB EA: CPT

## 2022-11-21 RX ORDER — DICYCLOMINE HCL 20 MG
20 TABLET ORAL 4 TIMES DAILY
Qty: 120 TABLET | Refills: 3 | Status: SHIPPED | OUTPATIENT
Start: 2022-11-21 | End: 2022-12-21

## 2022-11-25 NOTE — PROGRESS NOTES
Chief Complaint   Patient presents with   • Follow-up     3 month        Subjective    Angélica Loretta Hooper is a 32 y.o. female.    History of Present Illness  Patient presented to GI clinic for follow-up visit today.  Feels better currently.  Had 2 episodes of nausea and vomiting with abdominal pain since last visit.  Denies diarrhea, constipation, rectal bleeding or melena.  Gained 4 pounds weight since last visit.  Completed H. pylori treatment.       The following portions of the patient's history were reviewed and updated as appropriate:   Past Medical History:   Diagnosis Date   • Depression    • Hyperlipidemia    • Palpitations      Past Surgical History:   Procedure Laterality Date   • ADENOIDECTOMY     • COLONOSCOPY N/A 2022    Procedure: COLONOSCOPY;  Surgeon: Yo Pinto MD;  Location: [facility] ENDOSCOPY;  Service: Gastroenterology;  Laterality: N/A;   • ENDOSCOPY N/A 2022    Procedure: ESOPHAGOGASTRODUODENOSCOPY;  Surgeon: Yo Pinto MD;  Location: [facility] ENDOSCOPY;  Service: Gastroenterology;  Laterality: N/A;   • TONSILLECTOMY     • WISDOM TOOTH EXTRACTION       Family History   Problem Relation Age of Onset   • Heart disease Mother    • Hypertension Father    • Idiopathic pulmonary fibrosis Father    • Hypertension Brother    • Heart attack Maternal Grandmother    • Bone cancer Maternal Grandfather    • Cirrhosis Maternal Grandfather    • Idiopathic pulmonary fibrosis Paternal Grandmother    • No Known Problems Son    • No Known Problems Son    • Stroke Other    • Diabetes Other    • Liver disease Other    • Crohn's disease Other      OB History        4    Para   3    Term   3            AB   1    Living   3       SAB   1    IAB        Ectopic        Molar        Multiple   0    Live Births   3              Prior to Admission medications    Medication Sig Start Date End Date Taking? Authorizing Provider   atorvastatin (LIPITOR) 20 MG tablet Take 20 mg by  mouth Daily. 6/14/22 6/15/23 Yes ProviderAlina MD   dicyclomine (BENTYL) 20 MG tablet Take 1 tablet by mouth 4 (Four) Times a Day for 30 days. 22 Yes Yo Pinto MD   norgestimate-ethinyl estradiol (ORTHO-CYCLEN) 0.25-35 MG-MCG per tablet Take 1 tablet by mouth Daily.   Yes ProviderAlina MD   omeprazole (priLOSEC) 40 MG capsule Take 1 capsule by mouth Daily. 22  Yes Yo Pinto MD   sertraline (ZOLOFT) 50 MG tablet Take 50 mg by mouth Daily. 22 Yes Alina Vivas MD     Allergies   Allergen Reactions   • Amoxicillin Rash   • Penicillins Rash     Social History     Socioeconomic History   • Marital status:    Tobacco Use   • Smoking status: Former     Packs/day: 0.25     Years: 1.00     Pack years: 0.25     Types: Cigarettes     Quit date:      Years since quittin.9   • Smokeless tobacco: Never   Vaping Use   • Vaping Use: Never used   Substance and Sexual Activity   • Alcohol use: No   • Drug use: No   • Sexual activity: Yes     Partners: Male     Birth control/protection: Pill     Comment: LAST PAP SMEAR 2017 negative       Review of Systems  Review of Systems   Constitutional: Negative for chills, fatigue, fever and unexpected weight change.   HENT: Negative for congestion, ear discharge, hearing loss, nosebleeds and sore throat.    Eyes: Negative for pain, discharge and redness.   Respiratory: Negative for cough, chest tightness, shortness of breath and wheezing.    Cardiovascular: Negative for chest pain and palpitations.   Gastrointestinal: Positive for abdominal pain, nausea and vomiting. Negative for abdominal distention, blood in stool, constipation and diarrhea.   Endocrine: Negative for cold intolerance, polydipsia, polyphagia and polyuria.   Genitourinary: Negative for dysuria, flank pain, frequency, hematuria and urgency.   Musculoskeletal: Negative for arthralgias, back pain, joint swelling and myalgias.   Skin:  "Negative for color change, pallor and rash.   Neurological: Negative for tremors, seizures, syncope, weakness and headaches.   Hematological: Negative for adenopathy. Does not bruise/bleed easily.   Psychiatric/Behavioral: Negative for behavioral problems, confusion, dysphoric mood, hallucinations and suicidal ideas. The patient is not nervous/anxious.         /93 (BP Location: Left arm, Patient Position: Sitting, Cuff Size: Adult)   Pulse 81   Ht 177.8 cm (70\")   Wt 65.5 kg (144 lb 6.4 oz)   LMP 10/25/2022 (Exact Date)   BMI 20.72 kg/m²     Objective    Physical Exam  Constitutional:       Appearance: She is well-developed.   HENT:      Head: Normocephalic and atraumatic.   Eyes:      Conjunctiva/sclera: Conjunctivae normal.      Pupils: Pupils are equal, round, and reactive to light.   Neck:      Thyroid: No thyromegaly.   Cardiovascular:      Rate and Rhythm: Normal rate and regular rhythm.      Heart sounds: Normal heart sounds. No murmur heard.  Pulmonary:      Effort: Pulmonary effort is normal.      Breath sounds: Normal breath sounds. No wheezing.   Abdominal:      General: Bowel sounds are normal. There is no distension.      Palpations: Abdomen is soft. There is no mass.      Tenderness: There is no abdominal tenderness.      Hernia: No hernia is present.   Genitourinary:     Comments: No lesions noted  Musculoskeletal:         General: No tenderness. Normal range of motion.      Cervical back: Normal range of motion and neck supple.   Lymphadenopathy:      Cervical: No cervical adenopathy.   Skin:     General: Skin is warm and dry.      Findings: No rash.   Neurological:      Mental Status: She is alert and oriented to person, place, and time.      Cranial Nerves: No cranial nerve deficit.   Psychiatric:         Thought Content: Thought content normal.       Admission on 11/05/2022, Discharged on 11/05/2022   Component Date Value Ref Range Status   • Rapid Influenza A Ag 11/05/2022 Positive (A) "   Final   • Rapid Influenza B Ag 11/05/2022 Negative   Final   • Internal Control 11/05/2022 Passed   Final   • Lot Number 11/05/2022 [id number]   Final   • Expiration Date 11/05/2022 10/23/2023   Final     Assessment & Plan      1. Right upper quadrant abdominal pain    2. Nausea    1.  Bilateral upper quadrant pain with nausea persisted anticoagulated and rule out gastritis, alpha gal allergy and pancreatico biliary pathology.  Obtain HIDA scan.  Obtain alpha gal panel.  Continue PPI.  Add Carafate.  2.  Abdominal pain with diarrhea, well controlled.  Continue Bentyl and high-fiber diet.  3.  Weight loss, improving.  Continue p.o. nutritional supplements.      Orders placed during this encounter include:  Orders Placed This Encounter   Procedures   • NM Hepatobiliary Without CCK     Standing Status:   Future     Standing Expiration Date:   11/21/2023     Order Specific Question:   Patient Pregnant     Answer:   No     Order Specific Question:   Do you want ejection fraction for this procedure?     Answer:   Yes   • Alpha-Gal IgE Panel     Standing Status:   Future     Number of Occurrences:   1     Standing Expiration Date:   11/21/2023     Order Specific Question:   Release to patient     Answer:   Routine Release       * Surgery not found *    Review and/or summary of lab tests, radiology, procedures, medications. Review and summary of old records and obtaining of history. The risks and benefits of my recommendations, as well as other treatment options were discussed with the patient today. Questions were answered.    New Medications Ordered This Visit   Medications   • dicyclomine (BENTYL) 20 MG tablet     Sig: Take 1 tablet by mouth 4 (Four) Times a Day for 30 days.     Dispense:  120 tablet     Refill:  3       Follow-up: Return in about 1 month (around 12/21/2022).               Results for orders placed or performed during the hospital encounter of 11/05/22   POC Influenza A/B    Specimen: Swab   Result Value  Ref Range    Rapid Influenza A Ag Positive (A)     Rapid Influenza B Ag Negative     Internal Control Passed     Lot Number [id number]     Expiration Date 10/23/2023    Results for orders placed or performed in visit on 22   Gardnerella vaginalis, Trichomonas vaginalis, Candida albicans, DNA - Swab, Vagina    Specimen: Vagina; Swab   Result Value Ref Range    CANDIDA ALBICANS Negative Negative    GARDNERELLA VAGINALIS Negative Negative    TRICHOMONAS VAGINALIS Negative Negative   Results for orders placed or performed during the hospital encounter of 22   TISSUE EXAM, [facility] ([facility])    Specimen: A: Small Intestine, Duodenum; Tissue    B: Gastric, Antrum; Tissue    C: Esophagus; Tissue    D: Small Intestine, Ileum; Tissue    E: Large Intestine; Tissue   Result Value Ref Range    Reference Lab Report       [facility]  [address] [place] [address]  Phone: [phone] or [phone]  Fax: [phone]  Chinedu Diaz M.D., Medical Director    PATIENT NAME                           LABORATORY NO.  SYDNEE MARTINEZ.                  [id number]  [id number]                         AGE              SEX  SSN           CLIENT REF #  [facility]           32      1990  F    [id number]   [id number]    [place]                       REQUESTING M.D.     ATTENDING M.D.     COPY TO[address]                 VINAYAK WALSH [address]  DATE COLLECTED      DATE RECEIVED      DATE REPORTED  2022    DIAGNOSIS:  A.   SMALL BOWEL, BIOPSY:  Normal small bowel architecture with increased intraepithelial  lymphocytes (modified Marsh type 1)  B.   ANTRUM, BIOPSY:  Active chronic gastritis  Positive for Helicobacter pylori on  routine staining  C.   GE JUNCTION:  Inflamed gastric mucosa and benign squamous mucosa  Positive for Helicobacter  "pylori on routine staining  Negative for specialized Rod's mucosa  D.   TERMINAL ILEUM BIOPSY:  No significant histologic abnormality  E.   COLON, BIOPSY:  No significant histologic abnormality    JBS/pah    COMMENT:  The biopsy of small bowel (duodenum) shows increased  intraepithelial lymphocytes with neither crypt hyperplasia nor villous shortening.  Consider a mild form of celiac disease vs. Helicobacter associated duodenitis vs.  other cause of lymphocytic enteritis.    CLINICAL HISTORY:  Right upper quadrant abdominal pain, left upper quadrant abdominal pain,  Diarrhea of unspecified type, nausea    SPECIMENS RECEIVED:  A.  SMALL BOWEL, BIOPSY  B.  ANTRUM, BIOPSY  C.  GE JUNCTION  D.  TERMINAL ILEUM BIOPSY  E.  COLON, BIOPSY    MICROSCOPIC DESCRIPTION:  Tissue blocks are prepared and slides are examined microscopically on all  specimens. See diagnosis for  details.    Professional interpretation rendered by Gilbert Bella M.D. at [facility] ([facility] [address] [place] [address].    GROSS DESCRIPTION:  A.  Specimen received in 1 formalin filled container labeled \"small bowel  biopsy\" consists of multiple pieces of tan-gray soft tissue measuring 1.1 x  0.3 x 0.2 cm in aggregate.  All submitted in 1 cassette.  BKO  B.  Specimen received in 1 formalin filled container labeled \"antrum biopsy\"  consists of 3 pieces of tan soft tissue measuring 0.9 x 0.3 x 0.2 centimeters  in aggregate.  All submitted in 1 cassette.  C.  Specimen received in 1 formalin filled container labeled \"EG junction  biopsy\" consists of 3 pieces of tan-gray soft tissue measuring 0.8 x 0.3 x  0.2 cm in aggregate.  All submitted in 1 cassette.  D.  Specimen received in 1 formalin filled container labeled \"terminal ileum  biopsy\" consists of multiple pieces of tan-gray soft tissue measuring 1.0 x  0.3 x 0.2 cm in aggregate.  All submitted in 1 cassette.  E.   Specimen received in 1 formalin filled container labeled \"colonic " "mucosa  biopsy\" consists of 2 pieces of tan-gray soft tissue measuring 0.7 x 0.3 x  0.2 cm in aggregate.  All submitted in 1 cassette.  BKO    REVIEWED, DIAGNOSED AND ELECTRONICALLY  SIGNED BY:    Gilbert Bella M.D.  CPT CODES:  88305x5     Results for orders placed or performed during the hospital encounter of 06/27/22   Green Top (Gel)   Result Value Ref Range    Extra Tube Hold for add-ons.    Urinalysis, Microscopic Only - Urine, Clean Catch    Specimen: Urine, Clean Catch   Result Value Ref Range    RBC, UA None Seen None Seen /HPF    WBC, UA 3-5 None Seen, 0-2, 3-5 /HPF    Bacteria, UA None Seen None Seen /HPF    Squamous Epithelial Cells, UA 0-2 None Seen, 0-2 /HPF    Hyaline Casts, UA 0-2 None Seen /LPF    Methodology Automated Microscopy    Urinalysis With Microscopic If Indicated (No Culture) - Urine, Clean Catch    Specimen: Urine, Clean Catch   Result Value Ref Range    Color, UA Yellow Yellow, Straw, Dark Yellow, Joelle    Appearance, UA Clear Clear    pH, UA 6.0 5.0 - 9.0    Specific Gravity, UA 1.007 1.003 - 1.030    Glucose, UA Negative Negative    Ketones, UA Negative Negative    Bilirubin, UA Negative Negative    Blood, UA Negative Negative    Protein, UA Negative Negative    Leuk Esterase, UA Trace (A) Negative    Nitrite, UA Negative Negative    Urobilinogen, UA 0.2 E.U./dL 0.2 - 1.0 E.U./dL   CBC Auto Differential    Specimen: Blood   Result Value Ref Range    WBC 4.06 3.40 - 10.80 10*3/mm3    RBC 4.58 3.77 - 5.28 10*6/mm3    Hemoglobin 13.9 12.0 - 15.9 g/dL    Hematocrit 39.7 34.0 - 46.6 %    MCV 86.7 79.0 - 97.0 fL    MCH 30.3 26.6 - 33.0 pg    MCHC 35.0 31.5 - 35.7 g/dL    RDW 12.8 12.3 - 15.4 %    RDW-SD 40.3 37.0 - 54.0 fl    MPV 9.8 6.0 - 12.0 fL    Platelets 233 140 - 450 10*3/mm3   Lavender Top   Result Value Ref Range    Extra Tube hold for add-on    Light Blue Top   Result Value Ref Range    Extra Tube Hold for add-ons.    Manual Differential    Specimen: Blood   Result Value Ref " Range    Neutrophil % 23.0 (L) 42.7 - 76.0 %    Lymphocyte % 56.0 (H) 19.6 - 45.3 %    Monocyte % 19.0 (H) 5.0 - 12.0 %    Eosinophil % 1.0 0.3 - 6.2 %    Bands %  1.0 0.0 - 5.0 %    Neutrophils Absolute 0.97 (L) 1.70 - 7.00 10*3/mm3    Lymphocytes Absolute 2.27 0.70 - 3.10 10*3/mm3    Monocytes Absolute 0.77 0.10 - 0.90 10*3/mm3    Eosinophils Absolute 0.04 0.00 - 0.40 10*3/mm3    RBC Morphology Normal Normal    WBC Morphology Normal Normal    Platelet Estimate Adequate Normal   hCG, Serum, Qualitative    Specimen: Blood   Result Value Ref Range    HCG Qualitative Negative Negative   Lipase    Specimen: Blood   Result Value Ref Range    Lipase 27 13 - 60 U/L   Comprehensive Metabolic Panel    Specimen: Blood   Result Value Ref Range    Glucose 108 (H) 65 - 99 mg/dL    BUN 11 6 - 20 mg/dL    Creatinine 0.71 0.57 - 1.00 mg/dL    Sodium 137 136 - 145 mmol/L    Potassium 3.6 3.5 - 5.2 mmol/L    Chloride 104 98 - 107 mmol/L    CO2 24.0 22.0 - 29.0 mmol/L    Calcium 8.7 8.6 - 10.5 mg/dL    Total Protein 7.8 6.0 - 8.5 g/dL    Albumin 4.10 3.50 - 5.20 g/dL    ALT (SGPT) 21 1 - 33 U/L    AST (SGOT) 21 1 - 32 U/L    Alkaline Phosphatase 65 39 - 117 U/L    Total Bilirubin 0.3 0.0 - 1.2 mg/dL    Globulin 3.7 gm/dL    A/G Ratio 1.1 g/dL    BUN/Creatinine Ratio 15.5 7.0 - 25.0    Anion Gap 9.0 5.0 - 15.0 mmol/L    eGFR 116.7 >60.0 mL/min/1.73   Results for orders placed or performed in visit on 08/02/21   Liquid-based Pap Smear, Screening    Specimen: Cervix; ThinPrep Vial   Result Value Ref Range    Case Report       Gynecologic Cytology Report                       Case: [id number]                                  Authorizing Provider:  Lisa Ramos         Collected:           08/02/2021 03:13 PM                                 MD Kinga                                                                Ordering Location:     [facility]:            08/03/2021 07:15 AM                                  [facility]                                                                 First Screen:          Daniel Cash                                                              Specimen:    Sendout to [facility], Cervix                                                                     Interpretation See attached report    Results for orders placed or performed during the hospital encounter of 06/17/21   PREVIOUS HISTORY    Specimen: Blood   Result Value Ref Range    Previous History Previous Record on File    Gold Top - SST   Result Value Ref Range    Extra Tube Hold for add-ons.    Urinalysis, Microscopic Only - Urine, Clean Catch    Specimen: Urine, Clean Catch   Result Value Ref Range    RBC, UA 0-2 (A) None Seen /HPF    WBC, UA 21-30 (A) None Seen, 0-2, 3-5 /HPF    Bacteria, UA 1+ (A) None Seen /HPF    Squamous Epithelial Cells, UA 3-5 (A) None Seen, 0-2 /HPF    Hyaline Casts, UA 13-20 None Seen /LPF    Methodology Manual Light Microscopy    Urinalysis With Microscopic If Indicated (No Culture) - Urine, Clean Catch    Specimen: Urine, Clean Catch   Result Value Ref Range    Color, UA Yellow Yellow, Straw, Dark Yellow, Joelle    Appearance, UA Cloudy (A) Clear    pH, UA 6.0 5.0 - 9.0    Specific Gravity, UA 1.022 1.003 - 1.030    Glucose, UA Negative Negative    Ketones, UA Negative Negative    Bilirubin, UA Negative Negative    Blood, UA Negative Negative    Protein, UA Negative Negative    Leuk Esterase, UA Small (1+) (A) Negative    Nitrite, UA Negative Negative    Urobilinogen, UA 0.2 E.U./dL 0.2 - 1.0 E.U./dL   Urine Drug Screen -    Specimen: Urine   Result Value Ref Range    THC, Screen, Urine Negative Negative    Phencyclidine (PCP), Urine Negative Negative    Cocaine Screen, Urine Negative Negative    Methamphetamine, Ur Negative Negative    Opiate Screen Negative Negative    Amphetamine Screen, Urine Negative Negative    Benzodiazepine Screen, Urine Negative Negative    Tricyclic Antidepressants Screen  Negative Negative    Methadone Screen, Urine Negative Negative    Barbiturates Screen, Urine Negative Negative    Oxycodone Screen, Urine Negative Negative    Propoxyphene Screen Negative Negative    Buprenorphine, Screen, Urine Negative Negative     *Note: Due to a large number of results and/or encounters for the requested time period, some results have not been displayed. A complete set of results can be found in Results Review.         This document has been electronically signed by Yo Pinto MD on November 25, 2022 10:02 CST

## 2022-11-28 LAB
ALPHA-GAL IGE QN: <0.1 KU/L
BEEF IGE QN: <0.1 KU/L
CONV CLASS DESCRIPTION: NORMAL
IGE SERPL-ACNC: 150 IU/ML (ref 6–495)
LAMB IGE QN: <0.1 KU/L
PORK IGE QN: <0.1 KU/L

## 2022-12-01 ENCOUNTER — HOSPITAL ENCOUNTER (OUTPATIENT)
Dept: NUCLEAR MEDICINE | Facility: HOSPITAL | Age: 32
Discharge: HOME | End: 2022-12-01

## 2022-12-01 DIAGNOSIS — R11.0 NAUSEA: ICD-10-CM

## 2022-12-01 DIAGNOSIS — R10.11 RIGHT UPPER QUADRANT ABDOMINAL PAIN: ICD-10-CM

## 2022-12-01 PROCEDURE — 78226 HEPATOBILIARY SYSTEM IMAGING: CPT

## 2022-12-01 PROCEDURE — A9537 TC99M MEBROFENIN: HCPCS | Performed by: INTERNAL MEDICINE

## 2022-12-01 PROCEDURE — 0 TECHNETIUM TC 99M MEBROFENIN KIT: Performed by: INTERNAL MEDICINE

## 2022-12-01 RX ORDER — KIT FOR THE PREPARATION OF TECHNETIUM TC 99M MEBROFENIN 45 MG/10ML
1 INJECTION, POWDER, LYOPHILIZED, FOR SOLUTION INTRAVENOUS
Status: COMPLETED | OUTPATIENT
Start: 2022-12-01 | End: 2022-12-01

## 2022-12-01 RX ADMIN — MEBROFENIN 1 DOSE: 45 INJECTION, POWDER, LYOPHILIZED, FOR SOLUTION INTRAVENOUS at 12:55

## 2022-12-06 ENCOUNTER — OFFICE VISIT (OUTPATIENT)
Dept: SURGERY | Facility: CLINIC | Age: 32
End: 2022-12-06

## 2022-12-06 VITALS
OXYGEN SATURATION: 99 % | BODY MASS INDEX: 21.88 KG/M2 | WEIGHT: 152.8 LBS | DIASTOLIC BLOOD PRESSURE: 88 MMHG | SYSTOLIC BLOOD PRESSURE: 122 MMHG | HEART RATE: 76 BPM | HEIGHT: 70 IN

## 2022-12-06 DIAGNOSIS — R10.11 RIGHT UPPER QUADRANT ABDOMINAL PAIN: Primary | ICD-10-CM

## 2022-12-06 PROCEDURE — 99203 OFFICE O/P NEW LOW 30 MIN: CPT | Performed by: SURGERY

## 2022-12-06 RX ORDER — SODIUM CHLORIDE 9 MG/ML
40 INJECTION, SOLUTION INTRAVENOUS AS NEEDED
Status: CANCELLED | OUTPATIENT
Start: 2022-12-19

## 2022-12-06 RX ORDER — SODIUM CHLORIDE, SODIUM LACTATE, POTASSIUM CHLORIDE, CALCIUM CHLORIDE 600; 310; 30; 20 MG/100ML; MG/100ML; MG/100ML; MG/100ML
100 INJECTION, SOLUTION INTRAVENOUS CONTINUOUS
Status: CANCELLED | OUTPATIENT
Start: 2022-12-19

## 2022-12-06 RX ORDER — SODIUM CHLORIDE 0.9 % (FLUSH) 0.9 %
10 SYRINGE (ML) INJECTION AS NEEDED
Status: CANCELLED | OUTPATIENT
Start: 2022-12-19

## 2022-12-06 RX ORDER — SODIUM CHLORIDE 0.9 % (FLUSH) 0.9 %
10 SYRINGE (ML) INJECTION EVERY 12 HOURS SCHEDULED
Status: CANCELLED | OUTPATIENT
Start: 2022-12-19

## 2022-12-06 NOTE — H&P (VIEW-ONLY)
Chief Complaint   Patient presents with   • Abdominal Pain     Right upper quadrant pain , Hida Scan, ref Satoor          HPI  32 year old with bilateral upper abdominal pain. Worse on the right. Negative US. Positive HIDA at 11%. Not related to any particular food. Vomiting once and nausea.    Past Medical History:   Diagnosis Date   • Depression    • Hyperlipidemia    • Palpitations        Past Surgical History:   Procedure Laterality Date   • ADENOIDECTOMY  1998   • COLONOSCOPY N/A 6/30/2022    Procedure: COLONOSCOPY;  Surgeon: Yo Pinto MD;  Location: [facility] ENDOSCOPY;  Service: Gastroenterology;  Laterality: N/A;   • ENDOSCOPY N/A 6/30/2022    Procedure: ESOPHAGOGASTRODUODENOSCOPY;  Surgeon: Yo Pinto MD;  Location: [facility] ENDOSCOPY;  Service: Gastroenterology;  Laterality: N/A;   • TONSILLECTOMY  1998   • WISDOM TOOTH EXTRACTION           Current Outpatient Medications:   •  atorvastatin (LIPITOR) 20 MG tablet, Take 20 mg by mouth Daily., Disp: , Rfl:   •  norgestimate-ethinyl estradiol (ORTHO-CYCLEN) 0.25-35 MG-MCG per tablet, Take 1 tablet by mouth Daily., Disp: , Rfl:   •  omeprazole (priLOSEC) 40 MG capsule, Take 1 capsule by mouth Daily., Disp: 30 capsule, Rfl: 11  •  sertraline (ZOLOFT) 50 MG tablet, Take 50 mg by mouth Daily., Disp: , Rfl:   •  dicyclomine (BENTYL) 20 MG tablet, Take 1 tablet by mouth 4 (Four) Times a Day for 30 days., Disp: 120 tablet, Rfl: 3    Allergies   Allergen Reactions   • Amoxicillin Rash   • Penicillins Rash       Family History   Problem Relation Age of Onset   • Heart disease Mother    • Hypertension Father    • Idiopathic pulmonary fibrosis Father    • Hypertension Brother    • Heart attack Maternal Grandmother    • Bone cancer Maternal Grandfather    • Cirrhosis Maternal Grandfather    • Idiopathic pulmonary fibrosis Paternal Grandmother    • No Known Problems Son    • No Known Problems Son    • Stroke Other    • Diabetes Other    • Liver disease Other     • Crohn's disease Other        Social History     Socioeconomic History   • Marital status:    Tobacco Use   • Smoking status: Former     Packs/day: 0.25     Years: 1.00     Pack years: 0.25     Types: Cigarettes     Quit date:      Years since quittin.9   • Smokeless tobacco: Never   Vaping Use   • Vaping Use: Never used   Substance and Sexual Activity   • Alcohol use: No   • Drug use: No   • Sexual activity: Yes     Partners: Male     Birth control/protection: Pill     Comment: LAST PAP SMEAR 2017 negative       Review of Systems   Constitutional: Negative.    HENT: Negative.    Eyes: Negative.    Respiratory: Negative.    Cardiovascular: Negative.    Gastrointestinal: Positive for abdominal pain, nausea and vomiting.   Endocrine: Negative.    Genitourinary: Negative.    Musculoskeletal: Negative.    Skin: Negative.    Allergic/Immunologic: Negative.    Neurological: Negative.    Hematological: Negative.    Psychiatric/Behavioral: Negative.        Physical Exam  Vitals reviewed.   Constitutional:       Appearance: Normal appearance.   HENT:      Head: Normocephalic and atraumatic.   Eyes:      Extraocular Movements: Extraocular movements intact.      Pupils: Pupils are equal, round, and reactive to light.   Cardiovascular:      Rate and Rhythm: Normal rate and regular rhythm.   Pulmonary:      Effort: Pulmonary effort is normal. No respiratory distress.   Abdominal:      General: Abdomen is flat. There is no distension.      Palpations: Abdomen is soft. There is no mass.      Tenderness: There is no abdominal tenderness. There is no guarding or rebound.      Hernia: No hernia is present.   Musculoskeletal:         General: Normal range of motion.      Cervical back: Normal range of motion and neck supple.   Skin:     General: Skin is warm and dry.      Coloration: Skin is not jaundiced.   Neurological:      General: No focal deficit present.      Mental Status: She is alert and oriented to  person, place, and time.   Psychiatric:         Mood and Affect: Mood normal.         Behavior: Behavior normal.         Thought Content: Thought content normal.         Judgment: Judgment normal.           ASSESSMENT    Diagnoses and all orders for this visit:    1. Right upper quadrant abdominal pain (Primary)  -     Comprehensive Metabolic Panel; Future  -     lactated ringers infusion  -     sodium chloride 0.9 % flush 10 mL  -     sodium chloride 0.9 % flush 10 mL  -     sodium chloride 0.9 % infusion 40 mL  -     Case Request; Standing    Other orders  -     Follow Anesthesia Guidelines / Protocol; Future  -     Provide NPO Instructions to Patient; Future  -     Chlorhexidine Skin Prep; Future  -     Follow Anesthesia Guidelines / Protocol; Standing  -     Insert Peripheral IV; Standing  -     Saline Lock & Maintain IV Access; Standing  -     Verify NPO Status; Standing  -     Obtain Informed Consent; Standing  -     SCD (Sequential Compression Device) - Place on Patient in Pre-Op; Standing  -     Verify / Perform Chlorhexidine Skin Prep; Standing        PLAN    1.Laparoscopic possible open cholecystectomy and cholangiogram    The following were discussed with the patient/family:    What are the indications that have led your doctor to the opinion that an operation is necessary?    * Symptoms and studies indicate that there is gallbladder disease causing pain in the abdomen.    What, if any, alternative treatments are available for your condition?    * Watching and waiting with no surgery  * Removing the gallbladder through one large incision made in the abdomen.    What will be the likely result if you don't have the operation?    * Pain, infection, inflammation, and/or stones may continue or get worse    What are the basic procedures involved in the operation?    * The surgery may be done laparoscopically. Laparoscopic surgery is done using a scope and hollow tube(s) called ports. These are inserted through  small cuts in the abdomen. A scope is a thin, lighted instrument with a camera attached. Tools are passed through the ports. Carbon dioxide gas is pumped into the abdomen to create workspace.   If the surgery cannot be performed with a scope, it may be done through a larger cut. This occurs 2% of the time.  The gallbladder will be removed after it is  from the liver, bile duct, and surrounding arteries. An x-ray of the bile ducts is usually performed with contrast dye injected into the ducts.  If stones are found, another procedure may be required to remove them.  A drain may rarely be inserted to keep fluid from building up in the treatment area.     What are the risks?    * Exposure to radiation. Pregnant women and women of childbearing age should talk with their doctor about this.  * Pain, numbness, swelling, weakness or scarring where tissue is cut.  * The gas used in laparoscopic procedures to inflate the abdomen can become trapped in tissues. Gas in the bloodstream can dangerously affect blood flow and  heart function.  * The procedure may not cure or relieve your condition or symptoms. They may come back and even worsen.  * You may need additional tests or treatment.  * Bleeding. You may need blood transfusions or other treatments. This may be discovered during the procedure, or later.  * Embolism. An embolism is an object that moves through your body in your bloodstream. It can be an air bubble, a blood clot, a piece of fat or other material. It can block a blood vessel. This can lead to stroke, pulmonary embolism (blockage of the main artery of the lung), or injury to organs or extremities.  * Reactions to dye used for imaging. These may include hives, swelling of the face and/or throat, difficulty breathing, and kidney failure.  * Retained stones in bile ducts.  * Wound infection, poor healing or reopening. Blood or clear fluid can also collect at the wound site(s).  * Damage to the bile ducts or  nearby structures. This may be discovered during the procedure, or later.  * Incisional hernia. Weak scar tissue may allow tissue to bulge through the cut.  * The instrument(s) placed in your abdomen can cause injuries to nearby structures.  * Death.    How is the operation expected to improve your health or quality of life?    * Operation is expected to decrease pain and nausea/vomiting associated with gallstones.  * This procedure may relieve or prevent infection, inflammation, and/or pain from stones or blockage of bile ducts.    Is hospitalization necessary and, if so, how long can you expect to be hospitalized?    * Most often, the operation is performed on an outpatient basis. Occasionally hospitalization is necessary for pain or nausea control    What can you expect during your recovery period?    * The gas used in laparoscopic procedures to inflate the abdomen can become trapped in tissues. Shoulder pain may occur for a few days after surgery.   * Nausea and pain control are obtained with oral medication.  * If you are on metformin, it will need to be held for 48 hours after surgery    When can you expect to resume normal activities?    * Normal activity can be resumed in 10-14 days if the procedure is performed laparoscopically. Open operations require no lifting or straining for 6 weeks.     Are there likely to be residual effects from the operation?    * Diarrhea often occurs, mostly temporary. Occasionally there is still minor food intolerance.    All questions were answered. The patient agrees to operation.                    This document has been electronically signed by Chan Soto MD on December 6, 2022 13:44 CST

## 2022-12-06 NOTE — PROGRESS NOTES
Chief Complaint   Patient presents with   • Abdominal Pain     Right upper quadrant pain , Hida Scan, ref Satoor          HPI  32 year old with bilateral upper abdominal pain. Worse on the right. Negative US. Positive HIDA at 11%. Not related to any particular food. Vomiting once and nausea.    Past Medical History:   Diagnosis Date   • Depression    • Hyperlipidemia    • Palpitations        Past Surgical History:   Procedure Laterality Date   • ADENOIDECTOMY  1998   • COLONOSCOPY N/A 6/30/2022    Procedure: COLONOSCOPY;  Surgeon: Yo Pinto MD;  Location: [facility] ENDOSCOPY;  Service: Gastroenterology;  Laterality: N/A;   • ENDOSCOPY N/A 6/30/2022    Procedure: ESOPHAGOGASTRODUODENOSCOPY;  Surgeon: Yo Pinto MD;  Location: [facility] ENDOSCOPY;  Service: Gastroenterology;  Laterality: N/A;   • TONSILLECTOMY  1998   • WISDOM TOOTH EXTRACTION           Current Outpatient Medications:   •  atorvastatin (LIPITOR) 20 MG tablet, Take 20 mg by mouth Daily., Disp: , Rfl:   •  norgestimate-ethinyl estradiol (ORTHO-CYCLEN) 0.25-35 MG-MCG per tablet, Take 1 tablet by mouth Daily., Disp: , Rfl:   •  omeprazole (priLOSEC) 40 MG capsule, Take 1 capsule by mouth Daily., Disp: 30 capsule, Rfl: 11  •  sertraline (ZOLOFT) 50 MG tablet, Take 50 mg by mouth Daily., Disp: , Rfl:   •  dicyclomine (BENTYL) 20 MG tablet, Take 1 tablet by mouth 4 (Four) Times a Day for 30 days., Disp: 120 tablet, Rfl: 3    Allergies   Allergen Reactions   • Amoxicillin Rash   • Penicillins Rash       Family History   Problem Relation Age of Onset   • Heart disease Mother    • Hypertension Father    • Idiopathic pulmonary fibrosis Father    • Hypertension Brother    • Heart attack Maternal Grandmother    • Bone cancer Maternal Grandfather    • Cirrhosis Maternal Grandfather    • Idiopathic pulmonary fibrosis Paternal Grandmother    • No Known Problems Son    • No Known Problems Son    • Stroke Other    • Diabetes Other    • Liver disease Other     • Crohn's disease Other        Social History     Socioeconomic History   • Marital status:    Tobacco Use   • Smoking status: Former     Packs/day: 0.25     Years: 1.00     Pack years: 0.25     Types: Cigarettes     Quit date:      Years since quittin.9   • Smokeless tobacco: Never   Vaping Use   • Vaping Use: Never used   Substance and Sexual Activity   • Alcohol use: No   • Drug use: No   • Sexual activity: Yes     Partners: Male     Birth control/protection: Pill     Comment: LAST PAP SMEAR 2017 negative       Review of Systems   Constitutional: Negative.    HENT: Negative.    Eyes: Negative.    Respiratory: Negative.    Cardiovascular: Negative.    Gastrointestinal: Positive for abdominal pain, nausea and vomiting.   Endocrine: Negative.    Genitourinary: Negative.    Musculoskeletal: Negative.    Skin: Negative.    Allergic/Immunologic: Negative.    Neurological: Negative.    Hematological: Negative.    Psychiatric/Behavioral: Negative.        Physical Exam  Vitals reviewed.   Constitutional:       Appearance: Normal appearance.   HENT:      Head: Normocephalic and atraumatic.   Eyes:      Extraocular Movements: Extraocular movements intact.      Pupils: Pupils are equal, round, and reactive to light.   Cardiovascular:      Rate and Rhythm: Normal rate and regular rhythm.   Pulmonary:      Effort: Pulmonary effort is normal. No respiratory distress.   Abdominal:      General: Abdomen is flat. There is no distension.      Palpations: Abdomen is soft. There is no mass.      Tenderness: There is no abdominal tenderness. There is no guarding or rebound.      Hernia: No hernia is present.   Musculoskeletal:         General: Normal range of motion.      Cervical back: Normal range of motion and neck supple.   Skin:     General: Skin is warm and dry.      Coloration: Skin is not jaundiced.   Neurological:      General: No focal deficit present.      Mental Status: She is alert and oriented to  person, place, and time.   Psychiatric:         Mood and Affect: Mood normal.         Behavior: Behavior normal.         Thought Content: Thought content normal.         Judgment: Judgment normal.           ASSESSMENT    Diagnoses and all orders for this visit:    1. Right upper quadrant abdominal pain (Primary)  -     Comprehensive Metabolic Panel; Future  -     lactated ringers infusion  -     sodium chloride 0.9 % flush 10 mL  -     sodium chloride 0.9 % flush 10 mL  -     sodium chloride 0.9 % infusion 40 mL  -     Case Request; Standing    Other orders  -     Follow Anesthesia Guidelines / Protocol; Future  -     Provide NPO Instructions to Patient; Future  -     Chlorhexidine Skin Prep; Future  -     Follow Anesthesia Guidelines / Protocol; Standing  -     Insert Peripheral IV; Standing  -     Saline Lock & Maintain IV Access; Standing  -     Verify NPO Status; Standing  -     Obtain Informed Consent; Standing  -     SCD (Sequential Compression Device) - Place on Patient in Pre-Op; Standing  -     Verify / Perform Chlorhexidine Skin Prep; Standing        PLAN    1.Laparoscopic possible open cholecystectomy and cholangiogram    The following were discussed with the patient/family:    What are the indications that have led your doctor to the opinion that an operation is necessary?    * Symptoms and studies indicate that there is gallbladder disease causing pain in the abdomen.    What, if any, alternative treatments are available for your condition?    * Watching and waiting with no surgery  * Removing the gallbladder through one large incision made in the abdomen.    What will be the likely result if you don't have the operation?    * Pain, infection, inflammation, and/or stones may continue or get worse    What are the basic procedures involved in the operation?    * The surgery may be done laparoscopically. Laparoscopic surgery is done using a scope and hollow tube(s) called ports. These are inserted through  small cuts in the abdomen. A scope is a thin, lighted instrument with a camera attached. Tools are passed through the ports. Carbon dioxide gas is pumped into the abdomen to create workspace.   If the surgery cannot be performed with a scope, it may be done through a larger cut. This occurs 2% of the time.  The gallbladder will be removed after it is  from the liver, bile duct, and surrounding arteries. An x-ray of the bile ducts is usually performed with contrast dye injected into the ducts.  If stones are found, another procedure may be required to remove them.  A drain may rarely be inserted to keep fluid from building up in the treatment area.     What are the risks?    * Exposure to radiation. Pregnant women and women of childbearing age should talk with their doctor about this.  * Pain, numbness, swelling, weakness or scarring where tissue is cut.  * The gas used in laparoscopic procedures to inflate the abdomen can become trapped in tissues. Gas in the bloodstream can dangerously affect blood flow and  heart function.  * The procedure may not cure or relieve your condition or symptoms. They may come back and even worsen.  * You may need additional tests or treatment.  * Bleeding. You may need blood transfusions or other treatments. This may be discovered during the procedure, or later.  * Embolism. An embolism is an object that moves through your body in your bloodstream. It can be an air bubble, a blood clot, a piece of fat or other material. It can block a blood vessel. This can lead to stroke, pulmonary embolism (blockage of the main artery of the lung), or injury to organs or extremities.  * Reactions to dye used for imaging. These may include hives, swelling of the face and/or throat, difficulty breathing, and kidney failure.  * Retained stones in bile ducts.  * Wound infection, poor healing or reopening. Blood or clear fluid can also collect at the wound site(s).  * Damage to the bile ducts or  nearby structures. This may be discovered during the procedure, or later.  * Incisional hernia. Weak scar tissue may allow tissue to bulge through the cut.  * The instrument(s) placed in your abdomen can cause injuries to nearby structures.  * Death.    How is the operation expected to improve your health or quality of life?    * Operation is expected to decrease pain and nausea/vomiting associated with gallstones.  * This procedure may relieve or prevent infection, inflammation, and/or pain from stones or blockage of bile ducts.    Is hospitalization necessary and, if so, how long can you expect to be hospitalized?    * Most often, the operation is performed on an outpatient basis. Occasionally hospitalization is necessary for pain or nausea control    What can you expect during your recovery period?    * The gas used in laparoscopic procedures to inflate the abdomen can become trapped in tissues. Shoulder pain may occur for a few days after surgery.   * Nausea and pain control are obtained with oral medication.  * If you are on metformin, it will need to be held for 48 hours after surgery    When can you expect to resume normal activities?    * Normal activity can be resumed in 10-14 days if the procedure is performed laparoscopically. Open operations require no lifting or straining for 6 weeks.     Are there likely to be residual effects from the operation?    * Diarrhea often occurs, mostly temporary. Occasionally there is still minor food intolerance.    All questions were answered. The patient agrees to operation.                    This document has been electronically signed by Chan Soto MD on December 6, 2022 13:44 CST

## 2022-12-09 ENCOUNTER — PRE-ADMISSION TESTING (OUTPATIENT)
Dept: PREADMISSION TESTING | Facility: HOSPITAL | Age: 32
End: 2022-12-09

## 2022-12-09 VITALS
HEART RATE: 76 BPM | DIASTOLIC BLOOD PRESSURE: 80 MMHG | OXYGEN SATURATION: 99 % | RESPIRATION RATE: 18 BRPM | SYSTOLIC BLOOD PRESSURE: 120 MMHG

## 2022-12-09 DIAGNOSIS — R10.11 RIGHT UPPER QUADRANT ABDOMINAL PAIN: ICD-10-CM

## 2022-12-09 LAB
ALBUMIN SERPL-MCNC: 3.7 G/DL (ref 3.5–5.2)
ALBUMIN/GLOB SERPL: 1.1 G/DL
ALP SERPL-CCNC: 71 U/L (ref 39–117)
ALT SERPL W P-5'-P-CCNC: 11 U/L (ref 1–33)
ANION GAP SERPL CALCULATED.3IONS-SCNC: 8 MMOL/L (ref 5–15)
AST SERPL-CCNC: 12 U/L (ref 1–32)
BILIRUB SERPL-MCNC: 0.2 MG/DL (ref 0–1.2)
BUN SERPL-MCNC: 10 MG/DL (ref 6–20)
BUN/CREAT SERPL: 16.7 (ref 7–25)
CALCIUM SPEC-SCNC: 8.5 MG/DL (ref 8.6–10.5)
CHLORIDE SERPL-SCNC: 105 MMOL/L (ref 98–107)
CO2 SERPL-SCNC: 27 MMOL/L (ref 22–29)
CREAT SERPL-MCNC: 0.6 MG/DL (ref 0.57–1)
EGFRCR SERPLBLD CKD-EPI 2021: 123.2 ML/MIN/1.73
GLOBULIN UR ELPH-MCNC: 3.4 GM/DL
GLUCOSE SERPL-MCNC: 83 MG/DL (ref 65–99)
POTASSIUM SERPL-SCNC: 3.7 MMOL/L (ref 3.5–5.2)
PROT SERPL-MCNC: 7.1 G/DL (ref 6–8.5)
SODIUM SERPL-SCNC: 140 MMOL/L (ref 136–145)

## 2022-12-09 PROCEDURE — 93005 ELECTROCARDIOGRAM TRACING: CPT

## 2022-12-09 PROCEDURE — 36415 COLL VENOUS BLD VENIPUNCTURE: CPT

## 2022-12-09 PROCEDURE — 93010 ELECTROCARDIOGRAM REPORT: CPT | Performed by: INTERNAL MEDICINE

## 2022-12-09 PROCEDURE — 80053 COMPREHEN METABOLIC PANEL: CPT

## 2022-12-09 NOTE — PAT
Chlorhexidine scrub given with instruction sheet.   Instruction reviewed in PAT, understanding verbalized.

## 2022-12-12 ENCOUNTER — PATIENT ROUNDING (BHMG ONLY) (OUTPATIENT)
Dept: SURGERY | Facility: CLINIC | Age: 32
End: 2022-12-12

## 2022-12-12 NOTE — PROGRESS NOTES
"December 12, 2022    Hello, may I speak with Angélica Hooper? This is her.    My name is Clarisse Harrell    I am a medical Assistant with [facility]    Before we get started may I verify your date of birth? 1990 Date Of Birth Verified.    I am calling to officially welcome you to our practice and ask about your recent visit. Is this a good time to talk? Yes.    Tell me about your visit with us. What things went well? '' was personable, he answered my questions, and was willing to help me.\"     We're always looking for ways to make our patients' experiences even better. Do you have recommendations on ways we may improve? No.    Overall were you satisfied with your first visit to our practice? Yes.     I appreciate you taking the time to speak with me today. Is there anything else I can do for you? No.      Thank you, and have a great day.    Patient is scheduled for surgery. Patient instructed to call the office with any questions or concerns.  "

## 2022-12-17 LAB
QT INTERVAL: 402 MS
QTC INTERVAL: 440 MS

## 2022-12-19 ENCOUNTER — ANESTHESIA EVENT (OUTPATIENT)
Dept: PERIOP | Facility: HOSPITAL | Age: 32
End: 2022-12-19

## 2022-12-19 ENCOUNTER — HOSPITAL ENCOUNTER (OUTPATIENT)
Facility: HOSPITAL | Age: 32
Setting detail: HOSPITAL OUTPATIENT SURGERY
Discharge: HOME | End: 2022-12-19
Attending: SURGERY | Admitting: SURGERY

## 2022-12-19 ENCOUNTER — ANESTHESIA (OUTPATIENT)
Dept: PERIOP | Facility: HOSPITAL | Age: 32
End: 2022-12-19

## 2022-12-19 ENCOUNTER — APPOINTMENT (OUTPATIENT)
Dept: GENERAL RADIOLOGY | Facility: HOSPITAL | Age: 32
End: 2022-12-19

## 2022-12-19 VITALS
HEIGHT: 70 IN | BODY MASS INDEX: 21.4 KG/M2 | TEMPERATURE: 97 F | HEART RATE: 65 BPM | DIASTOLIC BLOOD PRESSURE: 70 MMHG | OXYGEN SATURATION: 100 % | WEIGHT: 149.47 LBS | SYSTOLIC BLOOD PRESSURE: 127 MMHG | RESPIRATION RATE: 18 BRPM

## 2022-12-19 DIAGNOSIS — R10.11 RIGHT UPPER QUADRANT ABDOMINAL PAIN: Primary | ICD-10-CM

## 2022-12-19 LAB — B-HCG UR QL: NEGATIVE

## 2022-12-19 PROCEDURE — 25010000002 DIPHENHYDRAMINE PER 50 MG: Performed by: NURSE ANESTHETIST, CERTIFIED REGISTERED

## 2022-12-19 PROCEDURE — 88304 TISSUE EXAM BY PATHOLOGIST: CPT

## 2022-12-19 PROCEDURE — 47563 LAPARO CHOLECYSTECTOMY/GRAPH: CPT | Performed by: SURGERY

## 2022-12-19 PROCEDURE — 25010000002 HYDROMORPHONE 1 MG/ML SOLUTION: Performed by: NURSE ANESTHETIST, CERTIFIED REGISTERED

## 2022-12-19 PROCEDURE — 25010000002 ONDANSETRON PER 1 MG: Performed by: NURSE ANESTHETIST, CERTIFIED REGISTERED

## 2022-12-19 PROCEDURE — 25010000002 NEOSTIGMINE 10 MG/10ML SOLUTION: Performed by: NURSE ANESTHETIST, CERTIFIED REGISTERED

## 2022-12-19 PROCEDURE — 81025 URINE PREGNANCY TEST: CPT | Performed by: ANESTHESIOLOGY

## 2022-12-19 PROCEDURE — 76000 FLUOROSCOPY <1 HR PHYS/QHP: CPT

## 2022-12-19 PROCEDURE — 25010000002 IOPAMIDOL 61 % SOLUTION: Performed by: SURGERY

## 2022-12-19 PROCEDURE — 25010000002 FENTANYL CITRATE (PF) 50 MCG/ML SOLUTION: Performed by: NURSE ANESTHETIST, CERTIFIED REGISTERED

## 2022-12-19 PROCEDURE — 25010000002 PROPOFOL 200 MG/20ML EMULSION: Performed by: NURSE ANESTHETIST, CERTIFIED REGISTERED

## 2022-12-19 PROCEDURE — 74300 X-RAY BILE DUCTS/PANCREAS: CPT | Performed by: SURGERY

## 2022-12-19 PROCEDURE — 25010000002 EPINEPHRINE 1 MG/ML SOLUTION: Performed by: SURGERY

## 2022-12-19 PROCEDURE — 25010000002 DEXAMETHASONE PER 1 MG: Performed by: NURSE ANESTHETIST, CERTIFIED REGISTERED

## 2022-12-19 PROCEDURE — 25010000002 MIDAZOLAM PER 1 MG: Performed by: NURSE ANESTHETIST, CERTIFIED REGISTERED

## 2022-12-19 DEVICE — LIGAMAX 5 MM ENDOSCOPIC MULTIPLE CLIP APPLIER
Type: IMPLANTABLE DEVICE | Site: ABDOMEN | Status: FUNCTIONAL
Brand: LIGAMAX

## 2022-12-19 RX ORDER — MEPERIDINE HYDROCHLORIDE 25 MG/ML
12.5 INJECTION INTRAMUSCULAR; INTRAVENOUS; SUBCUTANEOUS
Status: DISCONTINUED | OUTPATIENT
Start: 2022-12-19 | End: 2022-12-19 | Stop reason: HOSPADM

## 2022-12-19 RX ORDER — ROCURONIUM BROMIDE 10 MG/ML
INJECTION, SOLUTION INTRAVENOUS AS NEEDED
Status: DISCONTINUED | OUTPATIENT
Start: 2022-12-19 | End: 2022-12-19 | Stop reason: SURG

## 2022-12-19 RX ORDER — CLINDAMYCIN PHOSPHATE 600 MG/50ML
INJECTION INTRAVENOUS AS NEEDED
Status: DISCONTINUED | OUTPATIENT
Start: 2022-12-19 | End: 2022-12-19 | Stop reason: SURG

## 2022-12-19 RX ORDER — NALOXONE HCL 0.4 MG/ML
0.4 VIAL (ML) INJECTION AS NEEDED
Status: DISCONTINUED | OUTPATIENT
Start: 2022-12-19 | End: 2022-12-19 | Stop reason: HOSPADM

## 2022-12-19 RX ORDER — ACETAMINOPHEN 650 MG/1
650 SUPPOSITORY RECTAL ONCE AS NEEDED
Status: DISCONTINUED | OUTPATIENT
Start: 2022-12-19 | End: 2022-12-19 | Stop reason: HOSPADM

## 2022-12-19 RX ORDER — ONDANSETRON 2 MG/ML
INJECTION INTRAMUSCULAR; INTRAVENOUS AS NEEDED
Status: DISCONTINUED | OUTPATIENT
Start: 2022-12-19 | End: 2022-12-19 | Stop reason: SURG

## 2022-12-19 RX ORDER — FENTANYL CITRATE 50 UG/ML
INJECTION, SOLUTION INTRAMUSCULAR; INTRAVENOUS AS NEEDED
Status: DISCONTINUED | OUTPATIENT
Start: 2022-12-19 | End: 2022-12-19 | Stop reason: SURG

## 2022-12-19 RX ORDER — ONDANSETRON 2 MG/ML
4 INJECTION INTRAMUSCULAR; INTRAVENOUS ONCE AS NEEDED
Status: DISCONTINUED | OUTPATIENT
Start: 2022-12-19 | End: 2022-12-19 | Stop reason: HOSPADM

## 2022-12-19 RX ORDER — BUPIVACAINE HYDROCHLORIDE AND EPINEPHRINE 5; 5 MG/ML; UG/ML
INJECTION, SOLUTION EPIDURAL; INTRACAUDAL; PERINEURAL AS NEEDED
Status: DISCONTINUED | OUTPATIENT
Start: 2022-12-19 | End: 2022-12-19 | Stop reason: HOSPADM

## 2022-12-19 RX ORDER — DIPHENHYDRAMINE HYDROCHLORIDE 50 MG/ML
12.5 INJECTION INTRAMUSCULAR; INTRAVENOUS
Status: DISCONTINUED | OUTPATIENT
Start: 2022-12-19 | End: 2022-12-19 | Stop reason: HOSPADM

## 2022-12-19 RX ORDER — MIDAZOLAM HYDROCHLORIDE 1 MG/ML
INJECTION INTRAMUSCULAR; INTRAVENOUS AS NEEDED
Status: DISCONTINUED | OUTPATIENT
Start: 2022-12-19 | End: 2022-12-19 | Stop reason: SURG

## 2022-12-19 RX ORDER — LIDOCAINE HYDROCHLORIDE 20 MG/ML
INJECTION, SOLUTION EPIDURAL; INFILTRATION; INTRACAUDAL; PERINEURAL AS NEEDED
Status: DISCONTINUED | OUTPATIENT
Start: 2022-12-19 | End: 2022-12-19 | Stop reason: SURG

## 2022-12-19 RX ORDER — DEXAMETHASONE SODIUM PHOSPHATE 4 MG/ML
INJECTION, SOLUTION INTRA-ARTICULAR; INTRALESIONAL; INTRAMUSCULAR; INTRAVENOUS; SOFT TISSUE AS NEEDED
Status: DISCONTINUED | OUTPATIENT
Start: 2022-12-19 | End: 2022-12-19 | Stop reason: SURG

## 2022-12-19 RX ORDER — FLUMAZENIL 0.1 MG/ML
0.2 INJECTION INTRAVENOUS AS NEEDED
Status: DISCONTINUED | OUTPATIENT
Start: 2022-12-19 | End: 2022-12-19 | Stop reason: HOSPADM

## 2022-12-19 RX ORDER — NEOSTIGMINE METHYLSULFATE 1 MG/ML
INJECTION, SOLUTION INTRAVENOUS AS NEEDED
Status: DISCONTINUED | OUTPATIENT
Start: 2022-12-19 | End: 2022-12-19 | Stop reason: SURG

## 2022-12-19 RX ORDER — SODIUM CHLORIDE 0.9 % (FLUSH) 0.9 %
10 SYRINGE (ML) INJECTION AS NEEDED
Status: DISCONTINUED | OUTPATIENT
Start: 2022-12-19 | End: 2022-12-19 | Stop reason: HOSPADM

## 2022-12-19 RX ORDER — PROMETHAZINE HYDROCHLORIDE 25 MG/1
25 SUPPOSITORY RECTAL ONCE AS NEEDED
Status: DISCONTINUED | OUTPATIENT
Start: 2022-12-19 | End: 2022-12-19 | Stop reason: HOSPADM

## 2022-12-19 RX ORDER — DIPHENHYDRAMINE HYDROCHLORIDE 50 MG/ML
INJECTION INTRAMUSCULAR; INTRAVENOUS AS NEEDED
Status: DISCONTINUED | OUTPATIENT
Start: 2022-12-19 | End: 2022-12-19 | Stop reason: SURG

## 2022-12-19 RX ORDER — EPHEDRINE SULFATE 50 MG/ML
5 INJECTION, SOLUTION INTRAVENOUS ONCE AS NEEDED
Status: DISCONTINUED | OUTPATIENT
Start: 2022-12-19 | End: 2022-12-19 | Stop reason: HOSPADM

## 2022-12-19 RX ORDER — SODIUM CHLORIDE 0.9 % (FLUSH) 0.9 %
10 SYRINGE (ML) INJECTION EVERY 12 HOURS SCHEDULED
Status: DISCONTINUED | OUTPATIENT
Start: 2022-12-19 | End: 2022-12-19 | Stop reason: HOSPADM

## 2022-12-19 RX ORDER — SODIUM CHLORIDE 9 MG/ML
40 INJECTION, SOLUTION INTRAVENOUS AS NEEDED
Status: DISCONTINUED | OUTPATIENT
Start: 2022-12-19 | End: 2022-12-19 | Stop reason: HOSPADM

## 2022-12-19 RX ORDER — CLINDAMYCIN PHOSPHATE 150 MG/ML
INJECTION, SOLUTION INTRAVENOUS AS NEEDED
Status: DISCONTINUED | OUTPATIENT
Start: 2022-12-19 | End: 2022-12-19

## 2022-12-19 RX ORDER — SODIUM CHLORIDE, SODIUM LACTATE, POTASSIUM CHLORIDE, CALCIUM CHLORIDE 600; 310; 30; 20 MG/100ML; MG/100ML; MG/100ML; MG/100ML
100 INJECTION, SOLUTION INTRAVENOUS CONTINUOUS
Status: DISCONTINUED | OUTPATIENT
Start: 2022-12-19 | End: 2022-12-19 | Stop reason: HOSPADM

## 2022-12-19 RX ORDER — HYDROCODONE BITARTRATE AND ACETAMINOPHEN 5; 325 MG/1; MG/1
1 TABLET ORAL EVERY 4 HOURS PRN
Qty: 15 TABLET | Refills: 0 | Status: SHIPPED | OUTPATIENT
Start: 2022-12-19 | End: 2022-12-27

## 2022-12-19 RX ORDER — PROPOFOL 10 MG/ML
INJECTION, EMULSION INTRAVENOUS AS NEEDED
Status: DISCONTINUED | OUTPATIENT
Start: 2022-12-19 | End: 2022-12-19 | Stop reason: SURG

## 2022-12-19 RX ORDER — PROMETHAZINE HYDROCHLORIDE 25 MG/1
25 TABLET ORAL ONCE AS NEEDED
Status: DISCONTINUED | OUTPATIENT
Start: 2022-12-19 | End: 2022-12-19 | Stop reason: HOSPADM

## 2022-12-19 RX ORDER — ACETAMINOPHEN 325 MG/1
650 TABLET ORAL ONCE AS NEEDED
Status: DISCONTINUED | OUTPATIENT
Start: 2022-12-19 | End: 2022-12-19 | Stop reason: HOSPADM

## 2022-12-19 RX ORDER — FENTANYL CITRATE 50 UG/ML
50 INJECTION, SOLUTION INTRAMUSCULAR; INTRAVENOUS
Status: CANCELLED | OUTPATIENT
Start: 2022-12-19

## 2022-12-19 RX ORDER — 0.9 % SODIUM CHLORIDE 0.9 %
VIAL (ML) INJECTION AS NEEDED
Status: DISCONTINUED | OUTPATIENT
Start: 2022-12-19 | End: 2022-12-19 | Stop reason: HOSPADM

## 2022-12-19 RX ADMIN — CLINDAMYCIN PHOSPHATE 600 MG: 600 INJECTION, SOLUTION INTRAVENOUS at 13:15

## 2022-12-19 RX ADMIN — DIPHENHYDRAMINE HYDROCHLORIDE 12.5 MG: 50 INJECTION INTRAMUSCULAR; INTRAVENOUS at 13:20

## 2022-12-19 RX ADMIN — HYDROMORPHONE HYDROCHLORIDE 0.5 MG: 1 INJECTION, SOLUTION INTRAMUSCULAR; INTRAVENOUS; SUBCUTANEOUS at 14:33

## 2022-12-19 RX ADMIN — ROCURONIUM BROMIDE 50 MG: 10 INJECTION INTRAVENOUS at 13:06

## 2022-12-19 RX ADMIN — MIDAZOLAM HYDROCHLORIDE 2 MG: 1 INJECTION, SOLUTION INTRAMUSCULAR; INTRAVENOUS at 13:02

## 2022-12-19 RX ADMIN — PROPOFOL 80 MG: 10 INJECTION, EMULSION INTRAVENOUS at 13:06

## 2022-12-19 RX ADMIN — LIDOCAINE HYDROCHLORIDE 80 MG: 20 INJECTION, SOLUTION EPIDURAL; INFILTRATION; INTRACAUDAL; PERINEURAL at 13:06

## 2022-12-19 RX ADMIN — NEOSTIGMINE METHYLSULFATE 2 MG: 0.5 INJECTION INTRAVENOUS at 14:06

## 2022-12-19 RX ADMIN — FENTANYL CITRATE 50 MCG: 50 INJECTION, SOLUTION INTRAMUSCULAR; INTRAVENOUS at 13:06

## 2022-12-19 RX ADMIN — SODIUM CHLORIDE, POTASSIUM CHLORIDE, SODIUM LACTATE AND CALCIUM CHLORIDE 100 ML/HR: 600; 310; 30; 20 INJECTION, SOLUTION INTRAVENOUS at 11:37

## 2022-12-19 RX ADMIN — GLYCOPYRROLATE 0.4 MG: 0.2 INJECTION, SOLUTION INTRAMUSCULAR; INTRAVITREAL at 14:06

## 2022-12-19 RX ADMIN — HYDROMORPHONE HYDROCHLORIDE 0.5 MG: 1 INJECTION, SOLUTION INTRAMUSCULAR; INTRAVENOUS; SUBCUTANEOUS at 14:23

## 2022-12-19 RX ADMIN — ONDANSETRON 4 MG: 2 INJECTION INTRAMUSCULAR; INTRAVENOUS at 13:59

## 2022-12-19 RX ADMIN — HYDROMORPHONE HYDROCHLORIDE 0.5 MG: 1 INJECTION, SOLUTION INTRAMUSCULAR; INTRAVENOUS; SUBCUTANEOUS at 14:48

## 2022-12-19 RX ADMIN — DEXAMETHASONE SODIUM PHOSPHATE 8 MG: 4 INJECTION, SOLUTION INTRAMUSCULAR; INTRAVENOUS at 13:59

## 2022-12-19 NOTE — ANESTHESIA PREPROCEDURE EVALUATION
Anesthesia Evaluation     Patient summary reviewed and Nursing notes reviewed   no history of anesthetic complications:  NPO Solid Status: > 8 hours  NPO Liquid Status: > 2 hours           Airway   Mallampati: II  TM distance: <3 FB  Neck ROM: full  No difficulty expected and Anterior  Dental - normal exam     Pulmonary     breath sounds clear to auscultation  (+) a smoker Former,   (-) asthma  Cardiovascular - normal exam  Exercise tolerance: good (4-7 METS)    ECG reviewed  Rhythm: regular  Rate: normal    (+) hyperlipidemia,   (-) past MI, dysrhythmias, angina, WEINSTEIN, murmur, DVT    ROS comment: Reports of heart palpitations in 2016 followed by dr. schwartz was due to caffeine.    EK22  Normal sinus rhythm  RSR' pattern in V1  Borderline ECG  No previous ECGs available  PE comment: Previous reports of heart palpitations, none present today upon preoperative assessment    Neuro/Psych  (+) psychiatric history Depression,    (-) seizures, TIA, CVA  GI/Hepatic/Renal/Endo    (+)  GERD well controlled,    (-) diabetes    Musculoskeletal     Abdominal   (-) obese   Substance History - negative use     OB/GYN    (-)  Pregnant        Other                        Anesthesia Plan    ASA 2     general     intravenous induction

## 2022-12-19 NOTE — ANESTHESIA POSTPROCEDURE EVALUATION
Patient: Angélica Hooper    Procedure Summary     Date: 12/19/22 Room / Location: [facility] OR 09 / [facility] OR    Anesthesia Start: 1302 Anesthesia Stop: 1423    Procedure: LAPAROSCOPIC POSSIBLE OPEN CHOLECYSTECTOMY  CHOLANGIOGRAM              (C-ARM#1) (Abdomen) Diagnosis:       Right upper quadrant abdominal pain      (Right upper quadrant abdominal pain [R10.11])    Surgeons: Chan Soto MD Provider: Ang Rangel MD    Anesthesia Type: general ASA Status: 2          Anesthesia Type: general    Vitals  Vitals Value Taken Time   /85 12/19/22 1417   Temp 98.2 °F (36.8 °C) 12/19/22 1417   Pulse 86 12/19/22 1417   Resp 18 12/19/22 1417   SpO2 100 % 12/19/22 1417           Post Anesthesia Care and Evaluation    Patient location during evaluation: PACU  Patient participation: complete - patient cannot participate  Level of consciousness: awake and alert  Pain score: 8  Pain management: inadequate (dilaudid given to address)    Airway patency: patent  Anesthetic complications: No anesthetic complications  PONV Status: none  Cardiovascular status: stable, acceptable and hemodynamically stable  Respiratory status: acceptable and unassisted  Hydration status: acceptable    Comments: HR: 87  BP: 153/85  Sp02: 100% on RA  Temp: 98.2F

## 2022-12-19 NOTE — OP NOTE
CHOLECYSTECTOMY LAPAROSCOPIC INTRAOPERATIVE CHOLANGIOGRAM  Procedure Note    Angélica Hooper  12/19/2022    Pre-op Diagnosis:   Right upper quadrant abdominal pain [R10.11]    Post-op Diagnosis:     Right upper quadrant abdominal pain [R10.11]    Procedure:  LAPAROSCOPIC POSSIBLE OPEN CHOLECYSTECTOMY  CHOLANGIOGRAM              (C-ARM#1)  Interpretation of IOC    Surgeon:  Chan Soto MD    Resident surgeon:  Earl Jain MD    Anesthesia: General    Staff:   Circulator: Goldie Carter RN; Alma Delia Leon RN  Radiology Technologist: Della Russell  Scrub Person: Le Ayala  Assistant: Nyasia Acosta    Assistant: Nyasia Acosta was responsible for performing the following activities: Retraction, Suction, Closing, Placing Dressing and Held/Positioned Camera and their skilled assistance was necessary for the success of this case.     Estimated Blood Loss: none    Specimens:                ID Type Source Tests Collected by Time   A (Not marked as sent) : gallbladder and contents Tissue Gallbladder TISSUE EXAM, [facility] ([facility]) Chan Soto MD 12/19/2022 1348         Drains: * No LDAs found *    Findings: Mild inflammation     Complications: None    INDICATION:  This is a 32-year-old with epigastric and right upper quadrant abdominal pain; negative  ultrasound for stones. Positive HIDA scan. Taken to the operating room for laparoscopic cholecystectomy.    DESCRIPTION:  The patient was brought to the operating room and placed supine on the operating table. After adequate general endotracheal anesthesia, the abdominal area was prepped and draped in a sterile manner. A briefing and timeout were performed and all parties were in agreement.     A transverse incision was made slightly to the right of the midline in the epigastrium subcostally. A 5 mm XCEL trocar was uneventfully passed into the abdomen under direct vision with no visceral injury. The abdomen was insufflated to a total of  15 mmHg, 4.8 L of CO2. A 5 mm laparoscope revealed an excellent view of the upper and lower abdominal areas. A longitudinal skin incision was made at the umbilicus and a 5 mm  XCEL trocar was placed under direct vision with no visceral injury. The camera was then moved to the umbilical port site and an excellent view of the upper abdomen was obtained. An incision was made at the tip of the 12th rib on the right side and carried into the subcutaneous tissue. A 5 mm  XCEL trocar was uneventfully passed into the abdomen under direct vision with no visceral injury. The epigastric port site was upsized to an 11 mm trocar. The bed was then tilted in reverse Trendelenburg and tipped to the left. The patient tolerated the positioning and insufflation without difficulty.    The gallbladder was retracted upwards and outwards by grasping the infundibulum of the gallbladder with an atraumatic grasper passed through the lateral ports. The peritoneum around the infundibulum was taken down for a distance of 1/3 of the length of the gallbladder on the anterior and posterior sides. The cystic duct and artery easily came into view as did the 5th segment of the liver behind these structures.     A Waller clamp was placed across the infundibulum and a fluoroscopic cholangiogram was performed by piercing the infundibulum with a needle and injecting contrast. This demonstrated good filling proximally and distally, intact bile ducts, no stones in the common duct, and good emptying into the duodenum.     The cholangiocath was removed and the cystic duct was doubly clipped and divided. The cystic artery was doubly clipped and divided in all branches. The gallbladder was then dissected out of the liver bed using electrocautery. Once it had been nearly completely excised, pressure in the abdomen was reduced to 8 mmHg with no further bleeding being noted from the liver bed. The remainder of the gallbladder was dissected free.  It was placed  into an Endobag and brought out intact through the epigastric port.     All areas were visualized for bleeding and bile leak. None was seen. The patient was then placed supine.  The epigastric trocar was removed and the fascia was closed with 0 Vicryl using an Endo Close device.  The remainder of the trocars were removed and the abdomen was allowed to flatten. All port sites were closed with 4-0 subcuticular on the skin.    The procedure was terminated. It was well tolerated. Sponge and needle counts were correct, and the patient was transferred to recovery in satisfactory condition.            This document has been electronically signed by Chan Soto MD on December 19, 2022 14:21 CST       Date: 12/19/2022  Time: 14:21 CST

## 2022-12-19 NOTE — INTERVAL H&P NOTE
H&P reviewed. The patient was examined and there are no changes to the H&P.      Temp:  [97.6 °F (36.4 °C)] 97.6 °F (36.4 °C)  Heart Rate:  [77] 77  Resp:  [18] 18  BP: (143)/(91) 143/91

## 2022-12-19 NOTE — DISCHARGE INSTRUCTIONS
Dr. Chan Soto  [address] [place] [address]  [phone] (office)  [phone] (hospital)  [phone] (cell)  Discharge Instructions for Gall Bladder Surgery      Go home, rest and take it easy today; however, you should get up and move about several times today to reduce the risk of developing a clot in your legs.    You may experience some dizziness or memory loss from the anesthesia.  This may last for the next 24 hours.  Someone should plan on staying with you for the first 24 hours for your safety.  Do not make any important legal decisions or sign any legal papers for the next 24 hours.    Eat and drink lightly today.  Start off with liquids, jello, soup, crackers or other bland foods at first. You may advance your diet tomorrow as tolerated as long as you do not experience any nausea or vomiting.   You may remove your outer dressings in 3 days.  The white tapes called steri-strips should stay in place.  They will fall off on their own in 1-2 weeks.  Do not worry if they come off sooner.    You may notice some bleeding/drainage on your outer dressings. A little bloody drainage is normal. If the bleeding/drainage is such that the bandage cannot absorb it, remove the dressing, apply clean gauze and apply firm pressure for a full 15 minutes.  If the bleeding continues, please call me.  You may shower tomorrow.  No tub baths for at least 1 week until your incisions are completely healed.       You have received a prescription for a narcotic pain medicine, as you will have some pain following surgery.   You will not be totally pain free, but your pain medicine should make the pain tolerable.  Please take your pain medicine as prescribed and always take your pills with food to prevent nausea. If you are having severe pain that cannot be controlled by the pain medicine, please contact me.    If needed, you may receive a prescription for an anti-nausea medicine.  Please take this as prescribed for any  nausea or vomiting.  Nausea could be a result of the anesthesia or a result of the narcotic pain medicine.  If you experience severe nausea and vomiting that cannot be controlled by the nausea medicine, please call me.    It is not unusual to experience pain/discomfort in your shoulders or under your ribs after surgery.  It is from the gas used during the laparoscopic procedure and usually lasts 1-3 days.  The prescription pain medicine is used to treat the surgical pain and does not typically alleviate this “gassy” pain.   No driving for 24 hours and for as long as you are taking your prescription pain medicine.    If an appointment is not given to you before discharge, you will need to call the office       at [phone] to schedule a follow-up appointment in 5-7 days.   Remember to contact me for any of the following:    Fever > 101 degrees  Severe pain that cannot be controlled by taking your pain pills  Severe nausea or vomiting that cannot be controlled by taking your nausea pills  Significant bleeding of your incisions  Drainage that has a bad smell or is yellow or green in appearance  Any other questions or concerns

## 2022-12-22 LAB — REF LAB TEST METHOD: NORMAL

## 2022-12-27 ENCOUNTER — OFFICE VISIT (OUTPATIENT)
Dept: SURGERY | Facility: CLINIC | Age: 32
End: 2022-12-27

## 2022-12-27 VITALS
OXYGEN SATURATION: 100 % | TEMPERATURE: 97.8 F | HEIGHT: 70 IN | BODY MASS INDEX: 21.59 KG/M2 | DIASTOLIC BLOOD PRESSURE: 66 MMHG | WEIGHT: 150.8 LBS | HEART RATE: 78 BPM | SYSTOLIC BLOOD PRESSURE: 122 MMHG

## 2022-12-27 DIAGNOSIS — R10.11 RIGHT UPPER QUADRANT ABDOMINAL PAIN: Primary | ICD-10-CM

## 2022-12-27 PROCEDURE — 99024 POSTOP FOLLOW-UP VISIT: CPT | Performed by: NURSE PRACTITIONER

## 2022-12-27 NOTE — PROGRESS NOTES
CHIEF COMPLAINT:   Chief Complaint   Patient presents with   • Post-op Follow-up     Post-op Lap. Cholecystectomy 12-19-22       HPI: This patient presents for a post-operative visit after undergoing a laparoscopic Cholecystectomy with normal intraoperative cholangiogram by Dr. Soto.    Patient reports no problems. Eating well without any significant nausea. Having good bowel function. No problems with constipation or diarrhea. No urinary complaints. Denies fever. Ambulating well and slowly returning to normal activities.      PATHOLOGY:       PHYSICAL EXAM:  ABD: Incisions are healing well without any erythema or signs of infection abdomen is soft and non distended.      ASSESSMENT:  Diagnoses and all orders for this visit:    1. Right upper quadrant abdominal pain (Primary)        PLAN:  1.The patient will follow-up on a prn basis unless there are any problems.  2. May shower.   3. May return to normal activity without restrictions in 1 week.                      This document has been electronically signed by NEO Saucedo on December 27, 2022 10:10 CST

## 2023-08-21 ENCOUNTER — INITIAL PRENATAL (OUTPATIENT)
Dept: OBSTETRICS AND GYNECOLOGY | Facility: CLINIC | Age: 33
End: 2023-08-21
Payer: COMMERCIAL

## 2023-08-21 ENCOUNTER — LAB (OUTPATIENT)
Dept: LAB | Facility: HOSPITAL | Age: 33
End: 2023-08-21
Payer: COMMERCIAL

## 2023-08-21 VITALS — BODY MASS INDEX: 26.83 KG/M2 | DIASTOLIC BLOOD PRESSURE: 78 MMHG | SYSTOLIC BLOOD PRESSURE: 120 MMHG | WEIGHT: 187 LBS

## 2023-08-21 DIAGNOSIS — Z32.01 POSITIVE PREGNANCY TEST: ICD-10-CM

## 2023-08-21 DIAGNOSIS — Z32.00 PREGNANCY EXAMINATION OR TEST, PREGNANCY UNCONFIRMED: ICD-10-CM

## 2023-08-21 DIAGNOSIS — Z87.59 HISTORY OF MISCARRIAGE: ICD-10-CM

## 2023-08-21 DIAGNOSIS — O36.80X0 ENCOUNTER TO DETERMINE FETAL VIABILITY OF PREGNANCY, SINGLE OR UNSPECIFIED FETUS: ICD-10-CM

## 2023-08-21 DIAGNOSIS — Z34.80 PRENATAL CARE OF MULTIGRAVIDA, ANTEPARTUM: Primary | ICD-10-CM

## 2023-08-21 LAB
ABO GROUP BLD: NORMAL
AMPHET+METHAMPHET UR QL: NEGATIVE
AMPHETAMINES UR QL: NEGATIVE
B-HCG UR QL: POSITIVE
BARBITURATES UR QL SCN: NEGATIVE
BASOPHILS # BLD AUTO: 0.02 10*3/MM3 (ref 0–0.2)
BASOPHILS NFR BLD AUTO: 0.3 % (ref 0–1.5)
BENZODIAZ UR QL SCN: NEGATIVE
BILIRUB UR QL STRIP: NEGATIVE
BLD GP AB SCN SERPL QL: NEGATIVE
BUPRENORPHINE SERPL-MCNC: NEGATIVE NG/ML
CANNABINOIDS SERPL QL: NEGATIVE
CLARITY UR: CLEAR
COCAINE UR QL: NEGATIVE
COLOR UR: YELLOW
DEPRECATED RDW RBC AUTO: 45 FL (ref 37–54)
EOSINOPHIL # BLD AUTO: 0.05 10*3/MM3 (ref 0–0.4)
EOSINOPHIL NFR BLD AUTO: 0.7 % (ref 0.3–6.2)
ERYTHROCYTE [DISTWIDTH] IN BLOOD BY AUTOMATED COUNT: 14.4 % (ref 12.3–15.4)
EXPIRATION DATE: ABNORMAL
FENTANYL UR-MCNC: NEGATIVE NG/ML
GLUCOSE UR STRIP-MCNC: NEGATIVE MG/DL
HBV SURFACE AG SERPL QL IA: NORMAL
HCG INTACT+B SERPL-ACNC: NORMAL MIU/ML
HCT VFR BLD AUTO: 35.8 % (ref 34–46.6)
HCV AB SER DONR QL: NORMAL
HGB BLD-MCNC: 12.5 G/DL (ref 12–15.9)
HGB UR QL STRIP.AUTO: NEGATIVE
HIV 1+2 AB+HIV1 P24 AG SERPL QL IA: NORMAL
IMM GRANULOCYTES # BLD AUTO: 0.02 10*3/MM3 (ref 0–0.05)
IMM GRANULOCYTES NFR BLD AUTO: 0.3 % (ref 0–0.5)
INTERNAL NEGATIVE CONTROL: NEGATIVE
INTERNAL POSITIVE CONTROL: POSITIVE
KETONES UR QL STRIP: NEGATIVE
LEUKOCYTE ESTERASE UR QL STRIP.AUTO: ABNORMAL
LYMPHOCYTES # BLD AUTO: 2.44 10*3/MM3 (ref 0.7–3.1)
LYMPHOCYTES NFR BLD AUTO: 36 % (ref 19.6–45.3)
Lab: ABNORMAL
Lab: NORMAL
MCH RBC QN AUTO: 30.3 PG (ref 26.6–33)
MCHC RBC AUTO-ENTMCNC: 34.9 G/DL (ref 31.5–35.7)
MCV RBC AUTO: 86.9 FL (ref 79–97)
METHADONE UR QL SCN: NEGATIVE
MONOCYTES # BLD AUTO: 0.67 10*3/MM3 (ref 0.1–0.9)
MONOCYTES NFR BLD AUTO: 9.9 % (ref 5–12)
NEUTROPHILS NFR BLD AUTO: 3.57 10*3/MM3 (ref 1.7–7)
NEUTROPHILS NFR BLD AUTO: 52.8 % (ref 42.7–76)
NITRITE UR QL STRIP: NEGATIVE
NRBC BLD AUTO-RTO: 0 /100 WBC (ref 0–0.2)
OPIATES UR QL: NEGATIVE
OXYCODONE UR QL SCN: NEGATIVE
PCP UR QL SCN: NEGATIVE
PH UR STRIP.AUTO: 8.5 [PH] (ref 5–8)
PLATELET # BLD AUTO: 232 10*3/MM3 (ref 140–450)
PMV BLD AUTO: 9.8 FL (ref 6–12)
PROPOXYPH UR QL: NEGATIVE
PROT UR QL STRIP: ABNORMAL
RBC # BLD AUTO: 4.12 10*6/MM3 (ref 3.77–5.28)
RH BLD: POSITIVE
RPR SER QL: NORMAL
SP GR UR STRIP: 1.02 (ref 1–1.03)
TRICYCLICS UR QL SCN: NEGATIVE
UROBILINOGEN UR QL STRIP: ABNORMAL
WBC NRBC COR # BLD: 6.77 10*3/MM3 (ref 3.4–10.8)

## 2023-08-21 PROCEDURE — 84702 CHORIONIC GONADOTROPIN TEST: CPT | Performed by: OBSTETRICS & GYNECOLOGY

## 2023-08-21 PROCEDURE — 81025 URINE PREGNANCY TEST: CPT | Performed by: OBSTETRICS & GYNECOLOGY

## 2023-08-21 PROCEDURE — 87661 TRICHOMONAS VAGINALIS AMPLIF: CPT | Performed by: OBSTETRICS & GYNECOLOGY

## 2023-08-21 PROCEDURE — 86787 VARICELLA-ZOSTER ANTIBODY: CPT | Performed by: OBSTETRICS & GYNECOLOGY

## 2023-08-21 PROCEDURE — 81003 URINALYSIS AUTO W/O SCOPE: CPT | Performed by: OBSTETRICS & GYNECOLOGY

## 2023-08-21 PROCEDURE — 80307 DRUG TEST PRSMV CHEM ANLYZR: CPT | Performed by: OBSTETRICS & GYNECOLOGY

## 2023-08-21 PROCEDURE — 86803 HEPATITIS C AB TEST: CPT | Performed by: OBSTETRICS & GYNECOLOGY

## 2023-08-21 PROCEDURE — 87491 CHLMYD TRACH DNA AMP PROBE: CPT | Performed by: OBSTETRICS & GYNECOLOGY

## 2023-08-21 PROCEDURE — 87086 URINE CULTURE/COLONY COUNT: CPT | Performed by: OBSTETRICS & GYNECOLOGY

## 2023-08-21 PROCEDURE — 80081 OBSTETRIC PANEL INC HIV TSTG: CPT | Performed by: OBSTETRICS & GYNECOLOGY

## 2023-08-21 PROCEDURE — 87591 N.GONORRHOEAE DNA AMP PROB: CPT | Performed by: OBSTETRICS & GYNECOLOGY

## 2023-08-21 PROCEDURE — 36415 COLL VENOUS BLD VENIPUNCTURE: CPT

## 2023-08-21 RX ORDER — PRENATAL VIT NO.126/IRON/FOLIC 28MG-0.8MG
TABLET ORAL DAILY
COMMUNITY

## 2023-08-21 NOTE — PROGRESS NOTES
I spent approximately 40 minutes with the patient acquiring the health and history intake, reviewing prior records, discussing topics related to healthy pregnancy, entering orders, and documentation. Her LMP is 7/3/23. She had a positive UPT in the office today.  This is her 5th pregnancy. She had a vaginal delivery in 2015, 2018, and 2021. She had a miscarriage in 2017.   A newob bag is given. The 1st trimester teaching was done with the patient. We discussed a healthy diet and exercise and what is recommended. I also discussed Listeriosis and Toxoplasmosis and what fish to avoid due to high mercury levels. I informed patient not to be in hot tubs, saunas, or tanning beds. We discussed that spotting may occur after intercourse which is common, but if heavy bleeding like a period occurs to call [facility] or hospital if clinic is closed.  I encouraged her to make an appointment with the dentist if she has not had a dental exam and cleaning in the last 6 months. The patient denies use of alcohol, illicit drug use, and tobacco smoking. She filled out the depression screening questionnaire and scored 2.  She is unsure if she is going to breastfeed. I encouraged the patient to get the TDAP vaccine in the 3rd trimester.  I discussed with the patient that a pediatrician needs to be chosen prior to delivery for the infant to have an appointment scheduled before leaving the hospital. Her children see Shanika LARSON.  I discussed lab tests will be done today. Her last pap smear was negative with HPV negative on 8/2/21. All questions were answered at this time. She is scheduled for an ultrasound and to see Shaneka LARSON on 8/28/23.

## 2023-08-22 LAB
BACTERIA SPEC AEROBE CULT: NO GROWTH
C TRACH RRNA CVX QL NAA+PROBE: NEGATIVE
N GONORRHOEA RRNA SPEC QL NAA+PROBE: NEGATIVE
RUBV IGG SERPL IA-ACNC: 2.88 INDEX
TRICHOMONAS VAGINALIS PCR: NEGATIVE
VZV IGG SER IA-ACNC: 492 INDEX

## 2023-08-28 ENCOUNTER — INITIAL PRENATAL (OUTPATIENT)
Dept: OBSTETRICS AND GYNECOLOGY | Facility: CLINIC | Age: 33
End: 2023-08-28
Payer: COMMERCIAL

## 2023-08-28 VITALS — WEIGHT: 184 LBS | DIASTOLIC BLOOD PRESSURE: 70 MMHG | SYSTOLIC BLOOD PRESSURE: 118 MMHG | BODY MASS INDEX: 26.4 KG/M2

## 2023-08-28 DIAGNOSIS — Z64.1 MULTIGRAVIDA: ICD-10-CM

## 2023-08-28 DIAGNOSIS — O21.9 NAUSEA AND VOMITING DURING PREGNANCY PRIOR TO 22 WEEKS GESTATION: ICD-10-CM

## 2023-08-28 DIAGNOSIS — Z3A.08 8 WEEKS GESTATION OF PREGNANCY: Primary | ICD-10-CM

## 2023-08-28 PROCEDURE — 0501F PRENATAL FLOW SHEET: CPT

## 2023-08-28 RX ORDER — ONDANSETRON 8 MG/1
8 TABLET, ORALLY DISINTEGRATING ORAL EVERY 8 HOURS PRN
Qty: 30 TABLET | Refills: 3 | Status: SHIPPED | OUTPATIENT
Start: 2023-08-28 | End: 2023-09-27

## 2023-08-28 NOTE — PROGRESS NOTES
[facility]  Obstetrics  Date of Service: 2023    CHIEF COMPLAINT:  New prenatal visit    HISTORY OF PRESENT ILLNESS:  Angélica Hooper is a 33 y.o. y/o  at 8w0d by LMP (Patient's last menstrual period was 2023 (exact date).).  This was a planned pregnancy and the patient is supported by family at home.  Reports  nausea without vomiting. She is not taking any medication at this time.  Reports breast tenderness.  She denies any vaginal bleeding.  She has  started taking a prenatal vitamin.    REVIEW OF SYSTEMS  Review of Systems   Constitutional:  Negative for appetite change, chills, fatigue and fever.   Respiratory:  Negative for apnea, cough, choking, chest tightness, shortness of breath, wheezing and stridor.    Cardiovascular:  Negative for chest pain, palpitations and leg swelling.   Gastrointestinal:  Positive for nausea. Negative for abdominal pain, constipation, diarrhea and vomiting.   Genitourinary:  Negative for amenorrhea, breast discharge, breast lump, breast pain, decreased libido, decreased urine volume, difficulty urinating, dyspareunia, dysuria, flank pain, frequency, genital sores, hematuria, menstrual problem, pelvic pain, pelvic pressure, urgency, urinary incontinence, vaginal bleeding, vaginal discharge and vaginal pain.     PRENATAL RISK FACTORS   Problems (from 23 to present)       Problem Noted Resolved    Nausea and vomiting during pregnancy prior to 22 weeks gestation 2023 by Shaneka Russ APRN No    Overview Signed 2023 11:08 AM by Shaneka Russ APRN     Start Zofran          Multigravida 2023 by Shaneka Russ APRN No    Overview Signed 2023 11:09 AM by Shaneka Russ APRN     3 Vaginal deliveries                  DATING CRITERIA:  LMP (7/3/23) -- MICHELLE 24  1TUS (23 at 8w1d) -- MICHELLE 24    OBSTETRIC HISTORY:  OB History    Para Term  AB Living   5 3 3   1 3   SAB IAB Ectopic Molar Multiple Live  Births   1       0 3      # Outcome Date GA Lbr Yoseph/2nd Weight Sex Delivery Anes PTL Lv   5 Current            4 Term 06/17/21 39w4d 08:22 / 00:15 3480 g (7 lb 10.8 oz) F Vag-Spont EPI N LEOPOLDO   3 Term 08/31/18 38w6d 11:15 / 00:15 3260 g (7 lb 3 oz) M Vag-Spont EPI N LEOPOLDO   2 SAB 07/2017 5w0d          1 Term 12/30/15 39w6d  3600 g (7 lb 15 oz) M Vag-Spont   LEOPOLDO      Complications: Perineal laceration, second degree     GYN HISTORY:  Denies h/o sexually transmitted infections/pelvic inflammatory disease  Denies h/o abnormal pap smears  Last pap smear:   Last Completed Pap Smear            PAP SMEAR (Every 3 Years) Next due on 8/2/2024 08/02/2021  Liquid-based Pap Smear, Screening    02/06/2017  Liquid-based Pap Smear, Screening                  Denies h/o gynecologic surgeries, including biopsies of the cervix    PAST MEDICAL HISTORY:  Past Medical History:   Diagnosis Date    Anxiety     Depression     GERD (gastroesophageal reflux disease)     Hyperlipidemia     Palpitation     caffiene induced     PAST SURGICAL HISTORY:  Past Surgical History:   Procedure Laterality Date    CHOLECYSTECTOMY WITH INTRAOPERATIVE CHOLANGIOGRAM N/A 12/19/2022    Procedure: LAPAROSCOPIC POSSIBLE OPEN CHOLECYSTECTOMY  CHOLANGIOGRAM              (C-ARM#1);  Surgeon: Chan Soto MD;  Location: [facility] OR;  Service: General;  Laterality: N/A;    COLONOSCOPY N/A 06/30/2022    Procedure: COLONOSCOPY;  Surgeon: Yo Pinto MD;  Location: [facility] ENDOSCOPY;  Service: Gastroenterology;  Laterality: N/A;    ENDOSCOPY N/A 06/30/2022    Procedure: ESOPHAGOGASTRODUODENOSCOPY;  Surgeon: Yo Pinto MD;  Location: [facility] ENDOSCOPY;  Service: Gastroenterology;  Laterality: N/A;    TONSILLECTOMY  1998    adenoidectomy    WISDOM TOOTH EXTRACTION       FAMILY HISTORY:  Family History   Problem Relation Age of Onset    Heart disease Mother     Hypertension Father     Idiopathic pulmonary fibrosis Father     Hypertension Brother     No  Known Problems Daughter     No Known Problems Son     No Known Problems Son     Heart attack Maternal Grandmother     Bone cancer Maternal Grandfather     Cirrhosis Maternal Grandfather     Idiopathic pulmonary fibrosis Paternal Grandmother     Stroke Other     Diabetes Other     Liver disease Other     Crohn's disease Other      SOCIAL HISTORY:  Social History     Socioeconomic History    Marital status:    Tobacco Use    Smoking status: Former     Packs/day: 0.25     Years: 1.00     Pack years: 0.25     Types: Cigarettes     Quit date: 2013     Years since quitting: 10.6    Smokeless tobacco: Never   Vaping Use    Vaping Use: Never used   Substance and Sexual Activity    Alcohol use: No    Drug use: No    Sexual activity: Yes     Partners: Male     Comment: last pap smear negative with HPV negative on 8/2/21     GENETIC SCREENING:  Age >34 yo as of MICHELLE: no  Thalassemia: no  NTD: no  CHD: no  Down Syndrome/MR/Fragile X/Autism: no  Ashkenazi Mormon with Greg-Sachs, [place], familial dysautonomia: no  Sickle cell disease or trait: no  Hemophilia: no  Muscular dystrophy: no  Cystic fibrosis: no  Selena's chorea: no  Birth defects: no  Genetic/chromosomal disorders: no    INFECTION HISTORY:  TB exposure: no  HSV: no  Illness since LMP: no  Prior GBS infected child: no  STIs: no    ALLERGIES:  Allergies   Allergen Reactions    Amoxicillin Rash    Penicillins Rash       MEDICATIONS:  Prior to Admission medications    Medication Sig Start Date End Date Taking? Authorizing Provider   MAGNESIUM PO Take  by mouth.   Yes Alina Vivas MD   prenatal vitamin (prenatal, CLASSIC, vitamin) tablet Take  by mouth Daily.   Yes Alina Vivas MD   sertraline (ZOLOFT) 50 MG tablet Take 1 tablet by mouth Daily. 7/12/23  Yes Alina Vivas MD   omeprazole (priLOSEC) 40 MG capsule TAKE 1 CAPSULE BY MOUTH DAILY 7/13/23 8/28/23  Yo Pinto MD       PHYSICAL EXAM:   /70   Wt 83.5 kg (184 lb)    LMP 2023 (Exact Date)   BMI 26.40 kg/mý   General: Alert, healthy, no distress, well nourished and well developed.  Neurologic: Alert, oriented to person, place, and time.  Gait normal.  Cranial nerves II-XII grossly intact.  HEENT: Normocephalic, atraumatic.  Extraocular muscles intact, pupils equal and reactive x2.    Teeth: Normal hygiene.  Neck: Supple, no adenopathy, thyroid normal size, non-tender, without nodularity, trachea midline.  Lungs: Normal respiratory effort.  Clear to auscultation bilaterally.  No wheezes, rhonci, or rales.  Heart: Regular rate and rhythm.  No murmer, rub or gallop.  Abdomen: Soft, non-tender, non-distended,no masses, no hepatosplenomegaly, no hernia.  Skin: No rash, no lesions.  Extremities: No cyanosis, clubbing or edema.    Prelim US: CRL: 1.72cm, YS and GS seen. CL: 4.25cm. Rt ovary seen, Lt ovary not seen.     IMPRESSION:  Angélica Hooper is a 33 y.o.  at 8w0d for a new prenatal visit.    PLAN:  1.  IUP at 8w0d  - Prenatal labs reviewed   - Genetic testing, including cystic fibrosis, was discussed and patient declined all testing.   - Continue prenatal vitamins  - Weight gain counseling performed.   - Pregravid BMI 25-29.9: Recommend 15-25 lb  - Return to clinic in 4 weeks for return prenatal visit     Diagnosis Plan   1. 8 weeks gestation of pregnancy        2. Multigravida        3. Nausea and vomiting during pregnancy prior to 22 weeks gestation  ondansetron ODT (ZOFRAN-ODT) 8 MG disintegrating tablet        Shaneka Russ, APRN  2023  11:16 CDT

## 2023-09-05 ENCOUNTER — PATIENT ROUNDING (BHMG ONLY) (OUTPATIENT)
Dept: OBSTETRICS AND GYNECOLOGY | Facility: CLINIC | Age: 33
End: 2023-09-05
Payer: COMMERCIAL

## 2023-09-05 NOTE — PROGRESS NOTES
September 5, 2023    Hello, may I speak with Angélica Hooper?    My name is Kalee Zepeda CSA      [facility] [address]  [facility] [address] [phone].    Before we get started may I verify your date of birth? 1990    I am calling to officially welcome you to our practice and ask about your recent visit. Is this a good time to talk? No, Patient was at work.     Tell me about your visit with us. What things went well?         We're always looking for ways to make our patients' experiences even better. Do you have recommendations on ways we may improve?      Overall were you satisfied with your first visit to our practice?        I appreciate you taking the time to speak with me today. Is there anything else I can do for you?       Thank you, and have a great day.

## 2023-09-25 ENCOUNTER — ROUTINE PRENATAL (OUTPATIENT)
Dept: OBSTETRICS AND GYNECOLOGY | Facility: CLINIC | Age: 33
End: 2023-09-25

## 2023-09-25 VITALS — WEIGHT: 188 LBS | BODY MASS INDEX: 26.98 KG/M2 | DIASTOLIC BLOOD PRESSURE: 70 MMHG | SYSTOLIC BLOOD PRESSURE: 122 MMHG

## 2023-09-25 DIAGNOSIS — Z64.1 MULTIGRAVIDA: ICD-10-CM

## 2023-09-25 DIAGNOSIS — Z3A.12 12 WEEKS GESTATION OF PREGNANCY: Primary | ICD-10-CM

## 2023-09-25 DIAGNOSIS — O21.9 NAUSEA AND VOMITING DURING PREGNANCY PRIOR TO 22 WEEKS GESTATION: ICD-10-CM

## 2023-09-25 PROCEDURE — 0502F SUBSEQUENT PRENATAL CARE: CPT

## 2023-09-25 NOTE — PROGRESS NOTES
CC: Prenatal visit    Angélica Hooper is a 33 y.o.  at 12w0d.  Doing well.  Denies contractions, LOF, or VB.    Nausea is improved.   Feeling tired today.   [place]     /70   Wt 85.3 kg (188 lb)   LMP 2023 (Exact Date)   BMI 26.98 kg/m²   SVE: na     Fetal Heart Rate: +vscan     Problems (from 23 to present)       Problem Noted Resolved    Nausea and vomiting during pregnancy prior to 22 weeks gestation 2023 by Shaneka Russ APRN No    Overview Signed 2023 11:08 AM by Shaneka Russ APRN     Start Zofran          Multigravida 2023 by Shaneka Russ APRN No    Overview Signed 2023 11:09 AM by Shaneka Russ APRN     3 Vaginal deliveries                  A/P: Angélica Hooper is a 33 y.o.  at 12w0d.  - RTC in 4 weeks     Diagnosis Plan   1. 12 weeks gestation of pregnancy        2. Nausea and vomiting during pregnancy prior to 22 weeks gestation        3. Multigravida          NEO Romero  2023  15:37 CDT

## (undated) DEVICE — PK LAP CHOLE LF 60

## (undated) DEVICE — UNDYED BRAIDED (POLYGLACTIN 910), SYNTHETIC ABSORBABLE SUTURE: Brand: COATED VICRYL

## (undated) DEVICE — SYS CLS PORTSITE CT CLOSESURE 5AND10/12

## (undated) DEVICE — ENDOPATH XCEL BLADELESS TROCARS WITH STABILITY SLEEVES: Brand: ENDOPATH XCEL

## (undated) DEVICE — ENDOPOUCH RETRIEVER SPECIMEN RETRIEVAL BAGS: Brand: ENDOPOUCH RETRIEVER

## (undated) DEVICE — PAD GRND REM POLYHESIVE A/ DISP

## (undated) DEVICE — STERILE POLYISOPRENE POWDER-FREE SURGICAL GLOVES WITH EMOLLIENT COATING: Brand: PROTEXIS

## (undated) DEVICE — GLV SURG TRIUMPH PF LTX 6.5 STRL

## (undated) DEVICE — SINGLE-USE BIOPSY FORCEPS: Brand: RADIAL JAW 4

## (undated) DEVICE — GLV SURG SIGNATURE ESSENTIAL PF LTX SZ6.5

## (undated) DEVICE — SYR LUERLOK 20CC BX/50

## (undated) DEVICE — SOL IRRIG NACL 1000ML

## (undated) DEVICE — LAPAROVUE VISIBILITY SYSTEM LAPAROSCOPIC SOLUTIONS: Brand: LAPAROVUE

## (undated) DEVICE — CORE TRUMPET FOR SINGLE SOLUTION BAG: Brand: CORE DYNAMICS

## (undated) DEVICE — APPL CHLORAPREP HI/LITE 26ML ORNG

## (undated) DEVICE — BITEBLOCK ENDO W/STRAP 60F A/ LF DISP

## (undated) DEVICE — TBG PENCL TELESCP MEGADYNE SMOKE EVAC 10FT

## (undated) DEVICE — ANTIBACTERIAL UNDYED BRAIDED (POLYGLACTIN 910), SYNTHETIC ABSORBABLE SUTURE: Brand: COATED VICRYL

## (undated) DEVICE — THE KUMAR CATHETER®, USED IN CONJUNCTION WITH KUMAR CHOLANGIOGRAPHY® CLAMP, IS MEANT TO PROVIDE A MEANS OF LAPAROSCOPIC CHOLANGIOGRAPHY. IT COMPRISES A TRANSLUCENT TUBING ( 76 CM. LENGTH AND 16 GA. ) THAT CARRIES A 19 GA., 1.25 CM LONG NEEDLE AT THE END. THE KUMAR CATHETER® IS USED TO PUNCTURE THE HARTMANN'S POUCH OF THE GALLBLADDER FOR BILIARY ACCESS AND / OR ASPIRATION. PRODUCT IS LATEX FREE.: Brand: KUMAR CATHETER®

## (undated) DEVICE — STERILE POLYISOPRENE POWDER-FREE SURGICAL GLOVES: Brand: PROTEXIS

## (undated) DEVICE — MONOPOLAR METZENBAUM SCISSOR TIP, DISPOSABLE: Brand: MONOPOLAR METZENBAUM SCISSOR TIP, DISPOSABLE

## (undated) DEVICE — DECANTER BAG 9": Brand: MEDLINE INDUSTRIES, INC.

## (undated) DEVICE — CVR SURG EQUIP BND RECTG 36X28

## (undated) DEVICE — ADHS SKIN PREMIERPRO EXOFIN TOPICAL HI/VISC .5ML